# Patient Record
Sex: FEMALE | Race: WHITE | NOT HISPANIC OR LATINO | Employment: OTHER | URBAN - METROPOLITAN AREA
[De-identification: names, ages, dates, MRNs, and addresses within clinical notes are randomized per-mention and may not be internally consistent; named-entity substitution may affect disease eponyms.]

---

## 2017-01-04 ENCOUNTER — COMMUNICATION - HEALTHEAST (OUTPATIENT)
Dept: INTERNAL MEDICINE | Facility: CLINIC | Age: 62
End: 2017-01-04

## 2017-02-01 ENCOUNTER — OFFICE VISIT - HEALTHEAST (OUTPATIENT)
Dept: INTERNAL MEDICINE | Facility: CLINIC | Age: 62
End: 2017-02-01

## 2017-02-01 DIAGNOSIS — E66.9 OBESITY: ICD-10-CM

## 2017-02-01 DIAGNOSIS — R10.11 RIGHT UPPER QUADRANT ABDOMINAL PAIN: ICD-10-CM

## 2017-02-01 ASSESSMENT — MIFFLIN-ST. JEOR: SCORE: 1979.13

## 2017-02-03 ENCOUNTER — HOSPITAL ENCOUNTER (OUTPATIENT)
Dept: ULTRASOUND IMAGING | Facility: HOSPITAL | Age: 62
Discharge: HOME | End: 2017-02-03
Attending: INTERNAL MEDICINE

## 2017-02-03 DIAGNOSIS — R10.11 RIGHT UPPER QUADRANT ABDOMINAL PAIN: ICD-10-CM

## 2017-02-09 ENCOUNTER — COMMUNICATION - HEALTHEAST (OUTPATIENT)
Dept: INTERNAL MEDICINE | Facility: CLINIC | Age: 62
End: 2017-02-09

## 2017-02-12 ENCOUNTER — RECORDS - HEALTHEAST (OUTPATIENT)
Dept: ADMINISTRATIVE | Facility: OTHER | Age: 62
End: 2017-02-12

## 2017-04-10 ENCOUNTER — RECORDS - HEALTHEAST (OUTPATIENT)
Dept: ADMINISTRATIVE | Facility: OTHER | Age: 62
End: 2017-04-10

## 2017-05-01 ENCOUNTER — RECORDS - HEALTHEAST (OUTPATIENT)
Dept: ADMINISTRATIVE | Facility: OTHER | Age: 62
End: 2017-05-01

## 2017-05-17 ENCOUNTER — OFFICE VISIT - HEALTHEAST (OUTPATIENT)
Dept: INTERNAL MEDICINE | Facility: CLINIC | Age: 62
End: 2017-05-17

## 2017-05-17 DIAGNOSIS — E66.9 OBESITY: ICD-10-CM

## 2017-05-17 DIAGNOSIS — K76.0 NONALCOHOLIC FATTY LIVER DISEASE: ICD-10-CM

## 2017-05-17 LAB
CHOLEST SERPL-MCNC: 165 MG/DL
FASTING STATUS PATIENT QL REPORTED: YES
HDLC SERPL-MCNC: 53 MG/DL
LDLC SERPL CALC-MCNC: 86 MG/DL
TRIGL SERPL-MCNC: 128 MG/DL

## 2017-05-17 ASSESSMENT — MIFFLIN-ST. JEOR: SCORE: 1974.6

## 2017-05-18 ENCOUNTER — COMMUNICATION - HEALTHEAST (OUTPATIENT)
Dept: INTERNAL MEDICINE | Facility: CLINIC | Age: 62
End: 2017-05-18

## 2017-05-21 ENCOUNTER — RECORDS - HEALTHEAST (OUTPATIENT)
Dept: ADMINISTRATIVE | Facility: OTHER | Age: 62
End: 2017-05-21

## 2017-06-16 ENCOUNTER — RECORDS - HEALTHEAST (OUTPATIENT)
Dept: ADMINISTRATIVE | Facility: OTHER | Age: 62
End: 2017-06-16

## 2017-06-26 ENCOUNTER — RECORDS - HEALTHEAST (OUTPATIENT)
Dept: ADMINISTRATIVE | Facility: OTHER | Age: 62
End: 2017-06-26

## 2017-07-12 ENCOUNTER — RECORDS - HEALTHEAST (OUTPATIENT)
Dept: ADMINISTRATIVE | Facility: OTHER | Age: 62
End: 2017-07-12

## 2017-07-12 ENCOUNTER — COMMUNICATION - HEALTHEAST (OUTPATIENT)
Dept: SCHEDULING | Facility: CLINIC | Age: 62
End: 2017-07-12

## 2017-07-26 ENCOUNTER — OFFICE VISIT - HEALTHEAST (OUTPATIENT)
Dept: INTERNAL MEDICINE | Facility: CLINIC | Age: 62
End: 2017-07-26

## 2017-07-26 DIAGNOSIS — K76.0 NONALCOHOLIC FATTY LIVER DISEASE: ICD-10-CM

## 2017-07-26 DIAGNOSIS — E66.9 OBESITY: ICD-10-CM

## 2017-07-26 ASSESSMENT — MIFFLIN-ST. JEOR: SCORE: 1988.2

## 2017-10-06 ENCOUNTER — COMMUNICATION - HEALTHEAST (OUTPATIENT)
Dept: SCHEDULING | Facility: CLINIC | Age: 62
End: 2017-10-06

## 2017-10-09 ENCOUNTER — OFFICE VISIT - HEALTHEAST (OUTPATIENT)
Dept: INTERNAL MEDICINE | Facility: CLINIC | Age: 62
End: 2017-10-09

## 2017-10-09 DIAGNOSIS — K76.0 NONALCOHOLIC FATTY LIVER DISEASE: ICD-10-CM

## 2017-10-09 DIAGNOSIS — E11.9 TYPE 2 DIABETES MELLITUS (H): ICD-10-CM

## 2017-10-09 DIAGNOSIS — Z23 NEED FOR VACCINATION: ICD-10-CM

## 2017-10-09 DIAGNOSIS — R20.2 PARESTHESIAS: ICD-10-CM

## 2017-10-09 LAB — HBA1C MFR BLD: 5.9 % (ref 3.5–6)

## 2017-10-09 ASSESSMENT — MIFFLIN-ST. JEOR: SCORE: 1983.67

## 2017-10-11 ENCOUNTER — COMMUNICATION - HEALTHEAST (OUTPATIENT)
Dept: INTERNAL MEDICINE | Facility: CLINIC | Age: 62
End: 2017-10-11

## 2017-11-27 ENCOUNTER — RECORDS - HEALTHEAST (OUTPATIENT)
Dept: ADMINISTRATIVE | Facility: OTHER | Age: 62
End: 2017-11-27

## 2017-11-29 ENCOUNTER — RECORDS - HEALTHEAST (OUTPATIENT)
Dept: ADMINISTRATIVE | Facility: OTHER | Age: 62
End: 2017-11-29

## 2017-12-05 ENCOUNTER — OFFICE VISIT - HEALTHEAST (OUTPATIENT)
Dept: INTERNAL MEDICINE | Facility: CLINIC | Age: 62
End: 2017-12-05

## 2017-12-05 DIAGNOSIS — E11.9 TYPE 2 DIABETES MELLITUS (H): ICD-10-CM

## 2017-12-05 DIAGNOSIS — M54.16 LUMBAR RADICULOPATHY: ICD-10-CM

## 2017-12-05 DIAGNOSIS — I80.9 SUPERFICIAL PHLEBITIS: ICD-10-CM

## 2017-12-05 ASSESSMENT — MIFFLIN-ST. JEOR: SCORE: 1970.06

## 2017-12-09 ENCOUNTER — RECORDS - HEALTHEAST (OUTPATIENT)
Dept: ADMINISTRATIVE | Facility: OTHER | Age: 62
End: 2017-12-09

## 2017-12-11 ENCOUNTER — COMMUNICATION - HEALTHEAST (OUTPATIENT)
Dept: SCHEDULING | Facility: CLINIC | Age: 62
End: 2017-12-11

## 2017-12-13 ENCOUNTER — OFFICE VISIT - HEALTHEAST (OUTPATIENT)
Dept: INTERNAL MEDICINE | Facility: CLINIC | Age: 62
End: 2017-12-13

## 2017-12-13 ENCOUNTER — HOSPITAL ENCOUNTER (OUTPATIENT)
Dept: ULTRASOUND IMAGING | Facility: HOSPITAL | Age: 62
Discharge: HOME | End: 2017-12-13
Attending: INTERNAL MEDICINE

## 2017-12-13 DIAGNOSIS — I82.409 DVT (DEEP VENOUS THROMBOSIS) (H): ICD-10-CM

## 2017-12-13 DIAGNOSIS — R20.0 NUMBNESS IN RIGHT LEG: ICD-10-CM

## 2017-12-13 DIAGNOSIS — M79.604 PAIN OF RIGHT LOWER EXTREMITY: ICD-10-CM

## 2017-12-13 ASSESSMENT — MIFFLIN-ST. JEOR: SCORE: 1965.52

## 2018-02-01 ENCOUNTER — RECORDS - HEALTHEAST (OUTPATIENT)
Dept: ADMINISTRATIVE | Facility: OTHER | Age: 63
End: 2018-02-01

## 2018-02-03 ENCOUNTER — RECORDS - HEALTHEAST (OUTPATIENT)
Dept: LAB | Facility: HOSPITAL | Age: 63
End: 2018-02-03

## 2018-02-03 LAB
ALBUMIN SERPL-MCNC: 3.6 G/DL (ref 3.5–5)
ALP SERPL-CCNC: 112 U/L (ref 45–120)
ALT SERPL W P-5'-P-CCNC: 25 U/L (ref 0–45)
ANION GAP SERPL CALCULATED.3IONS-SCNC: 12 MMOL/L (ref 5–18)
AST SERPL W P-5'-P-CCNC: 22 U/L (ref 0–40)
BILIRUB SERPL-MCNC: 0.6 MG/DL (ref 0–1)
BUN SERPL-MCNC: 12 MG/DL (ref 8–22)
CALCIUM SERPL-MCNC: 9.4 MG/DL (ref 8.5–10.5)
CHLORIDE BLD-SCNC: 105 MMOL/L (ref 98–107)
CO2 SERPL-SCNC: 25 MMOL/L (ref 22–31)
CREAT SERPL-MCNC: 0.78 MG/DL (ref 0.6–1.1)
GFR SERPL CREATININE-BSD FRML MDRD: >60 ML/MIN/1.73M2
GLUCOSE BLD-MCNC: 120 MG/DL (ref 70–125)
POTASSIUM BLD-SCNC: 4.2 MMOL/L (ref 3.5–5)
PROT SERPL-MCNC: 7.8 G/DL (ref 6–8)
SODIUM SERPL-SCNC: 142 MMOL/L (ref 136–145)
TSH SERPL DL<=0.005 MIU/L-ACNC: 2.79 UIU/ML (ref 0.3–5)
VIT B12 SERPL-MCNC: 592 PG/ML (ref 213–816)

## 2018-02-08 ENCOUNTER — HOSPITAL ENCOUNTER (OUTPATIENT)
Dept: MRI IMAGING | Facility: HOSPITAL | Age: 63
Discharge: HOME | End: 2018-02-08
Attending: PSYCHIATRY & NEUROLOGY

## 2018-02-08 ENCOUNTER — RECORDS - HEALTHEAST (OUTPATIENT)
Dept: ADMINISTRATIVE | Facility: OTHER | Age: 63
End: 2018-02-08

## 2018-02-08 DIAGNOSIS — R47.89 WORD FINDING DIFFICULTY: ICD-10-CM

## 2018-02-08 DIAGNOSIS — R20.0 NUMBNESS OF LEGS: ICD-10-CM

## 2018-02-26 ENCOUNTER — RECORDS - HEALTHEAST (OUTPATIENT)
Dept: ADMINISTRATIVE | Facility: OTHER | Age: 63
End: 2018-02-26

## 2018-05-07 ENCOUNTER — OFFICE VISIT - HEALTHEAST (OUTPATIENT)
Dept: INTERNAL MEDICINE | Facility: CLINIC | Age: 63
End: 2018-05-07

## 2018-05-07 DIAGNOSIS — E11.9 TYPE 2 DIABETES MELLITUS (H): ICD-10-CM

## 2018-05-07 DIAGNOSIS — K57.92 DIVERTICULITIS: ICD-10-CM

## 2018-05-07 LAB — HBA1C MFR BLD: 6.1 % (ref 3.5–6)

## 2018-05-07 ASSESSMENT — MIFFLIN-ST. JEOR: SCORE: 1965.52

## 2018-05-08 ENCOUNTER — COMMUNICATION - HEALTHEAST (OUTPATIENT)
Dept: INTERNAL MEDICINE | Facility: CLINIC | Age: 63
End: 2018-05-08

## 2018-06-11 ENCOUNTER — RECORDS - HEALTHEAST (OUTPATIENT)
Dept: ADMINISTRATIVE | Facility: OTHER | Age: 63
End: 2018-06-11

## 2018-06-18 ENCOUNTER — HOSPITAL ENCOUNTER (OUTPATIENT)
Dept: MAMMOGRAPHY | Facility: CLINIC | Age: 63
Discharge: HOME | End: 2018-06-18
Attending: OBSTETRICS & GYNECOLOGY

## 2018-06-18 DIAGNOSIS — N64.9 BREAST LESION: ICD-10-CM

## 2018-06-18 DIAGNOSIS — N64.89 OTHER SPECIFIED DISORDERS OF BREAST (CODE): ICD-10-CM

## 2018-08-28 ENCOUNTER — OFFICE VISIT - HEALTHEAST (OUTPATIENT)
Dept: INTERNAL MEDICINE | Facility: CLINIC | Age: 63
End: 2018-08-28

## 2018-08-28 DIAGNOSIS — R10.32 LLQ ABDOMINAL PAIN: ICD-10-CM

## 2018-08-28 DIAGNOSIS — E11.9 TYPE 2 DIABETES MELLITUS (H): ICD-10-CM

## 2018-08-28 ASSESSMENT — MIFFLIN-ST. JEOR: SCORE: 1970.06

## 2018-08-30 ENCOUNTER — RECORDS - HEALTHEAST (OUTPATIENT)
Dept: ADMINISTRATIVE | Facility: OTHER | Age: 63
End: 2018-08-30

## 2018-09-17 ENCOUNTER — COMMUNICATION - HEALTHEAST (OUTPATIENT)
Dept: INTERNAL MEDICINE | Facility: CLINIC | Age: 63
End: 2018-09-17

## 2018-11-08 ENCOUNTER — RECORDS - HEALTHEAST (OUTPATIENT)
Dept: ADMINISTRATIVE | Facility: OTHER | Age: 63
End: 2018-11-08

## 2019-03-05 ENCOUNTER — RECORDS - HEALTHEAST (OUTPATIENT)
Dept: ADMINISTRATIVE | Facility: OTHER | Age: 64
End: 2019-03-05

## 2019-03-12 ENCOUNTER — OFFICE VISIT - HEALTHEAST (OUTPATIENT)
Dept: INTERNAL MEDICINE | Facility: CLINIC | Age: 64
End: 2019-03-12

## 2019-03-12 DIAGNOSIS — E66.01 MORBID OBESITY (H): ICD-10-CM

## 2019-03-12 DIAGNOSIS — F32.5 MAJOR DEPRESSION IN COMPLETE REMISSION (H): ICD-10-CM

## 2019-03-12 DIAGNOSIS — M79.662 PAIN OF LEFT LOWER LEG: ICD-10-CM

## 2019-03-12 DIAGNOSIS — Z23 NEED FOR VACCINATION: ICD-10-CM

## 2019-03-12 DIAGNOSIS — E11.9 TYPE 2 DIABETES MELLITUS WITHOUT COMPLICATION, WITHOUT LONG-TERM CURRENT USE OF INSULIN (H): ICD-10-CM

## 2019-03-12 DIAGNOSIS — I82.4Z9 DEEP VEIN THROMBOSIS (DVT) OF DISTAL VEIN OF LOWER EXTREMITY, UNSPECIFIED CHRONICITY, UNSPECIFIED LATERALITY (H): ICD-10-CM

## 2019-03-12 DIAGNOSIS — I48.0 PAROXYSMAL ATRIAL FIBRILLATION (H): ICD-10-CM

## 2019-03-12 LAB
C REACTIVE PROTEIN LHE: 2.7 MG/DL (ref 0–0.8)
HBA1C MFR BLD: 5.8 % (ref 3.5–6)

## 2019-03-12 ASSESSMENT — MIFFLIN-ST. JEOR: SCORE: 1970.06

## 2019-03-14 ENCOUNTER — COMMUNICATION - HEALTHEAST (OUTPATIENT)
Dept: INTERNAL MEDICINE | Facility: CLINIC | Age: 64
End: 2019-03-14

## 2019-03-15 ENCOUNTER — COMMUNICATION - HEALTHEAST (OUTPATIENT)
Dept: SCHEDULING | Facility: CLINIC | Age: 64
End: 2019-03-15

## 2019-03-19 ENCOUNTER — OFFICE VISIT - HEALTHEAST (OUTPATIENT)
Dept: INTERNAL MEDICINE | Facility: CLINIC | Age: 64
End: 2019-03-19

## 2019-03-19 DIAGNOSIS — G62.9 PERIPHERAL POLYNEUROPATHY: ICD-10-CM

## 2019-03-19 ASSESSMENT — MIFFLIN-ST. JEOR: SCORE: 1970.06

## 2019-05-13 ENCOUNTER — OFFICE VISIT - HEALTHEAST (OUTPATIENT)
Dept: INTERNAL MEDICINE | Facility: CLINIC | Age: 64
End: 2019-05-13

## 2019-05-13 DIAGNOSIS — E66.01 MORBID OBESITY (H): ICD-10-CM

## 2019-05-13 DIAGNOSIS — F32.5 MAJOR DEPRESSION IN COMPLETE REMISSION (H): ICD-10-CM

## 2019-05-13 DIAGNOSIS — K21.00 GERD WITH ESOPHAGITIS: ICD-10-CM

## 2019-05-13 DIAGNOSIS — Z12.11 SCREENING FOR MALIGNANT NEOPLASM OF COLON: ICD-10-CM

## 2019-05-13 ASSESSMENT — MIFFLIN-ST. JEOR: SCORE: 1979.13

## 2019-05-21 ENCOUNTER — COMMUNICATION - HEALTHEAST (OUTPATIENT)
Dept: SCHEDULING | Facility: CLINIC | Age: 64
End: 2019-05-21

## 2019-07-12 ENCOUNTER — RECORDS - HEALTHEAST (OUTPATIENT)
Dept: ADMINISTRATIVE | Facility: OTHER | Age: 64
End: 2019-07-12

## 2019-08-05 ENCOUNTER — RECORDS - HEALTHEAST (OUTPATIENT)
Dept: ADMINISTRATIVE | Facility: OTHER | Age: 64
End: 2019-08-05

## 2019-08-14 ENCOUNTER — RECORDS - HEALTHEAST (OUTPATIENT)
Dept: HEALTH INFORMATION MANAGEMENT | Facility: CLINIC | Age: 64
End: 2019-08-14

## 2019-09-03 ENCOUNTER — OFFICE VISIT - HEALTHEAST (OUTPATIENT)
Dept: INTERNAL MEDICINE | Facility: CLINIC | Age: 64
End: 2019-09-03

## 2019-09-03 DIAGNOSIS — K11.20 SALIVARY GLAND INFECTION: ICD-10-CM

## 2019-09-03 LAB
BASOPHILS # BLD AUTO: 0 THOU/UL (ref 0–0.2)
BASOPHILS NFR BLD AUTO: 0 % (ref 0–2)
C REACTIVE PROTEIN LHE: 2.6 MG/DL (ref 0–0.8)
EOSINOPHIL # BLD AUTO: 0.1 THOU/UL (ref 0–0.4)
EOSINOPHIL NFR BLD AUTO: 1 % (ref 0–6)
ERYTHROCYTE [DISTWIDTH] IN BLOOD BY AUTOMATED COUNT: 15.8 % (ref 11–14.5)
HCT VFR BLD AUTO: 41.2 % (ref 35–47)
HGB BLD-MCNC: 12.7 G/DL (ref 12–16)
LYMPHOCYTES # BLD AUTO: 2.7 THOU/UL (ref 0.8–4.4)
LYMPHOCYTES NFR BLD AUTO: 27 % (ref 20–40)
MCH RBC QN AUTO: 24.4 PG (ref 27–34)
MCHC RBC AUTO-ENTMCNC: 30.8 G/DL (ref 32–36)
MCV RBC AUTO: 79 FL (ref 80–100)
MONOCYTES # BLD AUTO: 0.9 THOU/UL (ref 0–0.9)
MONOCYTES NFR BLD AUTO: 9 % (ref 2–10)
NEUTROPHILS # BLD AUTO: 6.3 THOU/UL (ref 2–7.7)
NEUTROPHILS NFR BLD AUTO: 63 % (ref 50–70)
PLATELET # BLD AUTO: 256 THOU/UL (ref 140–440)
PMV BLD AUTO: 11.1 FL (ref 8.5–12.5)
RBC # BLD AUTO: 5.21 MILL/UL (ref 3.8–5.4)
WBC: 10 THOU/UL (ref 4–11)

## 2019-09-03 ASSESSMENT — PATIENT HEALTH QUESTIONNAIRE - PHQ9: SUM OF ALL RESPONSES TO PHQ QUESTIONS 1-9: 10

## 2019-09-03 ASSESSMENT — MIFFLIN-ST. JEOR: SCORE: 1979.13

## 2019-09-04 ENCOUNTER — COMMUNICATION - HEALTHEAST (OUTPATIENT)
Dept: INTERNAL MEDICINE | Facility: CLINIC | Age: 64
End: 2019-09-04

## 2019-09-04 ENCOUNTER — COMMUNICATION - HEALTHEAST (OUTPATIENT)
Dept: SCHEDULING | Facility: CLINIC | Age: 64
End: 2019-09-04

## 2019-09-11 ENCOUNTER — COMMUNICATION - HEALTHEAST (OUTPATIENT)
Dept: INTERNAL MEDICINE | Facility: CLINIC | Age: 64
End: 2019-09-11

## 2019-09-17 ENCOUNTER — COMMUNICATION - HEALTHEAST (OUTPATIENT)
Dept: SCHEDULING | Facility: CLINIC | Age: 64
End: 2019-09-17

## 2019-09-19 ENCOUNTER — RECORDS - HEALTHEAST (OUTPATIENT)
Dept: ADMINISTRATIVE | Facility: OTHER | Age: 64
End: 2019-09-19

## 2019-09-23 ENCOUNTER — RECORDS - HEALTHEAST (OUTPATIENT)
Dept: ADMINISTRATIVE | Facility: OTHER | Age: 64
End: 2019-09-23

## 2019-10-21 ENCOUNTER — RECORDS - HEALTHEAST (OUTPATIENT)
Dept: ADMINISTRATIVE | Facility: OTHER | Age: 64
End: 2019-10-21

## 2019-11-20 ENCOUNTER — COMMUNICATION - HEALTHEAST (OUTPATIENT)
Dept: INTERNAL MEDICINE | Facility: CLINIC | Age: 64
End: 2019-11-20

## 2019-11-20 DIAGNOSIS — K21.00 GERD WITH ESOPHAGITIS: ICD-10-CM

## 2019-12-02 ENCOUNTER — RECORDS - HEALTHEAST (OUTPATIENT)
Dept: ADMINISTRATIVE | Facility: OTHER | Age: 64
End: 2019-12-02

## 2019-12-02 ENCOUNTER — AMBULATORY - HEALTHEAST (OUTPATIENT)
Dept: NEUROLOGY | Facility: CLINIC | Age: 64
End: 2019-12-02

## 2019-12-02 DIAGNOSIS — R47.89 WORD FINDING DIFFICULTY: ICD-10-CM

## 2019-12-19 ENCOUNTER — HOSPITAL ENCOUNTER (OUTPATIENT)
Dept: NEUROLOGY | Facility: CLINIC | Age: 64
Setting detail: THERAPIES SERIES
Discharge: STILL ADMITTED OR ADMINISTRATIVE | End: 2019-12-19
Attending: PSYCHIATRY & NEUROLOGY

## 2019-12-19 DIAGNOSIS — R47.89 WORD FINDING DIFFICULTY: ICD-10-CM

## 2020-01-03 ENCOUNTER — HOSPITAL ENCOUNTER (OUTPATIENT)
Dept: NEUROLOGY | Facility: CLINIC | Age: 65
Setting detail: THERAPIES SERIES
Discharge: STILL ADMITTED OR ADMINISTRATIVE | End: 2020-01-03
Attending: PSYCHIATRY & NEUROLOGY

## 2020-01-03 DIAGNOSIS — F31.9 BIPOLAR I DISORDER (H): ICD-10-CM

## 2020-01-03 DIAGNOSIS — F06.70 MILD NEUROCOGNITIVE DISORDER DUE TO MULTIPLE ETIOLOGIES: ICD-10-CM

## 2020-01-03 DIAGNOSIS — F32.5 MAJOR DEPRESSION IN COMPLETE REMISSION (H): ICD-10-CM

## 2020-01-03 DIAGNOSIS — E66.01 MORBID OBESITY (H): ICD-10-CM

## 2020-05-12 ENCOUNTER — AMBULATORY - HEALTHEAST (OUTPATIENT)
Dept: LAB | Facility: CLINIC | Age: 65
End: 2020-05-12

## 2020-05-12 ENCOUNTER — OFFICE VISIT - HEALTHEAST (OUTPATIENT)
Dept: INTERNAL MEDICINE | Facility: CLINIC | Age: 65
End: 2020-05-12

## 2020-05-12 DIAGNOSIS — R73.03 PREDIABETES: ICD-10-CM

## 2020-05-12 DIAGNOSIS — F32.5 MAJOR DEPRESSION IN COMPLETE REMISSION (H): ICD-10-CM

## 2020-05-12 DIAGNOSIS — E66.01 MORBID OBESITY (H): ICD-10-CM

## 2020-05-12 LAB
ANION GAP SERPL CALCULATED.3IONS-SCNC: 11 MMOL/L (ref 5–18)
BUN SERPL-MCNC: 10 MG/DL (ref 8–22)
CALCIUM SERPL-MCNC: 9.5 MG/DL (ref 8.5–10.5)
CHLORIDE BLD-SCNC: 100 MMOL/L (ref 98–107)
CO2 SERPL-SCNC: 28 MMOL/L (ref 22–31)
CREAT SERPL-MCNC: 0.76 MG/DL (ref 0.6–1.1)
GFR SERPL CREATININE-BSD FRML MDRD: >60 ML/MIN/1.73M2
GLUCOSE BLD-MCNC: 91 MG/DL (ref 70–125)
POTASSIUM BLD-SCNC: 4.6 MMOL/L (ref 3.5–5)
SODIUM SERPL-SCNC: 139 MMOL/L (ref 136–145)

## 2020-05-12 ASSESSMENT — PATIENT HEALTH QUESTIONNAIRE - PHQ9: SUM OF ALL RESPONSES TO PHQ QUESTIONS 1-9: 14

## 2020-05-12 ASSESSMENT — MIFFLIN-ST. JEOR: SCORE: 2010.88

## 2020-05-13 ENCOUNTER — COMMUNICATION - HEALTHEAST (OUTPATIENT)
Dept: INTERNAL MEDICINE | Facility: CLINIC | Age: 65
End: 2020-05-13

## 2020-05-13 LAB — HBA1C MFR BLD: 5.9 %

## 2020-06-10 ENCOUNTER — RECORDS - HEALTHEAST (OUTPATIENT)
Dept: ADMINISTRATIVE | Facility: OTHER | Age: 65
End: 2020-06-10

## 2020-06-17 ENCOUNTER — RECORDS - HEALTHEAST (OUTPATIENT)
Dept: ADMINISTRATIVE | Facility: OTHER | Age: 65
End: 2020-06-17

## 2020-06-17 LAB — RETINOPATHY: NEGATIVE

## 2020-06-23 ENCOUNTER — RECORDS - HEALTHEAST (OUTPATIENT)
Dept: HEALTH INFORMATION MANAGEMENT | Facility: CLINIC | Age: 65
End: 2020-06-23

## 2020-07-03 PROBLEM — R20.0 NUMBNESS OF LOWER EXTREMITY: Status: ACTIVE | Noted: 2020-07-03

## 2020-07-03 PROBLEM — R00.2 PALPITATIONS: Status: ACTIVE | Noted: 2017-06-07

## 2020-07-03 PROBLEM — R73.03 PREDIABETES: Status: ACTIVE | Noted: 2020-05-12

## 2020-07-03 PROBLEM — R47.89 WORD FINDING DIFFICULTY: Status: ACTIVE | Noted: 2020-07-03

## 2020-07-03 PROBLEM — I82.4Z9 DEEP VEIN THROMBOSIS (DVT) OF DISTAL VEIN OF LOWER EXTREMITY, UNSPECIFIED CHRONICITY, UNSPECIFIED LATERALITY (H): Status: ACTIVE | Noted: 2019-03-12

## 2020-07-03 PROBLEM — F32.5 MAJOR DEPRESSION IN COMPLETE REMISSION (H): Status: ACTIVE | Noted: 2018-08-28

## 2020-07-03 PROBLEM — E66.01 MORBID OBESITY (H): Status: ACTIVE | Noted: 2018-08-28

## 2020-07-07 ENCOUNTER — OFFICE VISIT (OUTPATIENT)
Dept: NEUROLOGY | Facility: CLINIC | Age: 65
End: 2020-07-07
Payer: COMMERCIAL

## 2020-07-07 ENCOUNTER — RECORDS - HEALTHEAST (OUTPATIENT)
Dept: ADMINISTRATIVE | Facility: OTHER | Age: 65
End: 2020-07-07

## 2020-07-07 VITALS — BODY MASS INDEX: 45.99 KG/M2 | HEIGHT: 67 IN | WEIGHT: 293 LBS

## 2020-07-07 DIAGNOSIS — R41.3 FUNCTIONAL MEMORY PROBLEM: ICD-10-CM

## 2020-07-07 DIAGNOSIS — R47.89 WORD FINDING DIFFICULTY: Primary | ICD-10-CM

## 2020-07-07 PROCEDURE — 99214 OFFICE O/P EST MOD 30 MIN: CPT | Mod: GT | Performed by: PSYCHIATRY & NEUROLOGY

## 2020-07-07 RX ORDER — ARIPIPRAZOLE 5 MG/1
7.5 TABLET ORAL DAILY
COMMUNITY

## 2020-07-07 RX ORDER — ATENOLOL 25 MG/1
25 TABLET ORAL DAILY
COMMUNITY
Start: 2020-06-19

## 2020-07-07 RX ORDER — ESCITALOPRAM OXALATE 20 MG/1
20 TABLET ORAL DAILY
COMMUNITY

## 2020-07-07 RX ORDER — CLONAZEPAM 0.5 MG/1
0.5 TABLET ORAL 2 TIMES DAILY PRN
COMMUNITY
Start: 2019-04-03

## 2020-07-07 RX ORDER — ASPIRIN 325 MG
325 TABLET ORAL DAILY
COMMUNITY
End: 2020-12-30 | Stop reason: DRUGHIGH

## 2020-07-07 RX ORDER — ESOMEPRAZOLE MAGNESIUM 40 MG/1
40 CAPSULE, DELAYED RELEASE ORAL DAILY
COMMUNITY
Start: 2019-05-13 | End: 2021-05-11

## 2020-07-07 ASSESSMENT — MIFFLIN-ST. JEOR: SCORE: 2011.46

## 2020-07-07 NOTE — LETTER
"    7/7/2020         RE: Concepción De  [address]  [place] [address]        Dear Colleague,    Thank you for referring your patient, Concepción De, to the [facility]OLOG[facility] [place]. Please see a copy of my visit note below.    [facility] Neurology  [place]    Concepción De MRN# [id number]   Age: 65 year old YOB: 1955               Assessment and Plan:   Assessment:   Cognitive inefficiencies, likely multifactorial        Plan:     I am hopeful there is not a specific neurodegenerative issue going on here.  We will check MRI of the brain, and some further labs.  I discussed with Concepción that if these tests are unremarkable then her cognitive complaints are likely due to other issues such as emotional issues or medications.  She was somewhat receptive to that.  In addition, as per the neuropsych report, we will refer her for a session with speech therapy to help with cognitive inefficiencies.  She should come back and see us if symptoms are clearly worsening in the future.             Chief Complaint/HPI:     I saw Concepción for a video follow-up visit today.  She is \"doing okay I guess.\"  She complains that her words do not come as easy as they used to.  It seems like it is getting more often.  This is similar to our discussion when I saw her in December 2019.  Later in December she did have formal neuropsych testing done.  This demonstrated variable, but mostly low average scores.  There were some deficits in verbal delayed memory.  While these findings were not compelling for a neurodegenerative process such as Alzheimer's disease, this cannot be ruled out entirely.  Reevaluation with neuropsych testing in 18 to 24 months is recommended.  The current study would stand as baseline.  Also, since her complaints really center more around speech and word finding issues it was suggested that she see the speech therapists for \"compensation focused cognitive " "rehabilitation.\".            Past Medical History:    has a past medical history of Depressive disorder and Pre-diabetes.          Past Surgical History:    has a past surgical history that includes GYN surgery; ENT surgery; GI surgery; and appendectomy.          Social History:     Social History     Tobacco Use     Smoking status: Never Smoker     Smokeless tobacco: Never Used   Substance Use Topics     Alcohol use: Not Currently             Family History:     Family History   Problem Relation Age of Onset     Cancer Mother      Cancer Father      Cirrhosis Father                 Allergies:     Allergies   Allergen Reactions     Clozapine Anaphylaxis     Ciprofloxacin Hives and Itching     Nitrofurantoin Other (See Comments) and Unknown     Flu per Patient  Flu per Patient  Flu per Patient       Sulfasalazine      Other reaction(s): *Unknown             Medications:     Current Outpatient Medications:      ARIPiprazole (ABILIFY) 5 MG tablet, Take 7.5 mg by mouth daily, Disp: , Rfl:      aspirin (ASA) 325 MG tablet, Take 325 mg by mouth daily, Disp: , Rfl:      atenolol (TENORMIN) 25 MG tablet, Take 25 mg by mouth daily, Disp: , Rfl:      clonazePAM (KLONOPIN) 0.5 MG tablet, Take 0.5 mg by mouth 2 times daily as needed, Disp: , Rfl:      escitalopram (LEXAPRO) 20 MG tablet, Take 20 mg by mouth daily, Disp: , Rfl:      esomeprazole (NEXIUM) 40 MG DR capsule, Take 40 mg by mouth daily, Disp: , Rfl:      metFORMIN (GLUCOPHAGE) 500 MG tablet, Take 500 mg by mouth daily, Disp: , Rfl:      Multiple Vitamins-Minerals (PRESERVISION AREDS 2 PO), Take 1 tablet by mouth 2 times daily, Disp: , Rfl:      polyethylene glycol-propylene glycol (SYSTANE ULTRA) 0.4-0.3 % SOLN ophthalmic solution, Place 1 drop into both eyes every hour as needed for dry eyes, Disp: , Rfl:            Review of Systems:   No difficulty breathing or swallowing, no double vision             Physical Exam:   Awake and alert with no aphasia no " "dysarthria  Speech is clear and coherent  Cranial nerves are fine  I do not see any focal or lateralized weakness or coordination difficulties in the arms  Gait looks steady here on the video.  I did not retest her cognition today, though her speech for the most part is quite fluent and she does provide a clear and consistent history.    The patient has been notified of following:     \"This video visit will be conducted via a call between you and your physician/provider. We have found that certain health care needs can be provided without the need for an in-person physical exam.  This service lets us provide the care you need with a video conversation.  If a prescription is necessary we can send it directly to your pharmacy.  If lab work is needed we can place an order for that and you can then stop by our lab to have the test done at a later time.    Video visits are billed at different rates depending on your insurance coverage.  Please reach out to your insurance provider with any questions.    If during the course of the call the physician/provider feels a video visit is not appropriate, you will not be charged for this service.\"    Patient has given verbal consent for Video visit? Yes      Video-Visit Details    Type of service:  Video Visit    Video Start Time: 8:10 AM  Video End Time: 8:20 AM    Originating Location (pt. Location): Home  Distant Location (provider location):  [facility] NEUROLOGY [place]   Platform used for Video Visit: [facility]             Richardson Choudhury MD         Again, thank you for allowing me to participate in the care of your patient.        Sincerely,        Richardson Choudhury MD    "

## 2020-07-07 NOTE — PATIENT INSTRUCTIONS
Patient Education     Confusion  Confusion or delirium is a change in a person s ability to think clearly. There may be trouble recognizing familiar people and places or knowing what day it is. Memory, judgment, and decision-making may also be affected. In severe cases, the person may have limited or no response to being spoken to. Confusion usually appears over a few days and can vary throughout the day. It can last weeks to months.  Confusion is usually a sign of an underlying problem. It may occur suddenly. Or it may develop gradually over time. Causes of confusion include brain injury, medicines, alcohol, withdrawal from certain medicines or illegal drugs, and infection. Heart attack and stroke may cause it. Confusion can also be a sign of dementia or a mental illness.  Treatment will depend on the cause of the problem. If the issue is a medicine, stopping the medicine may help. Thiamine supplement may help with very little risk of side effects. Haloperidol is useful but people with Parkinson disease should not use it. Benzodiazepines are only used in people undergoing alcohol withdrawal.  Home care    Be sure someone is with the confused person at all times. He or she should not be left alone or unsupervised.    Tell the healthcare provider about all medicines that the person takes. These include prescription, over-the-counter, herbs, and supplements.    Dehydration can increase confusion. Ask the healthcare provider how much fluid the person should be drinking. Offer liquids and ensure that they are taken.    Keep all medicines in a secure place under the caregiver s control. To prevent overdose, a confused person should take medicines only under the supervision of a caregiver.    To help a person with confusion:  ? Establish a daily routine. Change can be a source of stress for someone with confusion. Make and keep a time schedule for common tasks such as bathing, dressing, taking medicines, meals, going  for walks, shopping, naps and bed time. Make sure that the person has glasses and hearing aids if needed.  ? Don't use physical restraints.  ? Speak slowly and clearly with a gentle tone of voice. Use short simple words and sentences. Ask one question at a time. Don'tt interrupt, criticize or argue. Be calm and supportive. Use friendly facial expressions. Use pointing and touching to help communicate. If there has been loss of long-term memory, don't ask questions about past events. This would only cause frustration for the person.  ? Use lists, signs, family photos, clocks and calendars as memory aids. Label cabinets and drawers. Try to distract, not confront, the person. When he or she becomes frustrated or upset, redirect attention to eating or some other activity of interest.  ? If this proves to be due to a permanent condition, talk to the healthcare provider or a  about getting a Power of  for healthcare and for financial decisions. It is best to do this while the person can still sign legal documents and make his or her own decisions. Otherwise, a court order will be required.  Follow-up care  Follow up with the person's healthcare provider or as advised for further testing or changes in medical care.  When to seek medical advice  Call the healthcare provider for any of the following:    Frequent falling    Refusal to eat or drink    Increased drowsiness    Nausea or vomiting    Unexplained fever over 100.4  F (38.0  C) or as directed by the healthcare provider  Call 911  Call 911 or emergency services right away if any of the following occur:    Violent behavior or behavior too hard to manage at home    New hallucinations or delusions    complains of severe headache or numbness or weakness of the face, arm, or leg    Slurred speech or trouble speaking, walking, or seeing    Fainting spell, dizziness, or seizure  Date Last Reviewed: 3/1/2018    0454-1905 [employer]. [address] [place] [address]  [place] [address]. All rights reserved. This information is not intended as a substitute for professional medical care. Always follow your healthcare professional's instructions.

## 2020-07-07 NOTE — NURSING NOTE
Chief Complaint   Patient presents with     Consult     Patient states that she has been having difficulty recalling memory and have worsened since last visit did complete neuropsych testing 12/19/19-report placed on Dr. HERBERT's desk      Video visit on smart phone [phone]  Trevin Kwok on 7/7/2020 at 7:41 AM

## 2020-07-07 NOTE — PROGRESS NOTES
"[facility] Neurology  [place]    Concepción De MRN# [id number]   Age: 65 year old YOB: 1955               Assessment and Plan:   Assessment:   Cognitive inefficiencies, likely multifactorial        Plan:     I am hopeful there is not a specific neurodegenerative issue going on here.  We will check MRI of the brain, and some further labs.  I discussed with Concepción that if these tests are unremarkable then her cognitive complaints are likely due to other issues such as emotional issues or medications.  She was somewhat receptive to that.  In addition, as per the neuropsych report, we will refer her for a session with speech therapy to help with cognitive inefficiencies.  She should come back and see us if symptoms are clearly worsening in the future.             Chief Complaint/HPI:     I saw Concepción for a video follow-up visit today.  She is \"doing okay I guess.\"  She complains that her words do not come as easy as they used to.  It seems like it is getting more often.  This is similar to our discussion when I saw her in December 2019.  Later in December she did have formal neuropsych testing done.  This demonstrated variable, but mostly low average scores.  There were some deficits in verbal delayed memory.  While these findings were not compelling for a neurodegenerative process such as Alzheimer's disease, this cannot be ruled out entirely.  Reevaluation with neuropsych testing in 18 to 24 months is recommended.  The current study would stand as baseline.  Also, since her complaints really center more around speech and word finding issues it was suggested that she see the speech therapists for \"compensation focused cognitive rehabilitation.\".            Past Medical History:    has a past medical history of Depressive disorder and Pre-diabetes.          Past Surgical History:    has a past surgical history that includes GYN surgery; ENT surgery; GI surgery; and appendectomy.          " Social History:     Social History     Tobacco Use     Smoking status: Never Smoker     Smokeless tobacco: Never Used   Substance Use Topics     Alcohol use: Not Currently             Family History:     Family History   Problem Relation Age of Onset     Cancer Mother      Cancer Father      Cirrhosis Father                 Allergies:     Allergies   Allergen Reactions     Clozapine Anaphylaxis     Ciprofloxacin Hives and Itching     Nitrofurantoin Other (See Comments) and Unknown     Flu per Patient  Flu per Patient  Flu per Patient       Sulfasalazine      Other reaction(s): *Unknown             Medications:     Current Outpatient Medications:      ARIPiprazole (ABILIFY) 5 MG tablet, Take 7.5 mg by mouth daily, Disp: , Rfl:      aspirin (ASA) 325 MG tablet, Take 325 mg by mouth daily, Disp: , Rfl:      atenolol (TENORMIN) 25 MG tablet, Take 25 mg by mouth daily, Disp: , Rfl:      clonazePAM (KLONOPIN) 0.5 MG tablet, Take 0.5 mg by mouth 2 times daily as needed, Disp: , Rfl:      escitalopram (LEXAPRO) 20 MG tablet, Take 20 mg by mouth daily, Disp: , Rfl:      esomeprazole (NEXIUM) 40 MG DR capsule, Take 40 mg by mouth daily, Disp: , Rfl:      metFORMIN (GLUCOPHAGE) 500 MG tablet, Take 500 mg by mouth daily, Disp: , Rfl:      Multiple Vitamins-Minerals (PRESERVISION AREDS 2 PO), Take 1 tablet by mouth 2 times daily, Disp: , Rfl:      polyethylene glycol-propylene glycol (SYSTANE ULTRA) 0.4-0.3 % SOLN ophthalmic solution, Place 1 drop into both eyes every hour as needed for dry eyes, Disp: , Rfl:            Review of Systems:   No difficulty breathing or swallowing, no double vision             Physical Exam:   Awake and alert with no aphasia no dysarthria  Speech is clear and coherent  Cranial nerves are fine  I do not see any focal or lateralized weakness or coordination difficulties in the arms  Gait looks steady here on the video.  I did not retest her cognition today, though her speech for the most part is  "quite fluent and she does provide a clear and consistent history.    The patient has been notified of following:     \"This video visit will be conducted via a call between you and your physician/provider. We have found that certain health care needs can be provided without the need for an in-person physical exam.  This service lets us provide the care you need with a video conversation.  If a prescription is necessary we can send it directly to your pharmacy.  If lab work is needed we can place an order for that and you can then stop by our lab to have the test done at a later time.    Video visits are billed at different rates depending on your insurance coverage.  Please reach out to your insurance provider with any questions.    If during the course of the call the physician/provider feels a video visit is not appropriate, you will not be charged for this service.\"    Patient has given verbal consent for Video visit? Yes      Video-Visit Details    Type of service:  Video Visit    Video Start Time: 8:10 AM  Video End Time: 8:20 AM    Originating Location (pt. Location): Home  Distant Location (provider location):  [facility] NEUROLOGY [place]   Platform used for Video Visit: Lei Choudhury MD         "

## 2020-07-08 ENCOUNTER — RECORDS - HEALTHEAST (OUTPATIENT)
Dept: LAB | Facility: HOSPITAL | Age: 65
End: 2020-07-08

## 2020-07-08 LAB
ALBUMIN SERPL-MCNC: 3.6 G/DL (ref 3.5–5)
ALP SERPL-CCNC: 100 U/L (ref 45–120)
ALT SERPL W P-5'-P-CCNC: 16 U/L (ref 0–45)
ANION GAP SERPL CALCULATED.3IONS-SCNC: 8 MMOL/L (ref 5–18)
AST SERPL W P-5'-P-CCNC: 16 U/L (ref 0–40)
BILIRUB SERPL-MCNC: 0.4 MG/DL (ref 0–1)
BUN SERPL-MCNC: 9 MG/DL (ref 8–22)
CALCIUM SERPL-MCNC: 9.1 MG/DL (ref 8.5–10.5)
CHLORIDE BLD-SCNC: 107 MMOL/L (ref 98–107)
CO2 SERPL-SCNC: 26 MMOL/L (ref 22–31)
CREAT SERPL-MCNC: 0.7 MG/DL (ref 0.6–1.1)
GFR SERPL CREATININE-BSD FRML MDRD: >60 ML/MIN/1.73M2
GLUCOSE BLD-MCNC: 107 MG/DL (ref 70–125)
POTASSIUM BLD-SCNC: 4.4 MMOL/L (ref 3.5–5)
PROT SERPL-MCNC: 7.3 G/DL (ref 6–8)
SODIUM SERPL-SCNC: 141 MMOL/L (ref 136–145)
TSH SERPL DL<=0.005 MIU/L-ACNC: 2.11 UIU/ML (ref 0.3–5)
VIT B12 SERPL-MCNC: 512 PG/ML (ref 213–816)

## 2020-07-22 ENCOUNTER — RECORDS - HEALTHEAST (OUTPATIENT)
Dept: ADMINISTRATIVE | Facility: OTHER | Age: 65
End: 2020-07-22

## 2020-07-22 ENCOUNTER — TELEPHONE (OUTPATIENT)
Dept: NEUROLOGY | Facility: CLINIC | Age: 65
End: 2020-07-22

## 2020-07-22 DIAGNOSIS — R47.89 WORD FINDING DIFFICULTY: Primary | ICD-10-CM

## 2020-07-22 NOTE — TELEPHONE ENCOUNTER
Spoke with patient and relayed below message. Patient verbalized understanding with no further questions   Trevin Kwok on 7/22/2020 at 12:31 PM

## 2020-07-23 ENCOUNTER — HOSPITAL ENCOUNTER (OUTPATIENT)
Dept: MRI IMAGING | Facility: HOSPITAL | Age: 65
Discharge: HOME | End: 2020-07-23
Attending: PSYCHIATRY & NEUROLOGY

## 2020-07-23 ENCOUNTER — COMMUNICATION - HEALTHEAST (OUTPATIENT)
Dept: TELEHEALTH | Facility: CLINIC | Age: 65
End: 2020-07-23

## 2020-07-23 DIAGNOSIS — R41.3 FUNCTIONAL MEMORY PROBLEM: ICD-10-CM

## 2020-07-23 DIAGNOSIS — R47.89 WORD FINDING DIFFICULTY: ICD-10-CM

## 2020-07-29 ENCOUNTER — TELEPHONE (OUTPATIENT)
Dept: NEUROLOGY | Facility: CLINIC | Age: 65
End: 2020-07-29

## 2020-07-29 NOTE — TELEPHONE ENCOUNTER
Pt lm that she had a episode in which she woke up yesterday to the room spinning, leaning to the left, running into walls. Her eyes were fluttering back and forth. This lasted for a few hours. She was then fine. [phone]

## 2020-07-29 NOTE — TELEPHONE ENCOUNTER
EXAM: MR BRAIN W WO CONTRAST  LOCATION: [facility]  DATE/TIME: 7/23/2020 1:02 PM     INDICATION: Other speech disturbances. Mild cognitive appearance. Word finding difficulty, difficulty with expressive fluency.  COMPARISON: 02/08/2018.  CONTRAST: Gadavist 10ml  TECHNIQUE: Routine multiplanar multisequence head MRI without and with intravenous contrast.     FINDINGS:  INTRACRANIAL CONTENTS: No acute or subacute infarct. No mass, acute hemorrhage, or extra-axial fluid collections. No significant change in scattered nonspecific T2/FLAIR hyperintensities within the cerebral white matter most consistent with mild chronic   microvascular ischemic change.      Symmetric frontal, insular, and superolateral temporal lobe predominant brain parenchymal volume loss, most prominent at the opercular/periinsular region, unchanged. Mesial temporal lobes and entorhinal cortices are normal.      Normal position of the cerebellar tonsils. No pathologic contrast enhancement.     SELLA: No abnormality accounting for technique.  OSSEOUS STRUCTURES/SOFT TISSUES: Normal marrow signal. The major intracranial vascular flow voids are maintained.   ORBITS: No abnormality accounting for technique.   SINUSES/MASTOIDS: No paranasal sinus mucosal disease. No middle ear or mastoid effusion.         IMPRESSION:   1.  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  2.  Mild presumed chronic small vessel ischemic changes in cerebral matter.                 Read by:  John Ordoenz MD     Signed By:  John Ordonez MD on 7/24/2020 11:19 AM               Please advise   Trevin Kwok on 7/29/2020 at 11:34 AM

## 2020-07-30 NOTE — TELEPHONE ENCOUNTER
Pt lm that she had a episode in which she woke up yesterday to the room spinning, leaning to the left, running into walls. Her eyes were fluttering back and forth. This lasted for a few hours. She was then fine. [phone]    Trevin Kwok on 7/30/2020 at 8:21 AM

## 2020-08-04 ENCOUNTER — AMBULATORY - HEALTHEAST (OUTPATIENT)
Dept: NEUROLOGY | Facility: CLINIC | Age: 65
End: 2020-08-04

## 2020-08-04 DIAGNOSIS — R47.89 WORD FINDING DIFFICULTY: ICD-10-CM

## 2020-08-17 NOTE — TELEPHONE ENCOUNTER
"Pt lm this am that her left foot has been \"asleep\" for about a week, along with swelling in the ankles.   "

## 2020-08-19 NOTE — TELEPHONE ENCOUNTER
Pt left msg requesting a call back regarding her foot. She states it's been asleep for a week. [phone].

## 2020-08-19 NOTE — TELEPHONE ENCOUNTER
Needs a follow up visit for multiple new issues--and discuss MRI results, we aren't booked out too far.

## 2020-08-19 NOTE — TELEPHONE ENCOUNTER
TCB and asked if ok to leave a detailed message when call Is returned   Trevin Kwok on 8/19/2020 at 2:46 PM

## 2020-08-25 ENCOUNTER — OFFICE VISIT - HEALTHEAST (OUTPATIENT)
Dept: INTERNAL MEDICINE | Facility: CLINIC | Age: 65
End: 2020-08-25

## 2020-08-25 DIAGNOSIS — F02.80 FRONTO-TEMPORAL DEMENTIA (H): ICD-10-CM

## 2020-08-25 DIAGNOSIS — R73.03 PREDIABETES: ICD-10-CM

## 2020-08-25 DIAGNOSIS — G31.09 FRONTO-TEMPORAL DEMENTIA (H): ICD-10-CM

## 2020-08-25 DIAGNOSIS — F32.5 MAJOR DEPRESSION IN COMPLETE REMISSION (H): ICD-10-CM

## 2020-08-27 NOTE — TELEPHONE ENCOUNTER
Spoke with patient and relayed message. She is scheduled for a video visit on 9/14/2020 and is aware of the plan for check in and intake prior to 8 am appt  Trevin Kwok on 8/27/2020 at 2:13 PM

## 2020-09-08 ENCOUNTER — OFFICE VISIT - HEALTHEAST (OUTPATIENT)
Dept: INTERNAL MEDICINE | Facility: CLINIC | Age: 65
End: 2020-09-08

## 2020-09-08 DIAGNOSIS — G31.09 FRONTO-TEMPORAL DEMENTIA (H): ICD-10-CM

## 2020-09-08 DIAGNOSIS — M79.622 AXILLARY PAIN, LEFT: ICD-10-CM

## 2020-09-08 DIAGNOSIS — F02.80 FRONTO-TEMPORAL DEMENTIA (H): ICD-10-CM

## 2020-09-08 DIAGNOSIS — F32.5 MAJOR DEPRESSION IN COMPLETE REMISSION (H): ICD-10-CM

## 2020-09-09 ENCOUNTER — COMMUNICATION - HEALTHEAST (OUTPATIENT)
Dept: SCHEDULING | Facility: CLINIC | Age: 65
End: 2020-09-09

## 2020-09-11 PROBLEM — G31.09 FRONTO-TEMPORAL DEMENTIA (H): Status: ACTIVE | Noted: 2020-08-25

## 2020-09-11 PROBLEM — F02.80 FRONTO-TEMPORAL DEMENTIA (H): Status: ACTIVE | Noted: 2020-08-25

## 2020-09-14 ENCOUNTER — OFFICE VISIT (OUTPATIENT)
Dept: NEUROLOGY | Facility: CLINIC | Age: 65
End: 2020-09-14
Payer: COMMERCIAL

## 2020-09-14 ENCOUNTER — RECORDS - HEALTHEAST (OUTPATIENT)
Dept: ADMINISTRATIVE | Facility: OTHER | Age: 65
End: 2020-09-14

## 2020-09-14 VITALS — HEIGHT: 67 IN | BODY MASS INDEX: 45.99 KG/M2 | WEIGHT: 293 LBS

## 2020-09-14 DIAGNOSIS — G31.09 FRONTO-TEMPORAL DEMENTIA (H): ICD-10-CM

## 2020-09-14 DIAGNOSIS — R20.0 NUMBNESS OF LEFT FOOT: ICD-10-CM

## 2020-09-14 DIAGNOSIS — R47.89 WORD FINDING DIFFICULTY: Primary | ICD-10-CM

## 2020-09-14 DIAGNOSIS — F02.80 FRONTO-TEMPORAL DEMENTIA (H): ICD-10-CM

## 2020-09-14 PROCEDURE — 99214 OFFICE O/P EST MOD 30 MIN: CPT | Mod: GT | Performed by: PSYCHIATRY & NEUROLOGY

## 2020-09-14 ASSESSMENT — MIFFLIN-ST. JEOR: SCORE: 2025.07

## 2020-09-14 NOTE — NURSING NOTE
"Chief Complaint   Patient presents with     Follow Up     review results for MRI-States balance has been off lately and feels \"tippy\", recent ER visit for possible food poisoning as well ([facility])      Video Visit Smart Phone [phone] [employer]   Trevin Kwok on 9/14/2020 at 7:48 AM    "

## 2020-09-14 NOTE — LETTER
9/14/2020         RE: Concepción De  [address]  [place] [address]        Dear Colleague,    Thank you for referring your patient, Concepción De, to the [facility] NEUROLOGY [place]. Please see a copy of my visit note below.    [facility]    Concepción De MRN# [id number]   Age: 65 year old YOB: 1955               Assessment and Plan:   Assessment:   Ongoing speech difficulty        Plan:   Orders Placed This Encounter   Procedures     NEUROPSYCHOLOGY REFERRAL     We will ask for repeat neuropsych testing in December of this year (1 year after the previous neuropsych testing).  MRI of the brain raises concern for frontotemporal dementia, and primary progressive aphasia certainly could fit with her symptoms.  We have talked at length about what the future may hold in that case.  She is managing fine at home right now, though I told her she might not want to take on a caregiver role for her mother.  In the future she may need assisted living though that would be a ways off.      The numbness of the left foot likely represents a local compression injury to the tibial nerve while in her recliner.  Symptoms improving gradually, that should continue.  We could consider EMG testing if improvement plateaus.    I would like to see her back a couple weeks after the neuropsych testing.  She will give us a call in the meantime if there are specific questions or issues.             Chief Complaint/HPI:     I saw Concepción for a video visit today.  She continues to have some occasional speech difficulties.  Every so often she cannot get the words out.  She will really get stuck.  At other times that she does okay.  She is under more stress recently, her mother is at assisted living after a wrist fracture, and is adamant about going home.  Concepción and her brother will have to help their mother at home if she does go there.  A few weeks ago her left  foot fell asleep.  She describes numbness and tingling along the sole of the foot.  This came on after she was sitting in her recliner with the feet up for a while.  Symptoms have lessened since onset but have not resolved entirely.    She had MRI of the brain on July 23 which does show more focal atrophy in the frontal and temporal regions raising the possibility of frontotemporal dementia.  She has looked that up and asks several appropriate questions.            Past Medical History:    has a past medical history of Depressive disorder and Pre-diabetes.          Past Surgical History:    has a past surgical history that includes GYN surgery; ENT surgery; GI surgery; and appendectomy.          Social History:     Social History     Tobacco Use     Smoking status: Never Smoker     Smokeless tobacco: Never Used   Substance Use Topics     Alcohol use: Not Currently             Family History:     Family History   Problem Relation Age of Onset     Cancer Mother      Cancer Father      Cirrhosis Father                 Allergies:     Allergies   Allergen Reactions     Clozapine Anaphylaxis     Ciprofloxacin Hives and Itching     Nitrofurantoin Other (See Comments) and Unknown     Flu per Patient  Flu per Patient  Flu per Patient       Sulfasalazine      Other reaction(s): *Unknown             Medications:     Current Outpatient Medications:      ARIPiprazole (ABILIFY) 5 MG tablet, Take 7.5 mg by mouth daily, Disp: , Rfl:      aspirin (ASA) 325 MG tablet, Take 325 mg by mouth daily, Disp: , Rfl:      atenolol (TENORMIN) 25 MG tablet, Take 25 mg by mouth daily, Disp: , Rfl:      clonazePAM (KLONOPIN) 0.5 MG tablet, Take 0.5 mg by mouth 2 times daily as needed, Disp: , Rfl:      escitalopram (LEXAPRO) 20 MG tablet, Take 20 mg by mouth daily, Disp: , Rfl:      esomeprazole (NEXIUM) 40 MG DR capsule, Take 40 mg by mouth daily, Disp: , Rfl:      metFORMIN (GLUCOPHAGE) 500 MG tablet, Take 500 mg by mouth daily, Disp: , Rfl:  "     Multiple Vitamins-Minerals (PRESERVISION AREDS 2 PO), Take 1 tablet by mouth 2 times daily, Disp: , Rfl:      polyethylene glycol-propylene glycol (SYSTANE ULTRA) 0.4-0.3 % SOLN ophthalmic solution, Place 1 drop into both eyes every hour as needed for dry eyes, Disp: , Rfl:            Review of Systems:   No difficulty breathing or swallowing.            Physical Exam:   Awake and alert with no dysarthria  For the most part her speech is clear and coherent, however on several occasions she does get hung up on a word or phrase and has to back up and go around it.  Cranial nerves are fine  I do not see any focal or lateralized weakness or coordination difficulties in the arms    She does not appear groggy or overly anxious during our visit today.       The patient has been notified of following:     \"This video visit will be conducted via a call between you and your physician/provider. We have found that certain health care needs can be provided without the need for an in-person physical exam.  This service lets us provide the care you need with a video conversation.  If a prescription is necessary we can send it directly to your pharmacy.  If lab work is needed we can place an order for that and you can then stop by our lab to have the test done at a later time.    Video visits are billed at different rates depending on your insurance coverage.  Please reach out to your insurance provider with any questions.    If during the course of the call the physician/provider feels a video visit is not appropriate, you will not be charged for this service.\"    Patient has given verbal consent for Video visit? Yes      Video-Visit Details    Type of service:  Video Visit    Video Start Time: 8:08 AM  Video End Time: 8:28 AM    Originating Location (pt. Location): Home  Distant Location (provider location):  [facility] NEUROLOGY [place]   Platform used for Video Visit: arjun Choudhury MD   "       Again, thank you for allowing me to participate in the care of your patient.        Sincerely,        Richardson Choudhury MD

## 2020-09-14 NOTE — PROGRESS NOTES
[facility] Neurology  [place]    Concepción De MRN# [id number]   Age: 65 year old YOB: 1955               Assessment and Plan:   Assessment:   Ongoing speech difficulty        Plan:   Orders Placed This Encounter   Procedures     NEUROPSYCHOLOGY REFERRAL     We will ask for repeat neuropsych testing in December of this year (1 year after the previous neuropsych testing).  MRI of the brain raises concern for frontotemporal dementia, and primary progressive aphasia certainly could fit with her symptoms.  We have talked at length about what the future may hold in that case.  She is managing fine at home right now, though I told her she might not want to take on a caregiver role for her mother.  In the future she may need assisted living though that would be a ways off.      The numbness of the left foot likely represents a local compression injury to the tibial nerve while in her recliner.  Symptoms improving gradually, that should continue.  We could consider EMG testing if improvement plateaus.    I would like to see her back a couple weeks after the neuropsych testing.  She will give us a call in the meantime if there are specific questions or issues.             Chief Complaint/HPI:     I saw Concepción for a video visit today.  She continues to have some occasional speech difficulties.  Every so often she cannot get the words out.  She will really get stuck.  At other times that she does okay.  She is under more stress recently, her mother is at assisted living after a wrist fracture, and is adamant about going home.  Concepción and her brother will have to help their mother at home if she does go there.  A few weeks ago her left foot fell asleep.  She describes numbness and tingling along the sole of the foot.  This came on after she was sitting in her recliner with the feet up for a while.  Symptoms have lessened since onset but have not resolved entirely.    She had MRI of the brain on July  23 which does show more focal atrophy in the frontal and temporal regions raising the possibility of frontotemporal dementia.  She has looked that up and asks several appropriate questions.            Past Medical History:    has a past medical history of Depressive disorder and Pre-diabetes.          Past Surgical History:    has a past surgical history that includes GYN surgery; ENT surgery; GI surgery; and appendectomy.          Social History:     Social History     Tobacco Use     Smoking status: Never Smoker     Smokeless tobacco: Never Used   Substance Use Topics     Alcohol use: Not Currently             Family History:     Family History   Problem Relation Age of Onset     Cancer Mother      Cancer Father      Cirrhosis Father                 Allergies:     Allergies   Allergen Reactions     Clozapine Anaphylaxis     Ciprofloxacin Hives and Itching     Nitrofurantoin Other (See Comments) and Unknown     Flu per Patient  Flu per Patient  Flu per Patient       Sulfasalazine      Other reaction(s): *Unknown             Medications:     Current Outpatient Medications:      ARIPiprazole (ABILIFY) 5 MG tablet, Take 7.5 mg by mouth daily, Disp: , Rfl:      aspirin (ASA) 325 MG tablet, Take 325 mg by mouth daily, Disp: , Rfl:      atenolol (TENORMIN) 25 MG tablet, Take 25 mg by mouth daily, Disp: , Rfl:      clonazePAM (KLONOPIN) 0.5 MG tablet, Take 0.5 mg by mouth 2 times daily as needed, Disp: , Rfl:      escitalopram (LEXAPRO) 20 MG tablet, Take 20 mg by mouth daily, Disp: , Rfl:      esomeprazole (NEXIUM) 40 MG DR capsule, Take 40 mg by mouth daily, Disp: , Rfl:      metFORMIN (GLUCOPHAGE) 500 MG tablet, Take 500 mg by mouth daily, Disp: , Rfl:      Multiple Vitamins-Minerals (PRESERVISION AREDS 2 PO), Take 1 tablet by mouth 2 times daily, Disp: , Rfl:      polyethylene glycol-propylene glycol (SYSTANE ULTRA) 0.4-0.3 % SOLN ophthalmic solution, Place 1 drop into both eyes every hour as needed for dry eyes,  "Disp: , Rfl:            Review of Systems:   No difficulty breathing or swallowing.            Physical Exam:   Awake and alert with no dysarthria  For the most part her speech is clear and coherent, however on several occasions she does get hung up on a word or phrase and has to back up and go around it.  Cranial nerves are fine  I do not see any focal or lateralized weakness or coordination difficulties in the arms    She does not appear groggy or overly anxious during our visit today.       The patient has been notified of following:     \"This video visit will be conducted via a call between you and your physician/provider. We have found that certain health care needs can be provided without the need for an in-person physical exam.  This service lets us provide the care you need with a video conversation.  If a prescription is necessary we can send it directly to your pharmacy.  If lab work is needed we can place an order for that and you can then stop by our lab to have the test done at a later time.    Video visits are billed at different rates depending on your insurance coverage.  Please reach out to your insurance provider with any questions.    If during the course of the call the physician/provider feels a video visit is not appropriate, you will not be charged for this service.\"    Patient has given verbal consent for Video visit? Yes      Video-Visit Details    Type of service:  Video Visit    Video Start Time: 8:08 AM  Video End Time: 8:28 AM    Originating Location (pt. Location): Home  Distant Location (provider location):  [facility] NEUROLOGY [place]   Platform used for Video Visit: arjun hCoudhury MD         "

## 2020-09-30 ENCOUNTER — RECORDS - HEALTHEAST (OUTPATIENT)
Dept: ADMINISTRATIVE | Facility: OTHER | Age: 65
End: 2020-09-30

## 2020-10-02 ENCOUNTER — RECORDS - HEALTHEAST (OUTPATIENT)
Dept: ADMINISTRATIVE | Facility: OTHER | Age: 65
End: 2020-10-02

## 2020-10-07 ENCOUNTER — TELEPHONE (OUTPATIENT)
Dept: NEUROLOGY | Facility: CLINIC | Age: 65
End: 2020-10-07

## 2020-10-07 NOTE — TELEPHONE ENCOUNTER
DEVORAHTCB and asked if ok to leave detailed message when call is returned   Trevin Kwok CMA on 10/7/2020 at 12:24 PM

## 2020-10-07 NOTE — TELEPHONE ENCOUNTER
Spoke with patient and relayed that these referrals can sometimes take a bit to hear back on. Will refax over the referral and advised her to call back in a week if she still have not heard anything   Trevin Kwok CMA on 10/7/2020 at 3:22 PM

## 2020-10-26 NOTE — TELEPHONE ENCOUNTER
Pt called late Friday to report that she still has not heard from anyone to sched neuropsych. [phone]

## 2020-10-26 NOTE — TELEPHONE ENCOUNTER
Spoke with patient after follow up from the team at  and [facility]. Patient states she was not aware that she was recommended to schedule when they are able to bring her in for face to face, however I did relay that message to her and she knows that this will be a longer wait than previously anticipating. She will await call from scheduling and will call if needed   Trevin Kwok CMA on 10/26/2020 at 11:57 AM

## 2020-10-26 NOTE — TELEPHONE ENCOUNTER
Spoke with patient. Re-faxed again to [place] line. She will call back Friday if she still has not heard anything. Advised that I would follow up with our manager as well to escalate.   Trevin Kwok CMA on 10/26/2020 at 9:43 AM

## 2020-12-07 ENCOUNTER — TELEPHONE (OUTPATIENT)
Dept: NEUROLOGY | Facility: CLINIC | Age: 65
End: 2020-12-07

## 2020-12-07 NOTE — TELEPHONE ENCOUNTER
Pt calling about a tremor in her left arm. She also has cramping in her fingers. Pt wonders if it might be med related. Please call to discuss. [phone]

## 2020-12-07 NOTE — TELEPHONE ENCOUNTER
Patient has not been started on any new medication. Can I have you please advise how to proceed?   Trevin Kwok CMA on 12/7/2020 at 3:28 PM

## 2020-12-08 NOTE — TELEPHONE ENCOUNTER
She was not started on any medication that could have resulted in these symptoms.  She needs in person evaluation to decide on next steps and should make an appointment with Dr. Choudhury after current surge of the pandemic is over

## 2020-12-09 NOTE — TELEPHONE ENCOUNTER
Pt calling back. She now mentions a twitch in her eye as well. She is not in any distress,just concerned and it is bothersome. Any other advise to offer? [phone]

## 2020-12-10 NOTE — TELEPHONE ENCOUNTER
Pt called back. She got your message, but wonders about her arm tremors noted earlier in this message. She'll be home all day tomorrow. [phone]

## 2020-12-10 NOTE — TELEPHONE ENCOUNTER
I called Concepción, no answer, I left voicemail that the eye twitch is not anything worrisome, I suggested a liter bottle of tonic water over a couple of days, and that should help with eye twitch.

## 2020-12-11 ENCOUNTER — TELEPHONE (OUTPATIENT)
Dept: NEUROLOGY | Facility: CLINIC | Age: 65
End: 2020-12-11

## 2020-12-11 NOTE — TELEPHONE ENCOUNTER
Pt left msg complaining about twitching her arms. She's asking for a call back before the end today. [phone]

## 2020-12-14 NOTE — TELEPHONE ENCOUNTER
Attempted to call offsite however #will not accept a call from a blocked number. Can I have someone onsite call and discuss with patient?   Trevin Kwok CMA on 12/14/2020 at 8:21 AM

## 2020-12-22 ENCOUNTER — RECORDS - HEALTHEAST (OUTPATIENT)
Dept: ADMINISTRATIVE | Facility: OTHER | Age: 65
End: 2020-12-22

## 2020-12-30 ENCOUNTER — VIRTUAL VISIT (OUTPATIENT)
Dept: NEUROLOGY | Facility: CLINIC | Age: 65
End: 2020-12-30
Payer: COMMERCIAL

## 2020-12-30 ENCOUNTER — RECORDS - HEALTHEAST (OUTPATIENT)
Dept: ADMINISTRATIVE | Facility: OTHER | Age: 65
End: 2020-12-30

## 2020-12-30 VITALS — BODY MASS INDEX: 45.99 KG/M2 | HEIGHT: 67 IN | WEIGHT: 293 LBS

## 2020-12-30 DIAGNOSIS — R25.3 MUSCLE TWITCHING: Primary | ICD-10-CM

## 2020-12-30 DIAGNOSIS — R29.898 LEFT ARM WEAKNESS: ICD-10-CM

## 2020-12-30 PROCEDURE — 99214 OFFICE O/P EST MOD 30 MIN: CPT | Mod: 95 | Performed by: PSYCHIATRY & NEUROLOGY

## 2020-12-30 RX ORDER — ASPIRIN 81 MG/1
81 TABLET ORAL DAILY
COMMUNITY

## 2020-12-30 ASSESSMENT — MIFFLIN-ST. JEOR: SCORE: 2034.14

## 2020-12-30 NOTE — LETTER
"    12/30/2020         RE: Concepción De  [address]  [place] [address]        Dear Colleague,    Thank you for referring your patient, Concepción De, to the [facility] NEUROLOGY CLINIC [place]. Please see a copy of my visit note below.    [facility] Neurology  [place]    Concepción De MRN# [id number]   Age: 65 year old YOB: 1955               Assessment and Plan:   Assessment:   Twitching sensation in the left arm        Plan:   Orders Placed This Encounter   Procedures     MR Cervical Spine w/o Contrast     TSH with free T4 reflex     Comprehensive metabolic panel     Magnesium     EMG     We will check an MRI of the cervical spine to see if there is anything impinging the cord or roots.  I have ordered some lab work as well and we will have her come back for EMG to further evaluate the nerves and muscles in that left arm.  I will see her when she comes for the EMG and hopefully we will have the results of the other tests with us on that day as well.             Chief Complaint/HPI:     I saw Concepción for a video visit today.  I last saw her in September.  Plan is for repeat neuropsych testing next month and then follow-up after that.  Today she has complaint of left arm twitching.  It feels like the arm underneath the skin is twitching.  Symptoms have decreased since she first noticed it.  She does try to show me a lump in the left upper arm though it is hard to see it on the video, again this is subcutaneous.  She had some pain in the right axillary region and her GYN doctor saw some thickening of the skin there and that will be checked out.  She had also complained of an eye twitch, that is gone.  In the left arm she describes a crawling feeling under the skin.  I asked how often that happens and she said that \"it is kind of more constant.\"  I asked her about any weakness and she says that she feels more weak by the wrist on the left but she " had a hard time describing that further.            Past Medical History:    has a past medical history of Depressive disorder and Pre-diabetes.          Past Surgical History:    has a past surgical history that includes GYN surgery; ENT surgery; GI surgery; and appendectomy.          Social History:     Social History     Tobacco Use     Smoking status: Never Smoker     Smokeless tobacco: Never Used   Substance Use Topics     Alcohol use: Not Currently             Family History:     Family History   Problem Relation Age of Onset     Cancer Mother      Cancer Father      Cirrhosis Father                 Allergies:     Allergies   Allergen Reactions     Clozapine Anaphylaxis     Ciprofloxacin Hives and Itching     Nitrofurantoin Other (See Comments) and Unknown     Flu per Patient  Flu per Patient  Flu per Patient       Sulfasalazine      Other reaction(s): *Unknown             Medications:     Current Outpatient Medications:      ARIPiprazole (ABILIFY) 5 MG tablet, Take 7.5 mg by mouth daily, Disp: , Rfl:      aspirin 81 MG EC tablet, Take 81 mg by mouth daily, Disp: , Rfl:      atenolol (TENORMIN) 25 MG tablet, Take 25 mg by mouth daily, Disp: , Rfl:      clonazePAM (KLONOPIN) 0.5 MG tablet, Take 0.5 mg by mouth 2 times daily as needed, Disp: , Rfl:      escitalopram (LEXAPRO) 20 MG tablet, Take 20 mg by mouth daily, Disp: , Rfl:      esomeprazole (NEXIUM) 40 MG DR capsule, Take 40 mg by mouth daily, Disp: , Rfl:      metFORMIN (GLUCOPHAGE) 500 MG tablet, Take 500 mg by mouth daily, Disp: , Rfl:      Multiple Vitamins-Minerals (PRESERVISION AREDS 2 PO), , Disp: , Rfl:      polyethylene glycol-propylene glycol (SYSTANE ULTRA) 0.4-0.3 % SOLN ophthalmic solution, Place 1 drop into both eyes every hour as needed for dry eyes, Disp: , Rfl:            Review of Systems:   No difficulty breathing or swallowing            Physical Exam:   Awake and alert with no aphasia no dysarthria  Speech is clear and  "coherent  Cranial nerves are fine  I do not see any focal or lateralized weakness or coordination difficulties in the arms  Gait looks steady here on the video.         The patient has been notified of following:     \"This video visit will be conducted via a call between you and your physician/provider. We have found that certain health care needs can be provided without the need for an in-person physical exam.  This service lets us provide the care you need with a video conversation.  If a prescription is necessary we can send it directly to your pharmacy.  If lab work is needed we can place an order for that and you can then stop by our lab to have the test done at a later time.    Video visits are billed at different rates depending on your insurance coverage.  Please reach out to your insurance provider with any questions.    If during the course of the call the physician/provider feels a video visit is not appropriate, you will not be charged for this service.\"    Patient has given verbal consent for Video visit? Yes      Video-Visit Details    Type of service:  Video Visit    Video Start Time: 9:13 AM  Video End Time: 9:22 AM    Originating Location (pt. Location): Home  Distant Location (provider location):  [facility] NEUROLOGY [place]   Platform used for Video Visit: [facility]            Richardson Choudhury MD             Again, thank you for allowing me to participate in the care of your patient.        Sincerely,        Richardson Choudhury MD    "

## 2020-12-31 ENCOUNTER — RECORDS - HEALTHEAST (OUTPATIENT)
Dept: LAB | Facility: HOSPITAL | Age: 65
End: 2020-12-31

## 2020-12-31 ENCOUNTER — HOSPITAL ENCOUNTER (OUTPATIENT)
Dept: MAMMOGRAPHY | Facility: CLINIC | Age: 65
Discharge: HOME | End: 2020-12-31
Attending: STUDENT IN AN ORGANIZED HEALTH CARE EDUCATION/TRAINING PROGRAM

## 2020-12-31 DIAGNOSIS — N64.4 BREAST PAIN: ICD-10-CM

## 2020-12-31 DIAGNOSIS — R92.30 BREAST DENSITY: ICD-10-CM

## 2020-12-31 LAB
ALBUMIN SERPL-MCNC: 3.7 G/DL (ref 3.5–5)
ALP SERPL-CCNC: 111 U/L (ref 45–120)
ALT SERPL W P-5'-P-CCNC: 17 U/L (ref 0–45)
ANION GAP SERPL CALCULATED.3IONS-SCNC: 8 MMOL/L (ref 5–18)
AST SERPL W P-5'-P-CCNC: 16 U/L (ref 0–40)
BILIRUB SERPL-MCNC: 0.5 MG/DL (ref 0–1)
BUN SERPL-MCNC: 10 MG/DL (ref 8–22)
CALCIUM SERPL-MCNC: 8.6 MG/DL (ref 8.5–10.5)
CHLORIDE BLD-SCNC: 104 MMOL/L (ref 98–107)
CO2 SERPL-SCNC: 29 MMOL/L (ref 22–31)
CREAT SERPL-MCNC: 0.7 MG/DL (ref 0.6–1.1)
GFR SERPL CREATININE-BSD FRML MDRD: >60 ML/MIN/1.73M2
GLUCOSE BLD-MCNC: 111 MG/DL (ref 70–125)
MAGNESIUM SERPL-MCNC: 2.1 MG/DL (ref 1.8–2.6)
POTASSIUM BLD-SCNC: 4.4 MMOL/L (ref 3.5–5)
PROT SERPL-MCNC: 7.4 G/DL (ref 6–8)
SODIUM SERPL-SCNC: 141 MMOL/L (ref 136–145)
TSH SERPL DL<=0.005 MIU/L-ACNC: 1.56 UIU/ML (ref 0.3–5)

## 2021-01-01 NOTE — PROGRESS NOTES
"[facility]  [place]    Concepción De MRN# [id number]   Age: 65 year old YOB: 1955               Assessment and Plan:   Assessment:   Twitching sensation in the left arm        Plan:   Orders Placed This Encounter   Procedures     MR Cervical Spine w/o Contrast     TSH with free T4 reflex     Comprehensive metabolic panel     Magnesium     EMG     We will check an MRI of the cervical spine to see if there is anything impinging the cord or roots.  I have ordered some lab work as well and we will have her come back for EMG to further evaluate the nerves and muscles in that left arm.  I will see her when she comes for the EMG and hopefully we will have the results of the other tests with us on that day as well.             Chief Complaint/HPI:     I saw Concepción for a video visit today.  I last saw her in September.  Plan is for repeat neuropsych testing next month and then follow-up after that.  Today she has complaint of left arm twitching.  It feels like the arm underneath the skin is twitching.  Symptoms have decreased since she first noticed it.  She does try to show me a lump in the left upper arm though it is hard to see it on the video, again this is subcutaneous.  She had some pain in the right axillary region and her GYN doctor saw some thickening of the skin there and that will be checked out.  She had also complained of an eye twitch, that is gone.  In the left arm she describes a crawling feeling under the skin.  I asked how often that happens and she said that \"it is kind of more constant.\"  I asked her about any weakness and she says that she feels more weak by the wrist on the left but she had a hard time describing that further.            Past Medical History:    has a past medical history of Depressive disorder and Pre-diabetes.          Past Surgical History:    has a past surgical history that includes GYN surgery; ENT surgery; GI surgery; and appendectomy.      " "    Social History:     Social History     Tobacco Use     Smoking status: Never Smoker     Smokeless tobacco: Never Used   Substance Use Topics     Alcohol use: Not Currently             Family History:     Family History   Problem Relation Age of Onset     Cancer Mother      Cancer Father      Cirrhosis Father                 Allergies:     Allergies   Allergen Reactions     Clozapine Anaphylaxis     Ciprofloxacin Hives and Itching     Nitrofurantoin Other (See Comments) and Unknown     Flu per Patient  Flu per Patient  Flu per Patient       Sulfasalazine      Other reaction(s): *Unknown             Medications:     Current Outpatient Medications:      ARIPiprazole (ABILIFY) 5 MG tablet, Take 7.5 mg by mouth daily, Disp: , Rfl:      aspirin 81 MG EC tablet, Take 81 mg by mouth daily, Disp: , Rfl:      atenolol (TENORMIN) 25 MG tablet, Take 25 mg by mouth daily, Disp: , Rfl:      clonazePAM (KLONOPIN) 0.5 MG tablet, Take 0.5 mg by mouth 2 times daily as needed, Disp: , Rfl:      escitalopram (LEXAPRO) 20 MG tablet, Take 20 mg by mouth daily, Disp: , Rfl:      esomeprazole (NEXIUM) 40 MG DR capsule, Take 40 mg by mouth daily, Disp: , Rfl:      metFORMIN (GLUCOPHAGE) 500 MG tablet, Take 500 mg by mouth daily, Disp: , Rfl:      Multiple Vitamins-Minerals (PRESERVISION AREDS 2 PO), , Disp: , Rfl:      polyethylene glycol-propylene glycol (SYSTANE ULTRA) 0.4-0.3 % SOLN ophthalmic solution, Place 1 drop into both eyes every hour as needed for dry eyes, Disp: , Rfl:            Review of Systems:   No difficulty breathing or swallowing            Physical Exam:   Awake and alert with no aphasia no dysarthria  Speech is clear and coherent  Cranial nerves are fine  I do not see any focal or lateralized weakness or coordination difficulties in the arms  Gait looks steady here on the video.         The patient has been notified of following:     \"This video visit will be conducted via a call between you and your " "physician/provider. We have found that certain health care needs can be provided without the need for an in-person physical exam.  This service lets us provide the care you need with a video conversation.  If a prescription is necessary we can send it directly to your pharmacy.  If lab work is needed we can place an order for that and you can then stop by our lab to have the test done at a later time.    Video visits are billed at different rates depending on your insurance coverage.  Please reach out to your insurance provider with any questions.    If during the course of the call the physician/provider feels a video visit is not appropriate, you will not be charged for this service.\"    Patient has given verbal consent for Video visit? Yes      Video-Visit Details    Type of service:  Video Visit    Video Start Time: 9:13 AM  Video End Time: 9:22 AM    Originating Location (pt. Location): Home  Distant Location (provider location):  [facility]UR[facility] [place]   Platform used for Video Visit: Lei Choudhury MD         "

## 2021-01-11 ENCOUNTER — HOSPITAL ENCOUNTER (OUTPATIENT)
Dept: MRI IMAGING | Facility: HOSPITAL | Age: 66
Discharge: HOME | End: 2021-01-11
Attending: PSYCHIATRY & NEUROLOGY

## 2021-01-11 DIAGNOSIS — R29.898 LEFT ARM WEAKNESS: ICD-10-CM

## 2021-01-11 DIAGNOSIS — R25.3 MUSCLE TWITCHING: ICD-10-CM

## 2021-01-21 PROBLEM — F32.A DEPRESSIVE DISORDER: Status: ACTIVE | Noted: 2020-12-22

## 2021-01-21 PROBLEM — F41.9 ANXIETY: Status: ACTIVE | Noted: 2020-12-22

## 2021-01-21 PROBLEM — D64.9 ANEMIA: Status: ACTIVE | Noted: 2021-01-21

## 2021-01-21 PROBLEM — F31.9 BIPOLAR 1 DISORDER (H): Status: ACTIVE | Noted: 2021-01-15

## 2021-01-21 PROBLEM — I48.91 ATRIAL FIBRILLATION (H): Status: ACTIVE | Noted: 2021-01-21

## 2021-01-21 PROBLEM — K66.0 ABDOMINAL ADHESIONS: Status: ACTIVE | Noted: 2021-01-15

## 2021-01-21 PROBLEM — M19.90 ARTHRITIS: Status: ACTIVE | Noted: 2020-12-22

## 2021-01-21 PROBLEM — F02.80: Status: ACTIVE | Noted: 2020-08-25

## 2021-01-21 PROBLEM — G31.09: Status: ACTIVE | Noted: 2020-08-25

## 2021-01-25 ENCOUNTER — OFFICE VISIT (OUTPATIENT)
Dept: NEUROLOGY | Facility: CLINIC | Age: 66
End: 2021-01-25
Attending: PSYCHIATRY & NEUROLOGY
Payer: COMMERCIAL

## 2021-01-25 VITALS
DIASTOLIC BLOOD PRESSURE: 72 MMHG | BODY MASS INDEX: 45.99 KG/M2 | RESPIRATION RATE: 16 BRPM | HEIGHT: 67 IN | SYSTOLIC BLOOD PRESSURE: 131 MMHG | WEIGHT: 293 LBS | HEART RATE: 56 BPM

## 2021-01-25 DIAGNOSIS — G31.09 FRONTO-TEMPORAL DEMENTIA (H): ICD-10-CM

## 2021-01-25 DIAGNOSIS — R25.3 MUSCLE TWITCHING: Primary | ICD-10-CM

## 2021-01-25 DIAGNOSIS — R25.3 MUSCLE TWITCHING: ICD-10-CM

## 2021-01-25 DIAGNOSIS — R93.7 ABNORMAL MRI, CERVICAL SPINE: ICD-10-CM

## 2021-01-25 DIAGNOSIS — F02.80 FRONTO-TEMPORAL DEMENTIA (H): ICD-10-CM

## 2021-01-25 DIAGNOSIS — R29.898 LEFT ARM WEAKNESS: ICD-10-CM

## 2021-01-25 PROCEDURE — 95886 MUSC TEST DONE W/N TEST COMP: CPT | Mod: LT | Performed by: PSYCHIATRY & NEUROLOGY

## 2021-01-25 PROCEDURE — 95909 NRV CNDJ TST 5-6 STUDIES: CPT | Performed by: PSYCHIATRY & NEUROLOGY

## 2021-01-25 PROCEDURE — 99214 OFFICE O/P EST MOD 30 MIN: CPT | Mod: 25 | Performed by: PSYCHIATRY & NEUROLOGY

## 2021-01-25 ASSESSMENT — MIFFLIN-ST. JEOR: SCORE: 2025.07

## 2021-01-25 NOTE — PROGRESS NOTES
[facility]  [place]    Concepción De MRN# [id number]   Age: 65 year old YOB: 1955               Assessment and Plan:   Assessment:   Frontotemporal dementia, primary progressive aphasia    Twitching in the left arm, given the normal EMG and normal clinical exam I think this is a benign situation.    Spinal cord signal change, this does not correspond with the arm symptoms and does not appear to represent an acute or recurring issue.        Plan:     Plan is to repeat neurocognitive testing this summer, hopefully after quarantine restrictions are lifted and this can be done face-to-face.    No further work-up for the left arm symptoms.  I did suggest some gentle strengthening exercises.    At some point it may be worth repeating the C-spine MRI to see if there is any change in the vague spinal cord lesion.             Chief Complaint/HPI:     I saw Concepción after her EMG today.  EMG of the left arm is perfectly normal.  She still has some of the twitching in the left upper arm and an odd crawling sensation in the left forearm intermittently.  We went over her labs, CMP and TSH are fine.    MRI of the C-spine shows a vague T2 signal change in the spinal cord at about the T1 level.  After contrast there is no enhancement.  We looked at these images here in the clinic together.  We also looked at her brain MRI from July 2020, this showed no similar T2 hyperintensities (though does show atrophy mainly in the frontal regions).    She continues to have difficulty with word finding.  She describes it as feeling like she is missing or looking for words like you would scrabble tiles.  She currently lives in a townhouse with a lower level walk out but she does not go downstairs very much.  She manages okay, but does have difficulty getting the dishes cleaned up and keeping things tidy.  We talked about a senior building or assisted living.  She is already on a waiting list at [facility] and  "I was happy to hear that.            Past Medical History:    has a past medical history of Depressive disorder and Pre-diabetes.          Past Surgical History:    has a past surgical history that includes GYN surgery; ENT surgery; GI surgery; and appendectomy.          Social History:     Social History     Tobacco Use     Smoking status: Never Smoker     Smokeless tobacco: Never Used   Substance Use Topics     Alcohol use: Not Currently             Family History:     Family History   Problem Relation Age of Onset     Cancer Mother      Cancer Father      Cirrhosis Father                 Allergies:     Allergies   Allergen Reactions     Clozapine Anaphylaxis     Ciprofloxacin Hives and Itching     Nitrofurantoin Other (See Comments) and Unknown     Flu per Patient  Flu per Patient  Flu per Patient       Sulfa Drugs      Sulfasalazine      Other reaction(s): *Unknown             Medications:     Current Outpatient Medications:      ARIPiprazole (ABILIFY) 5 MG tablet, Take 7.5 mg by mouth daily, Disp: , Rfl:      aspirin 81 MG EC tablet, Take 81 mg by mouth daily, Disp: , Rfl:      atenolol (TENORMIN) 25 MG tablet, Take 25 mg by mouth daily, Disp: , Rfl:      clonazePAM (KLONOPIN) 0.5 MG tablet, Take 0.5 mg by mouth 2 times daily as needed, Disp: , Rfl:      escitalopram (LEXAPRO) 20 MG tablet, Take 20 mg by mouth daily, Disp: , Rfl:      esomeprazole (NEXIUM) 40 MG DR capsule, Take 40 mg by mouth daily, Disp: , Rfl:      metFORMIN (GLUCOPHAGE) 500 MG tablet, Take 500 mg by mouth daily, Disp: , Rfl:      Multiple Vitamins-Minerals (PRESERVISION AREDS 2 PO), , Disp: , Rfl:      polyethylene glycol-propylene glycol (SYSTANE ULTRA) 0.4-0.3 % SOLN ophthalmic solution, Place 1 drop into both eyes every hour as needed for dry eyes, Disp: , Rfl:                 Physical Exam:     /72 (BP Location: Right arm, Patient Position: Sitting, Cuff Size: Adult Regular)   Pulse 56   Resp 16   Ht 1.702 m (5' 7\")   Wt 144.2 " kg (318 lb)   LMP  (LMP Unknown)   BMI 49.81 kg/m     Awake, alert, occasional word finding difficulty but she does manage fairly well, no dysarthria  attention is fine  Cranial nerves II - XII tested and intact, no nystagmus  There is no focal or generalized weakness in the extremities  Finger nose finger testing is normal  Rapid alternating movements are normal on both sides  Tone is normal on both sides  Reflexes are normal and symmetric throughout  But I do not see any visible twitching or fasciculations in the left arm  Gait is normal             Richardson Choudhury MD

## 2021-01-25 NOTE — LETTER
1/25/2021         RE: Concepción De  [address]  [place] [address]        Dear Colleague,    Thank you for referring your patient, Concepción De, to the [facility] [place]. Please see a copy of my visit note below.    [facility]  [place]    Concepción De MRN# [id number]   Age: 65 year old YOB: 1955               Assessment and Plan:   Assessment:   Frontotemporal dementia, primary progressive aphasia    Twitching in the left arm, given the normal EMG and normal clinical exam I think this is a benign situation.    Spinal cord signal change, this does not correspond with the arm symptoms and does not appear to represent an acute or recurring issue.        Plan:     Plan is to repeat neurocognitive testing this summer, hopefully after quarantine restrictions are lifted and this can be done face-to-face.    No further work-up for the left arm symptoms.  I did suggest some gentle strengthening exercises.    At some point it may be worth repeating the C-spine MRI to see if there is any change in the vague spinal cord lesion.             Chief Complaint/HPI:     I saw Concepción after her EMG today.  EMG of the left arm is perfectly normal.  She still has some of the twitching in the left upper arm and an odd crawling sensation in the left forearm intermittently.  We went over her labs, CMP and TSH are fine.    MRI of the C-spine shows a vague T2 signal change in the spinal cord at about the T1 level.  After contrast there is no enhancement.  We looked at these images here in the clinic together.  We also looked at her brain MRI from July 2020, this showed no similar T2 hyperintensities (though does show atrophy mainly in the frontal regions).    She continues to have difficulty with word finding.  She describes it as feeling like she is missing or looking for words like you would scrabble tiles.  She currently lives in a  [facility] [address] with a lower level walk out but she does not go downstairs very much.  She manages okay, but does have difficulty getting the dishes cleaned up and keeping things tidy.  We talked about a senior building or assisted living.  She is already on a waiting list at [facility] and I was happy to hear that.            Past Medical History:    has a past medical history of Depressive disorder and Pre-diabetes.          Past Surgical History:    has a past surgical history that includes GYN surgery; ENT surgery; GI surgery; and appendectomy.          Social History:     Social History     Tobacco Use     Smoking status: Never Smoker     Smokeless tobacco: Never Used   Substance Use Topics     Alcohol use: Not Currently             Family History:     Family History   Problem Relation Age of Onset     Cancer Mother      Cancer Father      Cirrhosis Father                 Allergies:     Allergies   Allergen Reactions     Clozapine Anaphylaxis     Ciprofloxacin Hives and Itching     Nitrofurantoin Other (See Comments) and Unknown     Flu per Patient  Flu per Patient  Flu per Patient       Sulfa Drugs      Sulfasalazine      Other reaction(s): *Unknown             Medications:     Current Outpatient Medications:      ARIPiprazole (ABILIFY) 5 MG tablet, Take 7.5 mg by mouth daily, Disp: , Rfl:      aspirin 81 MG EC tablet, Take 81 mg by mouth daily, Disp: , Rfl:      atenolol (TENORMIN) 25 MG tablet, Take 25 mg by mouth daily, Disp: , Rfl:      clonazePAM (KLONOPIN) 0.5 MG tablet, Take 0.5 mg by mouth 2 times daily as needed, Disp: , Rfl:      escitalopram (LEXAPRO) 20 MG tablet, Take 20 mg by mouth daily, Disp: , Rfl:      esomeprazole (NEXIUM) 40 MG DR capsule, Take 40 mg by mouth daily, Disp: , Rfl:      metFORMIN (GLUCOPHAGE) 500 MG tablet, Take 500 mg by mouth daily, Disp: , Rfl:      Multiple Vitamins-Minerals (PRESERVISION AREDS 2 PO), , Disp: , Rfl:      polyethylene glycol-propylene glycol (SYSTANE ULTRA) 0.4-0.3  "% SOLN ophthalmic solution, Place 1 drop into both eyes every hour as needed for dry eyes, Disp: , Rfl:                 Physical Exam:     /72 (BP Location: Right arm, Patient Position: Sitting, Cuff Size: Adult Regular)   Pulse 56   Resp 16   Ht 1.702 m (5' 7\")   Wt 144.2 kg (318 lb)   LMP  (LMP Unknown)   BMI 49.81 kg/m     Awake, alert, occasional word finding difficulty but she does manage fairly well, no dysarthria  attention is fine  Cranial nerves II - XII tested and intact, no nystagmus  There is no focal or generalized weakness in the extremities  Finger nose finger testing is normal  Rapid alternating movements are normal on both sides  Tone is normal on both sides  Reflexes are normal and symmetric throughout  But I do not see any visible twitching or fasciculations in the left arm  Gait is normal             Richardson Choudhury MD             Again, thank you for allowing me to participate in the care of your patient.        Sincerely,        Richardson Choudhury MD    "

## 2021-01-25 NOTE — LETTER
1/25/2021         RE: Concepción De  [address]  [place] [facility] [address]        Dear Colleague,    Thank you for referring your patient, Concepción De, to the [facility] [place]. Please see a copy of my visit note below.    Epic requires a note here      Again, thank you for allowing me to participate in the care of your patient.        Sincerely,        Richardson Choudhury MD

## 2021-01-25 NOTE — NURSING NOTE
Chief Complaint   Patient presents with     Follow Up     EMG follow up      Trevin Kwok CMA on 1/25/2021 at 9:20 AM

## 2021-01-25 NOTE — PROCEDURES
[facility]    Formerly [facility]  [address] [place] [address], [address]  [place] [address]  Tel: [phone]  Fax: [phone]          Full Name: Concepción De Gender: Female  Patient ID: [id number] YOB: 1955      Visit Date: 1/25/2021 08:21  Age: 65 Years 7 Months Old  Interpreted By: Richardson Choudhury MD   Ref Dr.: Keya Dailey MD  Tech:    Height: 5 feet 7 inch  Reason for referral: Evaluate left upper. c/o twitching in left arm on and off > 2 months. Prediabetic.      Motor NCS      Nerve / Sites Lat Amp Dist Jesús    ms mV cm m/s   L Median - APB      Wrist 3.33 9.2 7       Elbow 7.50 8.7 21 50   L Ulnar - ADM      Wrist 2.50 8.9 7       B.Elbow 6.35 7.9 20 52      A.Elbow 8.13 7.8 10 56       F  Wave      Nerve Fmin    ms   L Ulnar - ADM 29.38       Sensory NCS      Nerve / Sites Onset Lat Pk Lat PP Amp Dist    ms ms  V cm   L Median - II (Antidr)      Wrist 2.50 3.23 73.3 13   L Ulnar - V (Antidr)      Wrist 2.14 2.81 31.6 11   L Median, Ulnar - Transcarpal comparison      Median Palm 1.77 2.19 24.0 8      Ulnar Palm 1.88 2.19 19.7 8       EMG Summary Table     Spontaneous MUAP Rcmt Note   Muscle Fib PSW Fasc IA # Amp Dur PPP Rate Type   L. Brachioradialis None None None N N N N N N N   L. Pronator teres None None None N N N N N N N   L. Biceps brachii None None None N N N N N N N   L. Deltoid None None None N N N N N N N   L. Triceps brachii None None None N N N N N N N   L. Flexor carpi ulnaris None None None N N N N N N N   L. Extensor digitorum communis None None None N N N N N N N   L. First dorsal interosseous None None None N N N N N N N   L. Abductor pollicis brevis None None None N N N N N N N     History:  This patient is a 65-year-old woman with complaints of twitching in the left arm on and off.  This EMG is performed to evaluate for nerve or muscle dysfunction.    Findings:  Motor nerve conduction studies of the left  median and left ulnar nerves are well within normal limits.  F-wave latency in the left ulnar nerve is normal.    Sensory studies of the left median and left ulnar nerves are normal with robust amplitudes.    Needle EMG of selected muscles throughout the left arm shows no abnormal spontaneous activity and motor units of normal configuration and recruitment.    Conclusion:  This is a normal EMG of the left arm.

## 2021-04-07 ENCOUNTER — TRANSFERRED RECORDS (OUTPATIENT)
Dept: HEALTH INFORMATION MANAGEMENT | Facility: CLINIC | Age: 66
End: 2021-04-07

## 2021-04-07 LAB — RETINOPATHY: NEGATIVE

## 2021-05-11 ENCOUNTER — OFFICE VISIT (OUTPATIENT)
Dept: NEUROLOGY | Facility: CLINIC | Age: 66
End: 2021-05-11
Payer: COMMERCIAL

## 2021-05-11 ENCOUNTER — RECORDS - HEALTHEAST (OUTPATIENT)
Dept: ADMINISTRATIVE | Facility: OTHER | Age: 66
End: 2021-05-11

## 2021-05-11 ENCOUNTER — RECORDS - HEALTHEAST (OUTPATIENT)
Dept: NEUROLOGY | Facility: CLINIC | Age: 66
End: 2021-05-11

## 2021-05-11 VITALS
BODY MASS INDEX: 45.99 KG/M2 | SYSTOLIC BLOOD PRESSURE: 129 MMHG | DIASTOLIC BLOOD PRESSURE: 71 MMHG | HEART RATE: 57 BPM | WEIGHT: 293 LBS | HEIGHT: 67 IN

## 2021-05-11 DIAGNOSIS — F02.80 FRONTAL DEMENTIA (H): ICD-10-CM

## 2021-05-11 DIAGNOSIS — F02.80 FRONTO-TEMPORAL DEMENTIA (H): Primary | ICD-10-CM

## 2021-05-11 DIAGNOSIS — R20.0 NUMBNESS OF LOWER EXTREMITY: ICD-10-CM

## 2021-05-11 DIAGNOSIS — H53.2 DIPLOPIA: ICD-10-CM

## 2021-05-11 DIAGNOSIS — R06.02 SHORTNESS OF BREATH: ICD-10-CM

## 2021-05-11 DIAGNOSIS — G31.09 FRONTAL DEMENTIA (H): ICD-10-CM

## 2021-05-11 DIAGNOSIS — R20.0 NUMBNESS OF LOWER LIMB: ICD-10-CM

## 2021-05-11 DIAGNOSIS — G31.09 FRONTO-TEMPORAL DEMENTIA (H): Primary | ICD-10-CM

## 2021-05-11 PROCEDURE — 99214 OFFICE O/P EST MOD 30 MIN: CPT | Performed by: PSYCHIATRY & NEUROLOGY

## 2021-05-11 RX ORDER — FUROSEMIDE 20 MG
TABLET ORAL
COMMUNITY
Start: 2021-04-15

## 2021-05-11 RX ORDER — FAMOTIDINE 20 MG/1
20 TABLET, FILM COATED ORAL
COMMUNITY
Start: 2021-03-04 | End: 2022-11-15

## 2021-05-11 ASSESSMENT — MIFFLIN-ST. JEOR: SCORE: 2029.6

## 2021-05-11 NOTE — PROGRESS NOTES
[facility]  [place]    Concepción De MRN# [id number]   Age: 65 year old YOB: 1955               Assessment and Plan:   Assessment:   Primary progressive aphasia  Some convergence insufficiency versus slight medial rectus palsy.  She is already had MRI of the brain and myasthenia antibodies checked.  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  Imbalance-she does have some loss of sensation in the feet but reflexes are good (myelopathy versus neuropathy).        Plan:   Orders Placed This Encounter   Procedures     MR Brain w/o & w Contrast     CK total     Vitamin B6 ([facility])     Vitamin B1 whole blood     Protein Immunofixation Serum     Methylmalonic Acid     NEUROPSYCHOLOGY REFERRAL     I have ordered further labs looking for issues that might cause neuropathy.  I would like to repeat the brain MRI now with contrast looking for any sign of inflammation at the midbrain.    We delayed her repeat neuropsych testing due to the coronavirus pandemic, we will go ahead and put in the referral for that now.             Chief Complaint/HPI:     I saw Concepción for follow-up today here in our [place] office.  She was instructed to follow-up with us from by her ophthalmologist after an eye exam last month showed some exophoria bilaterally.  The patient had also mentioned that when looking up the left lid might not want to open right away.  It was recommended that acetylcholine receptor antibodies be checked.  That was actually done by her primary care physician, results came back normal (binding antibodies, blocking antibodies, modulating antibodies, striational antibodies, musk antibodies).  She complains the right eyeball jiggles (eyeball, not eyelid).  She sometimes chokes when drinking water, sometimes chokes on her normal secretions.  She has developed some shortness of breath with exertion.  She does mention double vision when looking at things closer in, further way  is fine.    No significant new issues in terms of aphasia or memory issues.            Past Medical History:    has a past medical history of Depressive disorder and Pre-diabetes.          Past Surgical History:    has a past surgical history that includes GYN surgery; ENT surgery; GI surgery; and appendectomy.          Social History:     Social History     Tobacco Use     Smoking status: Never Smoker     Smokeless tobacco: Never Used   Substance Use Topics     Alcohol use: Not Currently             Family History:     Family History   Problem Relation Age of Onset     Cancer Mother      Cancer Father      Cirrhosis Father                 Allergies:     Allergies   Allergen Reactions     Clozapine Anaphylaxis     Ciprofloxacin Hives and Itching     Nitrofurantoin Other (See Comments) and Unknown     Flu per Patient  Flu per Patient  Flu per Patient       Pantoprazole Other (See Comments)     Extremity swelling, dry mouth     Sulfa Drugs      Sulfasalazine      Other reaction(s): *Unknown             Medications:     Current Outpatient Medications:      ARIPiprazole (ABILIFY) 5 MG tablet, Take 7.5 mg by mouth daily, Disp: , Rfl:      aspirin 81 MG EC tablet, Take 81 mg by mouth daily, Disp: , Rfl:      atenolol (TENORMIN) 25 MG tablet, Take 25 mg by mouth daily, Disp: , Rfl:      clonazePAM (KLONOPIN) 0.5 MG tablet, Take 0.5 mg by mouth 2 times daily as needed, Disp: , Rfl:      escitalopram (LEXAPRO) 20 MG tablet, Take 20 mg by mouth daily, Disp: , Rfl:      famotidine (PEPCID) 20 MG tablet, Take 20 mg by mouth, Disp: , Rfl:      furosemide (LASIX) 20 MG tablet, TAKE 1/2 TO 1 TABLET BY MOUTH DAILY AS NEEDED FOR LOWER EXTREMITY SWELLING, Disp: , Rfl:      metFORMIN (GLUCOPHAGE) 500 MG tablet, Take 500 mg by mouth daily, Disp: , Rfl:      Multiple Vitamins-Minerals (PRESERVISION AREDS 2 PO), , Disp: , Rfl:      polyethylene glycol-propylene glycol (SYSTANE ULTRA) 0.4-0.3 % SOLN ophthalmic solution, Place 1 drop into  "both eyes every hour as needed for dry eyes, Disp: , Rfl:               Physical Exam:     /71 (BP Location: Right arm, Patient Position: Sitting)   Pulse 57   Ht 1.702 m (5' 7\")   Wt 144.7 kg (319 lb)   LMP  (LMP Unknown)   BMI 49.96 kg/m     Awake, alert, mild word finding difficulty at times, no dysarthria  Oriented x3, attention is fine  Cranial nerves II - XII tested, it does look like there is mild restriction of medial rectus, right worse than left, no nystagmus  There is no focal or generalized weakness in the extremities  Finger nose finger testing is normal  Rapid alternating movements are normal on both sides  Tone is normal on both sides  Sensation testing shows markedly diminished vibration sensation in the feet, pinprick is intact  Gait is a little wide-based.  She does better walking on toes and heels.  On Romberg test she sways but does not fall.      Richardson Choudhury MD        "

## 2021-05-11 NOTE — NURSING NOTE
"Chief Complaint   Patient presents with     Balance/ Vestibular     imbalance, \"jiggly\" eyes, left sided ptosis     Marifer Ferreira, BUTCH on 5/11/2021 at 7:59 AM    "

## 2021-05-11 NOTE — LETTER
5/11/2021         RE: Concepción De  [address]  [facility] [address]        Dear Colleague,    Thank you for referring your patient, Concepción De, to the [facility] [place]. Please see a copy of my visit note below.    [facility] Neurology  [place]    Concepción De MRN# [id number]   Age: 65 year old YOB: 1955               Assessment and Plan:   Assessment:   Primary progressive aphasia  Some convergence insufficiency versus slight medial rectus palsy.  She is already had MRI of the brain and myasthenia antibodies checked.  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  Imbalance-she does have some loss of sensation in the feet but reflexes are good (myelopathy versus neuropathy).        Plan:   Orders Placed This Encounter   Procedures     MR Brain w/o & w Contrast     CK total     Vitamin B6 ([facility])     Vitamin B1 whole blood     Protein Immunofixation Serum     Methylmalonic Acid     NEUROPSYCHOLOGY REFERRAL     I have ordered further labs looking for issues that might cause neuropathy.  I would like to repeat the brain MRI now with contrast looking for any sign of inflammation at the midbrain.    We delayed her repeat neuropsych testing due to the coronavirus pandemic, we will go ahead and put in the referral for that now.             Chief Complaint/HPI:     I saw Concepción for follow-up today here in our [place] office.  She was instructed to follow-up with us from by her ophthalmologist after an eye exam last month showed some exophoria bilaterally.  The patient had also mentioned that when looking up the left lid might not want to open right away.  It was recommended that acetylcholine receptor antibodies be checked.  That was actually done by her primary care physician, results came back normal (binding antibodies, blocking antibodies, modulating antibodies, striational antibodies, musk antibodies).  She  complains the right eyeball jiggles (eyeball, not eyelid).  She sometimes chokes when drinking water, sometimes chokes on her normal secretions.  She has developed some shortness of breath with exertion.  She does mention double vision when looking at things closer in, further way is fine.    No significant new issues in terms of aphasia or memory issues.            Past Medical History:    has a past medical history of Depressive disorder and Pre-diabetes.          Past Surgical History:    has a past surgical history that includes GYN surgery; ENT surgery; GI surgery; and appendectomy.          Social History:     Social History     Tobacco Use     Smoking status: Never Smoker     Smokeless tobacco: Never Used   Substance Use Topics     Alcohol use: Not Currently             Family History:     Family History   Problem Relation Age of Onset     Cancer Mother      Cancer Father      Cirrhosis Father                 Allergies:     Allergies   Allergen Reactions     Clozapine Anaphylaxis     Ciprofloxacin Hives and Itching     Nitrofurantoin Other (See Comments) and Unknown     Flu per Patient  Flu per Patient  Flu per Patient       Pantoprazole Other (See Comments)     Extremity swelling, dry mouth     Sulfa Drugs      Sulfasalazine      Other reaction(s): *Unknown             Medications:     Current Outpatient Medications:      ARIPiprazole (ABILIFY) 5 MG tablet, Take 7.5 mg by mouth daily, Disp: , Rfl:      aspirin 81 MG EC tablet, Take 81 mg by mouth daily, Disp: , Rfl:      atenolol (TENORMIN) 25 MG tablet, Take 25 mg by mouth daily, Disp: , Rfl:      clonazePAM (KLONOPIN) 0.5 MG tablet, Take 0.5 mg by mouth 2 times daily as needed, Disp: , Rfl:      escitalopram (LEXAPRO) 20 MG tablet, Take 20 mg by mouth daily, Disp: , Rfl:      famotidine (PEPCID) 20 MG tablet, Take 20 mg by mouth, Disp: , Rfl:      furosemide (LASIX) 20 MG tablet, TAKE 1/2 TO 1 TABLET BY MOUTH DAILY AS NEEDED FOR LOWER EXTREMITY SWELLING,  "Disp: , Rfl:      metFORMIN (GLUCOPHAGE) 500 MG tablet, Take 500 mg by mouth daily, Disp: , Rfl:      Multiple Vitamins-Minerals (PRESERVISION AREDS 2 PO), , Disp: , Rfl:      polyethylene glycol-propylene glycol (SYSTANE ULTRA) 0.4-0.3 % SOLN ophthalmic solution, Place 1 drop into both eyes every hour as needed for dry eyes, Disp: , Rfl:               Physical Exam:     /71 (BP Location: Right arm, Patient Position: Sitting)   Pulse 57   Ht 1.702 m (5' 7\")   Wt 144.7 kg (319 lb)   LMP  (LMP Unknown)   BMI 49.96 kg/m     Awake, alert, mild word finding difficulty at times, no dysarthria  Oriented x3, attention is fine  Cranial nerves II - XII tested, it does look like there is mild restriction of medial rectus, right worse than left, no nystagmus  There is no focal or generalized weakness in the extremities  Finger nose finger testing is normal  Rapid alternating movements are normal on both sides  Tone is normal on both sides  Sensation testing shows markedly diminished vibration sensation in the feet, pinprick is intact  Gait is a little wide-based.  She does better walking on toes and heels.  On Romberg test she sways but does not fall.      Richardson Choudhury MD            Again, thank you for allowing me to participate in the care of your patient.        Sincerely,        Richardson Choudhury MD    "

## 2021-05-12 ENCOUNTER — RECORDS - HEALTHEAST (OUTPATIENT)
Dept: ADMINISTRATIVE | Facility: OTHER | Age: 66
End: 2021-05-12

## 2021-05-12 ENCOUNTER — AMBULATORY - HEALTHEAST (OUTPATIENT)
Dept: LAB | Facility: HOSPITAL | Age: 66
End: 2021-05-12

## 2021-05-12 DIAGNOSIS — R06.02 SHORTNESS OF BREATH: ICD-10-CM

## 2021-05-12 DIAGNOSIS — R20.8 BURNING SENSATION: ICD-10-CM

## 2021-05-12 DIAGNOSIS — H53.2 DIPLOPIA: ICD-10-CM

## 2021-05-12 DIAGNOSIS — F02.80 OTHER FRONTOTEMPORAL DEMENTIA: ICD-10-CM

## 2021-05-12 DIAGNOSIS — G31.09 OTHER FRONTOTEMPORAL DEMENTIA: ICD-10-CM

## 2021-05-12 LAB — CK SERPL-CCNC: 37 U/L (ref 30–190)

## 2021-05-14 LAB
PATH ICD:: NORMAL
PROT PATTERN SERPL IFE-IMP: NORMAL
REVIEWING PATHOLOGIST: NORMAL

## 2021-05-15 LAB
METHYLMALONATE SERPL-SCNC: 0.19 UMOL/L (ref 0–0.4)
PYRIDOXAL PHOS SERPL-SCNC: 13 NMOL/L (ref 20–125)
VIT B1 PYROPHOSHATE BLD-SCNC: 96 NMOL/L (ref 70–180)

## 2021-05-20 ENCOUNTER — HOSPITAL ENCOUNTER (OUTPATIENT)
Dept: MRI IMAGING | Facility: HOSPITAL | Age: 66
Discharge: HOME | End: 2021-05-20
Attending: PSYCHIATRY & NEUROLOGY
Payer: COMMERCIAL

## 2021-05-20 DIAGNOSIS — G31.09 FRONTO-TEMPORAL DEMENTIA (H): ICD-10-CM

## 2021-05-20 DIAGNOSIS — H53.2 DIPLOPIA: ICD-10-CM

## 2021-05-20 DIAGNOSIS — R06.02 SHORTNESS OF BREATH: ICD-10-CM

## 2021-05-20 DIAGNOSIS — R20.0 NUMBNESS OF LOWER EXTREMITY: ICD-10-CM

## 2021-05-20 DIAGNOSIS — F02.80 FRONTO-TEMPORAL DEMENTIA (H): ICD-10-CM

## 2021-05-21 ENCOUNTER — HOSPITAL ENCOUNTER (OUTPATIENT)
Dept: MRI IMAGING | Facility: HOSPITAL | Age: 66
Discharge: HOME | End: 2021-05-21
Attending: PSYCHIATRY & NEUROLOGY
Payer: COMMERCIAL

## 2021-05-26 ENCOUNTER — TELEPHONE (OUTPATIENT)
Dept: NEUROLOGY | Facility: CLINIC | Age: 66
End: 2021-05-26

## 2021-05-26 ASSESSMENT — PATIENT HEALTH QUESTIONNAIRE - PHQ9
SUM OF ALL RESPONSES TO PHQ QUESTIONS 1-9: 10
SUM OF ALL RESPONSES TO PHQ QUESTIONS 1-9: 14

## 2021-05-26 NOTE — TELEPHONE ENCOUNTER
See patient message below- any information we can provide to her prior to her June 1 appt or OK to wait?  Marifer Ferreira, CMA on 5/26/2021 at 4:20 PM

## 2021-05-26 NOTE — TELEPHONE ENCOUNTER
Component      Latest Ref Rng & Units 5/12/2021   Immunofixation Electrophoresis, Serum       No monoclonal component identified.   Path ICD:       H53.2   Interpreted By:       Franki Hannon MD   Vitamin B6 (Pyridoxal 5-Phosphate)      20.0 - 125.0 nmol/L 13.0 (L)   CK, Total      30 - 190 U/L 37   Vitamin B1, Whole Blood      70 - 180 nmol/L 96   MMA Serum/Plasma, Vitamin B12 Status      0.00 - 0.40 umol/L 0.19

## 2021-05-26 NOTE — TELEPHONE ENCOUNTER
Study Result    EXAM: MR BRAIN W WO CONTRAST  LOCATION: [facility]  DATE/TIME: 5/21/2021 9:33 AM     INDICATION: Other frontotemporal dementia. Numbness of lower extremity. Diplopia.  COMPARISON: Brain MRI 03/09/2021  CONTRAST: 10 mL of gadobutrol intravenous contrast.  TECHNIQUE: Routine multiplanar multisequence head MRI without and with intravenous contrast.     FINDINGS:  There is no restricted diffusion. Paranasal sinuses are free from significant disease. Mastoid air cells appear free from significant disease. Intraorbital contents are unremarkable. There is mild generalized prominence of the sulci and ventricles,   consistent with underlying volume loss. Intracranial flow voids are intact. There is no mass effect, midline shift, or extraaxial collection. A few foci of nonspecific T2/FLAIR hyperintensity within the white matter probably reflect minor changes of   chronic small vessel ischemic injury and are within the range of expected for a patient of this age. No evidence for acute or chronic intracranial blood products.     IMPRESSION:   1.  No acute intracranial finding. No evidence for recent ischemia, intracranial hemorrhage, or mass.  2.  Mild volume loss and presumed chronic small vessel ischemic changes in the white matter.

## 2021-05-26 NOTE — TELEPHONE ENCOUNTER
Pt called for results of labs and MRI done at [facility]. She is most interested in the MRI. Anything to tell her before Dr HERBERT returns on the 1st of June? [phone]  Ok to lm

## 2021-05-27 ENCOUNTER — RECORDS - HEALTHEAST (OUTPATIENT)
Dept: ADMINISTRATIVE | Facility: CLINIC | Age: 66
End: 2021-05-27

## 2021-05-28 ENCOUNTER — RECORDS - HEALTHEAST (OUTPATIENT)
Dept: ADMINISTRATIVE | Facility: CLINIC | Age: 66
End: 2021-05-28

## 2021-05-28 NOTE — TELEPHONE ENCOUNTER
Dr. Mercado- Dr Choudhury out until June 1st and Concepción would like results. Is there anything I can tell her? See low vitamin b6  (I misinterpreted the 1st message)  Marifer Ferreira CMA on 5/28/2021 at 10:29 AM

## 2021-05-28 NOTE — TELEPHONE ENCOUNTER
Patient informed- she is scheduled for follow up 10/1/21 to discuss neuropsychology results  Marifer Ferreira CMA on 5/28/2021 at 12:51 PM

## 2021-05-28 NOTE — PROGRESS NOTES
OFFICE VISIT NOTE    Subjective:   Chief Complaint:  Heartburn (heartburn has flared up. throat burns and stomach upset)    63-year-old woman with history of morbid obesity, chronic depression, in today with a complaint of having episodes of heartburn with belching and bloating.  No dysphasia.  No melena.  No.  She does not drink alcohol.  She does not use tobacco or drink excessive amounts of caffeine.  No complaints of choking or recurrent pneumonias    Current Outpatient Medications   Medication Sig     ARIPiprazole (ABILIFY) 5 MG tablet Take 7.5 mg by mouth daily.      aspirin 325 MG tablet Take 325 mg by mouth daily.     atenolol (TENORMIN) 25 MG tablet Take 12.5 mg by mouth daily.      clobetasol (TEMOVATE) 0.05 % external solution      clonazePAM (KLONOPIN) 0.5 MG tablet Take 0.5 mg by mouth 2 (two) times a day as needed.     escitalopram oxalate (LEXAPRO) 20 MG tablet Take 10 mg by mouth daily.      flecainide (TAMBOCOR) 50 MG tablet Take 50 mg by mouth 2 (two) times a day.     FOLIC ACID/MULTIVIT-MIN/LUTEIN (CENTRUM SILVER ORAL) Take by mouth.     ivermectin (SOOLANTRA) 1 % Crea      ketoconazole (NIZORAL) 2 % shampoo Apply topically 2 (two) times a week. Apply to damp skin, lather, leave on 5 minutes, and rinse     lansoprazole (PREVACID) 30 MG capsule Take 15 mg by mouth daily.      meloxicam (MOBIC) 15 MG tablet TAKE 1 TABLET BY MOUTH EVERY DAY (Patient taking differently: prn)     metFORMIN (GLUCOPHAGE XR) 500 MG 24 hr tablet Take 1 tablet (500 mg total) by mouth daily with supper. (Patient taking differently: Take 250 mg by mouth 2 (two) times a day. )     peg 400-propylene glycol (SYSTANE) 0.4-0.3 % Drop Administer 1 drop to both eyes 4 (four) times a day as needed.     esomeprazole (NEXIUM) 40 MG capsule Take 1 capsule (40 mg total) by mouth daily.       PSFHx: Tobacco Status:  She  reports that she has never smoked. She has never used smokeless tobacco.    Review of Systems:  A comprehensive review  "of systems is negative except for the comments above    Objective:    Pulse 63   Ht 5' 7.25\" (1.708 m)   Wt (!) 307 lb (139.3 kg)   SpO2 98%   BMI 47.73 kg/m    GENERAL: No acute distress.  Weight is up another 2 pounds.  She is morbidly wheeze.  Blood pressure is 122/84.  No jaundice.  Heart shows a regular rhythm without murmur gallop or rub.  Lungs are clear.  Abdomen is obese.  There is slight tenderness in the epigastric region.  No palpable masses.  No organomegaly.  Rebound tenderness.  No flank tenderness.    Assessment & Plan   Concepción De is a 63 y.o. female.    Symptoms most compatible with reflux and some esophagitis.  Going to place her on Nexium 40 mg p.o. daily.  She knows she needs to lose some weight.  She is also on antidepressants and she should continue to follow her psychiatrist on a regular basis.    Diagnoses and all orders for this visit:    Screening for malignant neoplasm of colon  -     Ambulatory referral for Colonoscopy    GERD with esophagitis  -     esomeprazole (NEXIUM) 40 MG capsule; Take 1 capsule (40 mg total) by mouth daily.  Dispense: 30 capsule; Refill: 1    Morbid obesity (H)    Major depression in complete remission (H)        The following high BMI interventions were performed this visit: encouragement to exercise    Randall Lora MD  Transcription using voice recognition software, may contain typographical errors.    "

## 2021-05-28 NOTE — TELEPHONE ENCOUNTER
MRI of the brain shows age-related changes.  Lab work includes normal immunofixation, CK, vitamin B1 and methylmalonic acid level.  Vitamin B6 level was low.  Start over-the-counter B complex tablets 1 daily.  Further recommendation per Dr. Choudhury

## 2021-05-29 NOTE — TELEPHONE ENCOUNTER
Unless there is fever and/or vomiting, probably does not need to be seen in the emergency room.  She should try some over-the-counter antacids such as Maalox and see if she gets relief.  If not getting any relief, she should make an appointment to see me.

## 2021-05-29 NOTE — TELEPHONE ENCOUNTER
"Triage call:   Patient seen in the office 5/13/19 for Heart burn and pain- started on Nexium, heart burn went away. Patient continues to have pain in her upper abdomen- right and left side under her ribs. Patient reports that the pain seems to get worse when she eats and reports she just had lunch. Gradual onset of pain rating 4-5/10. Denies any additional symptoms. Current episode has been present longer than 10 minutes.     Triaged to be seen in the ED- patient requesting PCP advice at this time. Is ER necessary for patient or is this something that she can be seen in the office for? PCP please advise.       *Ok to leave a detailed message upon call back [phone] (home)     Pharmacy and allergies verified.     Radha Mccartney RN BA [facility] Triage/Med Refill 5/21/2019 3:37 PM    Reason for Disposition    Pain lasting > 10 minutes and over 50 years old    Answer Assessment - Initial Assessment Questions  1. LOCATION: \"Where does it hurt?\"       Right and left side   2. RADIATION: \"Does the pain shoot anywhere else?\" (e.g., chest, back)      Stays in place  3. ONSET: \"When did the pain begin?\" (e.g., minutes, hours or days ago)       For a few weeks- saw PCP- heart burn  4. SUDDEN: \"Gradual or sudden onset?\"       gradual  5. PATTERN \"Does the pain come and go, or is it constant?\"     - If constant: \"Is it getting better, staying the same, or worsening?\"       (Note: Constant means the pain never goes away completely; most serious pain is constant and it progresses)      - If intermittent: \"How long does it last?\" \"Do you have pain now?\"      (Note: Intermittent means the pain goes away completely between bouts)      Comes and goes   6. SEVERITY: \"How bad is the pain?\"  (e.g., Scale 1-10; mild, moderate, or severe)     - MILD (1-3): doesn't interfere with normal activities, abdomen soft and not tender to touch      - MODERATE (4-7): interferes with normal activities or awakens from sleep, tender to touch " "     - SEVERE (8-10): excruciating pain, doubled over, unable to do any normal activities        Currently rating pain 4-5/10   7. RECURRENT SYMPTOM: \"Have you ever had this type of abdominal pain before?\" If so, ask: \"When was the last time?\" and \"What happened that time?\"       Has had in the past couple weeks  8. AGGRAVATING FACTORS: \"Does anything seem to cause this pain?\" (e.g., foods, stress, alcohol)      Food makes worse- and certain movements   9. CARDIAC SYMPTOMS: \"Do you have any of the following symptoms: chest pain, difficulty breathing, sweating, nausea?\"      no  10. OTHER SYMPTOMS: \"Do you have any other symptoms?\" (e.g., fever, vomiting, diarrhea)        No   11. PREGNANCY: \"Is there any chance you are pregnant?\" \"When was your last menstrual period?\"        n/a    Protocols used: ABDOMINAL PAIN - UPPER-A-OH      "

## 2021-05-29 NOTE — TELEPHONE ENCOUNTER
Spoke with the patient and relayed message below from Dr. Lora.  She verbalized understanding and had no further questions at this time.  Josephine CARDOZA, BUTCH/CMT....................3:53 PM

## 2021-05-30 ENCOUNTER — RECORDS - HEALTHEAST (OUTPATIENT)
Dept: ADMINISTRATIVE | Facility: CLINIC | Age: 66
End: 2021-05-30

## 2021-05-30 VITALS — HEIGHT: 67 IN | WEIGHT: 293 LBS | BODY MASS INDEX: 45.99 KG/M2

## 2021-05-31 VITALS — HEIGHT: 67 IN | WEIGHT: 293 LBS | BODY MASS INDEX: 45.99 KG/M2

## 2021-05-31 VITALS — BODY MASS INDEX: 45.99 KG/M2 | HEIGHT: 67 IN | WEIGHT: 293 LBS

## 2021-05-31 VITALS — HEIGHT: 67 IN | BODY MASS INDEX: 45.99 KG/M2 | WEIGHT: 293 LBS

## 2021-05-31 VITALS — WEIGHT: 293 LBS | HEIGHT: 67 IN | BODY MASS INDEX: 45.99 KG/M2

## 2021-05-31 NOTE — PROGRESS NOTES
OFFICE VISIT NOTE    Subjective:   Chief Complaint:  Jaw Pain    64-year-old woman is in today with a complaint of recurrent swelling on the right side of her face.  She is been seen by a dentist, oral surgeon, and ENT.  Recent CT scan was done which showed no evidence of parotid gland tumor, stone, inflammation etc.  Her symptoms disappeared but the last couple days she knows of swollen tender area just below the right mandible.  No fevers or chills    Current Outpatient Medications   Medication Sig     ARIPiprazole (ABILIFY) 5 MG tablet Take 7.5 mg by mouth daily.      aspirin 325 MG tablet Take 325 mg by mouth daily.     atenolol (TENORMIN) 25 MG tablet Take 12.5 mg by mouth daily.      clobetasol (TEMOVATE) 0.05 % external solution      clonazePAM (KLONOPIN) 0.5 MG tablet Take 0.5 mg by mouth 2 (two) times a day as needed.     escitalopram oxalate (LEXAPRO) 20 MG tablet Take 10 mg by mouth daily.      esomeprazole (NEXIUM) 40 MG capsule Take 1 capsule (40 mg total) by mouth daily.     flecainide (TAMBOCOR) 50 MG tablet Take 50 mg by mouth 2 (two) times a day.     FOLIC ACID/MULTIVIT-MIN/LUTEIN (CENTRUM SILVER ORAL) Take by mouth.     ivermectin (SOOLANTRA) 1 % Crea      ketoconazole (NIZORAL) 2 % shampoo Apply topically 2 (two) times a week. Apply to damp skin, lather, leave on 5 minutes, and rinse     lansoprazole (PREVACID) 30 MG capsule Take 15 mg by mouth daily.      meloxicam (MOBIC) 15 MG tablet TAKE 1 TABLET BY MOUTH EVERY DAY (Patient taking differently: prn)     metFORMIN (GLUCOPHAGE XR) 500 MG 24 hr tablet Take 1 tablet (500 mg total) by mouth daily with supper. (Patient taking differently: Take 250 mg by mouth 2 (two) times a day. )     peg 400-propylene glycol (SYSTANE) 0.4-0.3 % Drop Administer 1 drop to both eyes 4 (four) times a day as needed.     cephalexin (KEFLEX) 500 MG capsule Take 1 capsule (500 mg total) by mouth 4 (four) times a day for 10 days.       Review of Systems:  A comprehensive  "review of systems is negative except for the comments above    Objective:    Pulse (!) 57   Temp 98.8  F (37.1  C) (Oral)   Ht 5' 7.25\" (1.708 m)   Wt (!) 307 lb (139.3 kg)   SpO2 94%   BMI 47.73 kg/m    GENERAL: No acute distress.  Is afebrile.  She does have palpable and visible swelling which seems to be part of the submandibular gland on the right side.  The tender spots just below the ramus of the mandible.  There is no fluctuance.  There is nothing inside the mouth.  There is no purulence.  There is no evidence of any dental abscess.    Assessment & Plan   Concepción De is a 64 y.o. female.    Sounds like a salivary gland infection of the right side.  Recommend cool packs to reduce any swelling.  Cephalexin 500 mg 4 times daily.  We will see if she improves.    Diagnoses and all orders for this visit:    Salivary gland infection  -     HM1(CBC and Differential)  -     C-Reactive Protein  -     HM1 (CBC with Diff)  -     cephalexin (KEFLEX) 500 MG capsule; Take 1 capsule (500 mg total) by mouth 4 (four) times a day for 10 days.  Dispense: 40 capsule; Refill: 0        Randall Lora MD  Transcription using voice recognition software, may contain typographical errors.    "

## 2021-06-01 VITALS — WEIGHT: 293 LBS | BODY MASS INDEX: 45.99 KG/M2 | HEIGHT: 67 IN

## 2021-06-01 VITALS — HEIGHT: 67 IN | BODY MASS INDEX: 45.99 KG/M2 | WEIGHT: 293 LBS

## 2021-06-01 NOTE — TELEPHONE ENCOUNTER
Spoke with the patient and relayed message below from Dr. Lora.  Patient verbalized understanding and had no further questions at this time.  Josephine CARDOZA, BUTCH/CMT....................4:47 PM

## 2021-06-01 NOTE — TELEPHONE ENCOUNTER
Change the dosage to cephalexin 500 mg 3 times daily.  Drink plenty of liquids to ensure there is no dehydration.  If diarrhea should persist, the medicine will need to be discontinued.

## 2021-06-01 NOTE — TELEPHONE ENCOUNTER
Medication Question or Clarification  Who is calling: Patient  What medication are you calling about? (include dose and sig)   cephalexin (KEFLEX) 500 MG capsule 40 capsule 0 9/3/2019 9/13/2019    Sig - Route: Take 1 capsule (500 mg total) by mouth 4 (four) times a day for 10 days. - Oral      Who prescribed the medication?: Randall Lora MD  What is your question/concern?: Patient states that Cephalexin is not helping with her Salivary gland infection still swollen and Sore . Patient requesting for any other suggestions. Please advise.   Pharmacy: [facility] # [id number]  Okay to leave a detailed message?: No  Site CMT - Please call the pharmacy to obtain any additional needed information.

## 2021-06-01 NOTE — TELEPHONE ENCOUNTER
I would recommend she see the ear nose and throat specialist again while she has active swelling of the face.

## 2021-06-01 NOTE — TELEPHONE ENCOUNTER
Triage note:    64 year old female called with concerns about persistent pain and intermittent swelling in right jaw line despite recent treatment with Keflex.    She was seen 9/4/19 and diagnosed with salivary gland infection.  However, after completing the Keflex her symptoms are no better but not worse either.  She reports that she had chest tightness on Friday, Saturday and Sunday but none since then. She has never felt that before.  No other symptoms. She has the following questions:    Should she go back to oral surgeon or another specialist? Any other advice?    Brittani Hebert RN, [facility] Med Refill/Triage, 9/17/2019 1:35 PM      [facility]     *Ok to leave a detailed message upon call back        Reason for Disposition    [1] Follow-up call from patient regarding patient's clinical status AND [2] information NON-URGENT    Protocols used: PCP CALL - NO TRIAGE-A-

## 2021-06-01 NOTE — TELEPHONE ENCOUNTER
Spoke with the patient and relayed message below from Dr. Lora.  She verbalized understanding and had no further questions at this time.  Josephine CARDOZA, BUTCH/CMT....................8:35 AM

## 2021-06-01 NOTE — TELEPHONE ENCOUNTER
Triage call:   Seen yesterday - infection in Salivary gland and started on Keflex.     Has had 6 stools since 8 am - happened about 1 hour after she took first pill. No abdominal pain that remains after having bowel movent. Drinking adequate fluids. No blood or abnormal color to stool- no fever.    Reviewed additional care advice- increase fluids, add is salty foods, half strength Gatorade and start on a yogurt with active cultures or a probiotic. Patient verbalizes understanding.      *Ok to leave a detailed message upon call back    Pharmacy and allergies verified.     PCP please advise- is there a medication change? Additional advice for patient?    Radha Mccartney RN BSBA [facility] Triage/Med Refill 9/4/2019 2:51 PM     Reason for Disposition    Recent antibiotic therapy (i.e., within last 2 months)    Protocols used: DIARRHEA-A-OH

## 2021-06-02 VITALS — BODY MASS INDEX: 45.99 KG/M2 | WEIGHT: 293 LBS | HEIGHT: 67 IN

## 2021-06-02 VITALS — BODY MASS INDEX: 45.99 KG/M2 | HEIGHT: 67 IN | WEIGHT: 293 LBS

## 2021-06-03 VITALS
WEIGHT: 293 LBS | BODY MASS INDEX: 45.99 KG/M2 | HEIGHT: 67 IN | HEART RATE: 57 BPM | TEMPERATURE: 98.8 F | DIASTOLIC BLOOD PRESSURE: 84 MMHG | OXYGEN SATURATION: 94 % | SYSTOLIC BLOOD PRESSURE: 110 MMHG

## 2021-06-03 VITALS — WEIGHT: 293 LBS | BODY MASS INDEX: 45.99 KG/M2 | HEIGHT: 67 IN

## 2021-06-03 NOTE — TELEPHONE ENCOUNTER
Refill Approved    Rx renewed per Medication Renewal Policy. Medication was last renewed on 5/13/2019 with 1 refill.  Last office visit: 9/3/2019 with PCP Dr BOB Lora.     Nathalie Black, [facility] Triage/Med Refill 11/21/2019     Requested Prescriptions   Pending Prescriptions Disp Refills     esomeprazole (NEXIUM) 40 MG capsule [Pharmacy Med Name: ESOMEPRAZOLE MAGNESIUM 40MG DR CAPS] 30 capsule 0     Sig: TAKE 1 CAPSULE(40 MG) BY MOUTH DAILY       GI Medications Refill Protocol Passed - 11/20/2019 11:33 AM        Passed - PCP or prescribing provider visit in last 12 or next 3 months.     Last office visit with prescriber/PCP: 9/3/2019 Randall Lora MD OR same dept: 9/3/2019 Randall Lora MD OR same specialty: 9/3/2019 Randall Lora MD  Last physical: Visit date not found Last MTM visit: Visit date not found   Next visit within 3 mo: Visit date not found  Next physical within 3 mo: Visit date not found  Prescriber OR PCP: Randall Lora MD  Last diagnosis associated with med order: 1. GERD with esophagitis  - esomeprazole (NEXIUM) 40 MG capsule [Pharmacy Med Name: ESOMEPRAZOLE MAGNESIUM 40MG DR CAPS]; TAKE 1 CAPSULE(40 MG) BY MOUTH DAILY  Dispense: 30 capsule; Refill: 0    If protocol passes may refill for 12 months if within 3 months of last provider visit (or a total of 15 months).

## 2021-06-04 VITALS — WEIGHT: 293 LBS | BODY MASS INDEX: 45.99 KG/M2 | HEIGHT: 67 IN

## 2021-06-04 NOTE — PROGRESS NOTES
NEUROPSYCHOLOGICAL CONSULTATION  [facility] NEUROLOGY [facility]    NAME: Concepción De  YOB: 1955     DATE OF EVALUATION: 12/19/2019    DSM-V DIAGNOSES:   Mild Neurocognitive Disorder due to Multiple Etiologies (psychiatric condition and incipient cortical dementia)  Bipolar Disorder, by history    SUMMARY, INTERPRETATION & INTEGRATION:  Ms. Concepción De is a 64 y.o., right-handed,  female with a history of bipolar disorder and an approximate 1-2 year history of word finding difficulties who was referred by Richardson Choudhury MD for a neuropsychological evaluation to assist further with differential diagnosis and treatment planning.  A comprehensive neurocognitive evaluation was conducted and she was an engaged and cooperative participant. Optimal premorbid abilities were estimated as falling in the average range of functioning.  Within this context, Ms. De exhibited subtle fluctuations in working memory and cognitive processing speed (with mostly lower average performances).  A thorough examination of receptive and expressive language skills was conducted and her speech was fluent and non-halting.  Comprehension was fully intact, as was word finding to confrontation.  She became flustered on a measure of phonemic fluency and her performance was mildly impaired, relative to low average semantic fluency.  Visual planning, organization and constructional abilities as well as spatial perception, were fully intact. Hersey and contextual auditory verbal learning abilities were low average, with moderately impaired free recall, and limited benefit from cueing and recognition test formatting.  Visual learning was likewise low average, with average retention and low average recognition. Visual reasoning and problem solving skills were low average, with a mild tendency toward perseveration. Motor testing revealed slowing bilaterally.  She endorsed mild symptoms of  anxiety and moderate anxiety across self-report measures.    DIAGNOSTIC IMPRESSIONS:  1) Ms. De's neurocognitive profile is notable for variable (but mostly lower average) working memory and processing speed, mildly impaired phonemic verbal fluency and nonverbal reasoning and problem solving (with a mild tendency toward perseveration), and moderate deficits in verbal delayed memory abilities and limited benefit from cueing/recognition testing.      2) At this point in time, Ms. De's attentional and executive deficits are rather mild and could be accounted for by her longstanding history of bipolar disorder (with moderate depressive symptoms currently). However, she also exhibits an encoding-deficit with  moderate delayed memory impairment and did not benefit from semantic cues or recognition test formatting, which raises concern about an incipient cortical neurodegenerative process, such as Alzheimer's disease.      RECOMMENDATIONS:   1) Neurology follow-up: For continued monitoring of her cognition and functional status and a discussion of medications to slow further cognitive decline.   2) Compensatory strategies:  I will meet with the patient for feedback and will provide education about the use of compensatory strategies to optimize her functioning in daily life. We will discuss external aids and how to implement them as well as the importance of maintaining a routine that allows her to keep a tidy and organized living space.  I will also offer her the option to meet with one of our speech therapists for a brief course of compensation-focused cognitive rehabilitation.    3) Ongoing psychiatric care and regular outpatient psychotherapy are warranted  4) Wellness programming: The patient is encouraged to increase her participation in regular cardiovascular exercise to promote overall physical health, stress management and coping, and to prevent further cognitive decline.  We will discuss options  "for increasing her activity level, including water aerobics due to some of her mobility issues.  5) Neuropsychological Re-evaluation in approximately 18-24 months to assist further with differential diagnosis and treatment planning; today's results will serve as a thorough basis for future comparison.    Thank you for this referral to neuropsychology. Please do not hesitate to contact me with any questions regarding the content of this report.    -----------------------------------EXTENDED REPORT BELOW-------------------------------------    HISTORY OF PRESENT ILLNESS & REASON FOR REFERRAL:  Ms. Concepción De is a 64 y.o., right-handed,  female with a history of bipolar disorder who was referred by Richardson Choudhury MD for a neuropsychological evaluation to assist further with differential diagnosis and treatment planning.  She presented today's appointment unaccompanied and provided the details of her history, which were supplemented by a thorough review of her electronic medical record.    With regard to her cognitive symptoms, Ms. De reported an approximate 1 to 2-year history of declines in word finding and expressive fluency.  She will often struggle to find the word that she is seeking when out with friends or ordering food from a restaurant.  It seems to \"keep happening\" and she wonders if it is getting worse.  Friends often interject in an attempt to be helpful and offer her the word she is seeking. She finds it quite frustrating. She acknowledges that when she feels it coming on, she has a tendency to \"freeze\" and cannot get what she is attempting to say. She feels that the words used to \"fly out,\" but that it is now a struggle for her to articulate her thoughts.  She also describes some symptoms worrisome for apraxia of speech, including often mixing up the syllables of words.  She also endorsed word reversals but denied semantic paraphasic errors.  She was previously an avid reader " "but this has declined. In general, she does well with remembering conversations and scheduled events, but recently had an episode where she confused where her doctor's office was and was quite frightened by this event. She otherwise denied difficulties with spatial orientation or navigating.    With regard to the activities of daily living, Ms. De currently lives alone in a town home in [place], [place].  She independently manages her medications with the use of routine.  She manages her finances and personal affairs and also the finances of her mother, who is 86 years old and lives independently.  She tends to have difficulty throwing stuff away and has quite a bit of clutter in her home, but denied being a hoarder.  Friends will come over and help her throw things away and sort through her clutter.  In addition, the patient does some light cleaning on her own.  She is not much of a cook but will occasionally use the oven or stove.  She denied any issues with burning food or forgetting to turn the oven off when she is finished using it.  However, she mostly will eat out or prepare convenience foods (e.g., cereal).  She drives and denied any issue getting lost or turned around when doing so.    With regard to her mood, Ms. De reports that her spirits have been \"pretty good.\"  She was never much of a worrier until the last few years. She now worries quite a bit about her mother.  She has been exhausted from caregiving for her mother over the course of the last year; noting that she is \"not much of a do-er.\"  She enjoys watching television and playing with her cat.  She has been somewhat withdrawn from previously enjoyed activities, such as her artwork. However, she does have a number of friends that she spends times with regularly.  She stated that she has not had a severe depressive or manic episode in many years and has not been hospitalized in over 20 years for mental health difficulties. "  She currently takes Abilify and escitalopram and sees her psychologist every few weeks.  With regard to the vegetative symptoms of depression, she has somewhat fitful sleep but generally sleeps for 6 to 8 hours a night and awakens feeling rested.  She denies any known history of sleep disorder.  She is a stress eater and has gained some weight recently.  She denied any significant changes in her sense of taste or smell.  Her energy is semi-low and she will occasionally doze off in her chair for 30 minutes during the day.  She denies suicidal ideation or any changes in her personality or behavior.   She is a non-drinker and non-smoker.    DIAGNOSTIC STUDIES:  I reviewed the medical record reveals that MRI conducted on 2/1/2018 revealed no acute findings and mild age-related changes that were felt to be stable since a previous MRI in 2015.    CURRENT MEDICATIONS:    Current Outpatient Medications:      ARIPiprazole (ABILIFY) 5 MG tablet, Take 7.5 mg by mouth daily. , Disp: , Rfl:      aspirin 325 MG tablet, Take 325 mg by mouth daily., Disp: , Rfl:      atenolol (TENORMIN) 25 MG tablet, Take 12.5 mg by mouth daily. , Disp: , Rfl:      clobetasol (TEMOVATE) 0.05 % external solution, , Disp: , Rfl:      clonazePAM (KLONOPIN) 0.5 MG tablet, Take 0.5 mg by mouth 2 (two) times a day as needed., Disp: , Rfl: 1     escitalopram oxalate (LEXAPRO) 20 MG tablet, Take 10 mg by mouth daily. , Disp: , Rfl:      esomeprazole (NEXIUM) 40 MG capsule, Take 1 capsule (40 mg total) by mouth daily., Disp: 90 capsule, Rfl: 3     flecainide (TAMBOCOR) 50 MG tablet, Take 50 mg by mouth 2 (two) times a day., Disp: , Rfl:      FOLIC ACID/MULTIVIT-MIN/LUTEIN (CENTRUM SILVER ORAL), Take by mouth., Disp: , Rfl:      ivermectin (SOOLANTRA) 1 % Crea, , Disp: , Rfl:      ketoconazole (NIZORAL) 2 % shampoo, Apply topically 2 (two) times a week. Apply to damp skin, lather, leave on 5 minutes, and rinse, Disp: , Rfl:      lansoprazole (PREVACID) 30  MG capsule, Take 15 mg by mouth daily. , Disp: , Rfl:      meloxicam (MOBIC) 15 MG tablet, TAKE 1 TABLET BY MOUTH EVERY DAY (Patient taking differently: prn), Disp: 30 tablet, Rfl: 2     metFORMIN (GLUCOPHAGE XR) 500 MG 24 hr tablet, Take 1 tablet (500 mg total) by mouth daily with supper. (Patient taking differently: Take 250 mg by mouth 2 (two) times a day. ), Disp: 30 tablet, Rfl: 3     peg 400-propylene glycol (SYSTANE) 0.4-0.3 % Drop, Administer 1 drop to both eyes 4 (four) times a day as needed., Disp: , Rfl:     DEVELOPMENTAL & MEDICAL HISTORY:  Ms. Concepción De's developmental history is unremarkable.  She denied any developmental delay or complications.  She denied history of learning disability but stated that she was a shy and nervous child.  She earned B's and C's in her courses without much effort and was always artistically inclined.  She graduated from high school and earned a Bachelor's degree in art education.    Past Medical History:   Diagnosis Date     Bipolar affective (H)      GERD (gastroesophageal reflux disease)      Obesity      Paroxysmal atrial fibrillation (H)    In addition to the conditions listed above, the patient has a history of fatty liver disease, prediabetes, DVT, and a hernia.  There is a history of a head injury with loss of consciousness at the age of 15 when she struck a tree while skiing.  She was seen by a physician and diagnosed with a concussion. She reported mild post-concussive symptoms, including  subsequent headaches and a visual disturbance.  There is no history of stroke or heart attack.  The patient denied any issues with orthostasis, or changes in her gait, balance, or stability.  She denied any chronic pain.    Past Surgical History:   Procedure Laterality Date     GASTROPLASTY VERTICAL BANDED       RADICAL HYSTERECTOMY Bilateral 2003   Ms. De previously underwent a Jass-en-Y that had to be reversed (almost immediately) due to  complications.    FAMILY MEDICAL HISTORY:  Breast cancer (mother), depression with memory loss in late life (mother), atrial fibrillation (mother), Merkel cell carcinoma (father), cirrhosis (father), enlarged prostate (brother), Alzheimer's disease (maternal uncle), stroke (maternal grandfather), ovarian cancer (maternal grandmother), diabetes (paternal grandmother), dementia (paternal grand mother).    PAST PSYCHIATRIC HISTORY:  Ms. De forwarded first being diagnosed and treated for bipolar disorder at the age of 36.  In the years leading up to her diagnosis, she was hospitalized several times for depression with suicidality.  She estimates that she has been psychiatrically hospitalized 7-8 times in her life, most recently over 20 years ago.  Throughout her 30's she underwent 3 rounds of ECT, approximately 10 sessions each.  She did find the treatment to be effective for her depression. Currently, her mood has been stable for many years and she is currently taking Abilify, E citalopram and meets with a psychologist every other week.    SUBSTANCE USE HISTORY:  Ms. De denies any history of chemical dependency diagnosis, treatment, or hospitalization. She is a never smoker.    SOCIAL HISTORY:  Ms. De was born and raised in [place]. She had one brother.  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 However, she was disabled from working due to mental health difficulties at the age of 36. She has never resumed working.  Ms. De is currently single.  She has no children.    MENTAL STATUS EXAM AND BEHAVIORAL OBSERVATIONS:  Ms. De arrived on time and unaccompanied to today's appointment. The procedures were explained to the patient and she provided verbal consent. She was appropriately dressed and groomed.  She appeared alert and engaged.  Her mood was euthmyic and her affect was appropriately reactive.   Rapport  "was easily established and eye contact was unremarkable.  She was pleasant and cooperative. Rate, prosody, and content of speech were grossly normal.  Her speech was fluent and non-halting; no word finding errors were noted in casual conversation.  There was no evidence of a natalio thought disorder; no hallucinations or delusions were apparent. She was a good historian.   Judgment and insight appeared fair.   Ms. De appeared adequately motivated and engaged easily in the testing component of the evaluation.  Her speech was fluent and goal directed. She was friendly and pleasant.  She persisted with difficult tasks, but would joke about the difficulties of certain tasks. She would nonetheless exert her best effort on each item.  She self-monitored her performance and was responsive to encouragement. The following is judged to be a valid representation of her current pattern of cognitive strengths and weaknesses.    TESTS ADMINISTERED:  The test battery included: Draw-A-Clock Test, Wechsler Adult Intelligence Scale-IV (select subtests), Wide Range Achievement Test-4 (select subtests), Wechsler Memory Test-III and IV (select subtests),    [place] Verbal Learning Test-II, Trailmaking Test, Controlled Oral Word Association Test and Category Fluency, Stanley Naming Test-2,Joe-Osterrieth Complex Figure Test, Stroop Color and Word Test, [place] Card Sorting Test-1 deck, Brief Visuospatial Memory Test-Revised, BDAE Complex Ideational Material, Cookie Theft Picture and Sheridan Judgement of Line Orientation, Finger Tapping Test and Grooved Pegboard, Patient Health Questionnaire-9 and Generalized Anxiety Disorder-7 Assessment.     ESTIMATED OPTIMAL PREMORBID FUNCTIONING:  Optimal premorbid intellectual abilities were estimated as falling in the average range based on Ms. De's developmental and educational history and tasks least likely to be affected by acquired brain dysfunction (i.e., \"hold tests\").    TEST " RESULTS:  Ms. De was fully oriented.  Her performance on the draw-a-clock test was fully intact, with the exception of not drawing in all of the numbers on the clock face.     Auditory attention and working memory performances fell in the average range of functioning.  Specifically, she was able to immediately recall up to 7 digits presented auditorily, mentally reverse up to 4 digits, and numerically sequence up to 4 digits. Her  ability to solve mental arithmetic word problems presented auditorily fell in the average range.    Comprehension appeared fully intact. Her performance was fully intact on a measure that required her to listen to increasingly lengthy verbal material presented auditorily and answer a series of questions regarding the material/stories.  Her speech was fluent and articulate during the cookie theft task and throughout the testing session.  Verbal reasoning abilities were average. Confrontation naming was average. Phonemic verbal fluency fell in the mildly impaired range and she was quite anxious during the task.  Her performance on a measure of semantic verbal fluency fell in the low average range of functioning overall.  Sight-word reading and single word spelling skills were solidly average.    Cognitive speed and processing accuracy fell in the average range of functioning on a task that required her to use abstract symbols to decode a series of numbers. Speeded visual scanning and cancellation were low average. Her performance fell in the average range on a task that required her to quickly connect a series of numbers presented in a visual array on a page. Her performance was in the averagerange on a subsequent task that required mental flexibility and set-shifting to quickly alternate between sequencing letters and numbers presented on a page. Speeded word reading was severely impaired, color naming was severely impaired, and response inhibition fell in the mildly impaired  range.    Visual attention and spatial localization skills were average.  Her ability to use visual analysis and synthesis to identify a pattern and missing component of a geometric design fell in the average range. Two dimensional visuoconstruction of a geometric design was notable for a logical and organized approach and her performance fell in the average range relative to peers.  Three dimensional visuoconstruction fell in the superior range on the block design task.    With regard to learning and memory, Ms. De was also administered measure of rote auditory verbal list learning that required her to learn a series of 16 words over 5 trials and retain and recall them over a long (20 minute) delay.  Her initial rate of learning fell in the low average range of functioning (raw score recall over trials = 4, 6, 8, 8, 9).  Ms. De retained and recalled approximately 33% of the previously learned information over the brief delay and 44% of the information over a long delay (moderately impaired performance). Her performance did not improve significantly with semantic cues (2 additional words recalled). Recognition memory was low average due to a high number of false positive errors (16 total hits, 9 false positives). Contextual auditory verbal learning abilities were low average but she retained and recalled only 50% of the previously learned information over a delay (mildly impaired).  Recognition memory for the details of the story fell in the low average range.  Visual delayed memory performance was low average on a task that required her to learn a series of 6 geometric designs over three, 10-second learning trials the patient recalled ~63% of the previously learned details of the designs over the delay (average).  Visual recognition memory was low average.     Ms. De was also administered a measure of nonverbal problem solving that required her to generate and flexibly alternate between card  sorting strategies based on the feedback that she received from the examiner.  She caught on to the task quickly and achieved a solution; however, after that she struggled somewhat to use the examiner's feedback and inductive reasoning to identify a second approach.  She ultimately did so and achieved a second solution (low average problem solving); however, she was mildly perseverative on the task overall.     Motor testing was not clearly lateralizing.  She exhibited severely slowed finger tapping in her non-dominant (left) hand and moderately reduced speed in her right hand.  Speeded motor dexterity was low average bilaterally.     On the Patient Health Questionnaire-9, a self report measure of depressive symptomatology, she obtained a score of 10, placing her in the range of moderate depression.  She denied suicidal ideation.    On the Generalized Anxiety Disorder-7, a self-report measure of anxiety, she obtained a score of 6,  placing her in the range of mild anxiety.     EVALUATION SERVICES & TIME:   A clinical interview/neurobehavioral status examination was conducted with the patient and documented. I thoroughly reviewed the medical record, selected the neuropsychological test battery, provided supervision to the trained examiner/technician, interpreted/integrated patient data and test results, engaged in clinical decision making, treatment planning, report writing/preparation and provision of interactive feedback of test results on 1/3/2020. I directly administered/scored 2+ of the neuropsychological tests.  A trained examiner/technician administered and scored the remainder of the neuropsychological tests (2 + tests).  Please note, all charges are filed at the completion of the Episode of Care and associated with the final encounter date (feedback session on 1/3/2020). Please see below for a breakdown of time spent and the associated codes billed for these services. Any discrepancy between the codes listed  below, and those filed on my behalf, reflect changes made by the [facility] billing/coding department.    Services   Time Spent  CPT Codes   Neurobehavioral Status Exam:  (e.g., face-to-face, interpretation, report)   49 minutes 1 x 96116  0 x 96121   Neuropsychological Evaluation Services:   (e.g., integration, interpretation, report preparation, treatment planning, clinical decision making, feedback)   302 minutes   1 x 96132  4 x 96133   Neuropsychological Testing by Psychologist:  (e.g., test administration, scoring, 2+ tests administered)   16 minutes   1 x 96136  0 x 96137   Neuropsychological Testing by Trained Examiner/Technician:  (e.g., test administration, scoring, 2+ tests administered)   220 minutes   1 x 96138  6 x 96139     For diagnostic and coding purposes, Ms. De has a history of bipolar disorder and mild neurocognitive disorder due to multiple etiologies.    Thank you for the opportunity to assist in the evaluation and care of Ms. De.    Please do not hesitate to contact me with any questions regarding my findings or recommendations.    Tory Nation, PhD, LP, ABPP  Board Certified in Clinical Neuropsychology  Lead Neuropsychologist    [facility] [address]  Phone: [phone]

## 2021-06-04 NOTE — PROGRESS NOTES
NEUROPSYCHOLOGY FEEDBACK NOTE    NAME: Concepción De  YOB: 1955     DATE OF EVALUATION: 1/3/2020      SUMMARY OF SESSION:  Concepción De is a 64 y.o.,  female with a history of bipolar disorder and mild neurocognitive disorder due to multiple etiologies, who was referred for a cognitive evaluation by Richardson Choudhury MD.  Ms. De arrived on time and accompanied by her friend, Vikki. We began the session by discussing her experience during the neuropsychometric evaluation.  I provided Ms. De with detailed feedback regarding her performance on cognitive testing and her pattern of cognitive strengths and weaknesses.  I discussed my overall impressions and recommendations and provided the opportunity for Ms. De to ask any questions that she had about the evaluation. We discussed the fact that her learning and memory difficulties are greater than what I would expect based on her psychiatric diagnoses alone and my concern about an incipient neurodegenerative process.  Education was provided about options for compensation focused cognitive rehabilitation, an acetylcholinesterase inhibitor, as well as a referral to PT for a balance/return to exercise evaluation.  At the end of the session, she indicated that she understood the results and that I had answered all of her questions.  She was provided with my contact information, should any further questions or concerns arise in the future.    Please contact me with any questions regarding the content of this note.     Tory Nation, PhD, LP, ABPP  Board Certified in Clinical [place] [address]  Phone: [phone]    For diagnostic and coding purposes, Ms. De has a history of bipolar I disorder and Mild Neurocognitive Disorder.      As this is the final date for this Episode of Care (initiated on 12/19/2019)  all charges for the  entire Episode of Care will be filed today. Please see the 12/19/2019 evaluation for a detailed description of codes and services, including services provided today.   In brief:   1 x 96116  0 x 96121  1 x 96132  4 x 96133  1 x 96136  0 x 96137  1 x 96138  6 x 96139

## 2021-06-04 NOTE — PROGRESS NOTES
The patient was seen for a neuropsychological evaluation for the purposes of diagnostic clarification and treatment planning. 158 minutes of face-to-face testing were provided by this writer. An additional 62 minutes were spent scoring and compiling test results.The patient was cooperative with testing. No concerns were brought to my attention. Please see Dr. Nation's report for a detailed description of the charges and interpretation and integration of the findings.

## 2021-06-08 NOTE — PROGRESS NOTES
"OFFICE VISIT NOTE    Subjective:   Chief Complaint:  Nausea (and Rt rib pain X 1 month on/off)    61-year-old woman is having some spells of pain in the right rib cage right upper quadrant.  Occasional social discomfort in the back.  She also gets nausea occasionally will vomit.  Food sometimes will take the pain away.  No melena.  No hematemesis.  No jaundice fevers or chills.    Current Outpatient Prescriptions   Medication Sig     ARIPiprazole (ABILIFY) 5 MG tablet Take 2.5 mg by mouth daily.     aspirin 325 MG tablet Take 325 mg by mouth daily.     atenolol (TENORMIN) 25 MG tablet Take 25 mg by mouth daily.     clobetasol (TEMOVATE) 0.05 % external solution      escitalopram oxalate (LEXAPRO) 20 MG tablet Take 10 mg by mouth daily.      flecainide (TAMBOCOR) 50 MG tablet Take 50 mg by mouth 2 (two) times a day.     ivermectin (SOOLANTRA) 1 % Crea      ketoconazole (NIZORAL) 2 % shampoo Apply topically 2 (two) times a week. Apply to damp skin, lather, leave on 5 minutes, and rinse     lansoprazole (PREVACID) 30 MG capsule Take 15 mg by mouth daily.      LORazepam (ATIVAN) 1 MG tablet Take 1 mg by mouth bedtime.      meloxicam (MOBIC) 15 MG tablet TAKE 1 TABLET BY MOUTH EVERY DAY (Patient taking differently: prn)     metFORMIN (GLUCOPHAGE XR) 500 MG 24 hr tablet Take 1 tablet (500 mg total) by mouth daily with supper. (Patient taking differently: Take 250 mg by mouth 2 (two) times a day. )     peg 400-propylene glycol (SYSTANE) 0.4-0.3 % Drop Administer 1 drop to both eyes 4 (four) times a day as needed.       Review of Systems:  A comprehensive review of systems is negative except for the comments above    Objective:      Visit Vitals     Pulse (!) 53     Ht 5' 7.25\" (1.708 m)     Wt (!) 307 lb (139.3 kg)     SpO2 97%     BMI 47.73 kg/m2     GENERAL: No acute distress.  Hospital no distress.  No jaundice.  Blood pressure is 138/86.  Lungs are clear.  Heart shows a sinus rhythm.  The abdomen is obese without any " tenderness or organomegaly.  I do not palpate any masses.  No flank tenderness.    Assessment & Plan   Concepción De is a 61 y.o. female.    Assessment right upper quadrant pain.  Ultrasound rule out cholelithiasis.  She needs to diet and lose weight.    Diagnoses and all orders for this visit:    Right upper quadrant abdominal pain  -     US Abdomen Limited; Future; Expected date: 2/1/17    Obesity        Randall Lora MD  Transcription using voice recognition software, may contain typographical errors.

## 2021-06-08 NOTE — PROGRESS NOTES
"Concepción De is a 64 y.o. female who is being evaluated via a billable video visit.      The patient has been notified of following:     \"This video visit will be conducted via a call between you and your physician/provider. We have found that certain health care needs can be provided without the need for an in-person physical exam.  This service lets us provide the care you need with a video conversation.  If a prescription is necessary we can send it directly to your pharmacy.  If lab work is needed we can place an order for that and you can then stop by our lab to have the test done at a later time.    Video visits are billed at different rates depending on your insurance coverage. Please reach out to your insurance provider with any questions.    If during the course of the call the physician/provider feels a video visit is not appropriate, you will not be charged for this service.\"    Patient has given verbal consent to a Video visit? Yes    Patient would like to receive their AVS by AVS Preference: Mail a copy.    Patient would like the video invitation sent by: Text to cell phone: [phone]    Will anyone else be joining your video visit? No         Video Start Time: 10:05 AM    Additional provider notes: GENERAL: Healthy, alert and no distress  EYES: Eyes grossly normal to inspection. No discharge or erythema, or obvious scleral/conjunctival abnormalities.  RESP: No audible wheeze, cough, or visible cyanosis.  No visible retractions or increased work of breathing.    NEURO: Cranial nerves grossly intact. Mentation and speech appropriate for age.  PSYCH: Mentation appears normal, affect normal/bright, judgement and insight intact, normal speech and appearance well-groomed  She has gained about 5 to 10 pounds.  She is not checked her blood pressure.  Does not have a glucometer.  She does take her Metformin daily.  No cardiopulmonary symptoms.  No recent infections.  No numbness of the hands or feet.  " No infections on the skin.    Assessment  1 prediabetes.  Needs an A1c.    2 history of paroxysmal atrial fibrillation.  Maintaining sinus rhythm.  Continue atenolol.    3 morbid obesity.  Advised to lose weight.  Check BMP.  Video-Visit Details    Type of service:  Video Visit    Video End Time (time video stopped): 10:27 AM  Originating Location (pt. Location): Home    Distant Location (provider location):  [facility]     Platform used for Video Visit: [facility]      Randall Lora MD

## 2021-06-10 NOTE — PROGRESS NOTES
NEUROPSYCHOLOGY NOTE    Ms. De was referred for a neuropsychological evaluation on 8/4/2020 by neurologist, Richardson Choudhury MD. Due to the ongoing COVID-19 pandemic that prevents current in-person visits, we are currently only offering limited telehealth evaluations. Ms. De was previously evaluated in 2019, and as such, we would prefer to re-evaluate her in person so as to make direct comparisons with her previous test results without any additional confounding variables. This will also allow us to better address the referral question. We will put the patient on our wait-list and will contact her as soon as we are able to safely accommodate face-to-face appointments again. This information was relayed to the patient this morning via telephone call by our schedulers.    Should any questions or concerns arise about this plan, our service can be contacted at [phone].

## 2021-06-10 NOTE — PROGRESS NOTES
"Concepción De is a 65 y.o. female who is being evaluated via a billable telephone visit.      The patient has been notified of following:     \"This telephone visit will be conducted via a call between you and your physician/provider. We have found that certain health care needs can be provided without the need for a physical exam.  This service lets us provide the care you need with a short phone conversation.  If a prescription is necessary we can send it directly to your pharmacy.  If lab work is needed we can place an order for that and you can then stop by our lab to have the test done at a later time.    Telephone visits are billed at different rates depending on your insurance coverage. During this emergency period, for some insurers they may be billed the same as an in-person visit.  Please reach out to your insurance provider with any questions.    If during the course of the call the physician/provider feels a telephone visit is not appropriate, you will not be charged for this service.\"    Patient has given verbal consent to a Telephone visit? Yes    What phone number would you like to be contacted at? [phone]        Additional provider notes:   65-year-old woman with a history of bipolar disorder, obesity.  Recently to have difficulty with speech.  Neurology referred her to psych.  She also had an MRI of the brain.  They think she might have a early frontotemporal dementia.  She feels that she has difficulty speaking.  When she tired, she has difficulty getting words out..  Otherwise, she is quite functional  She continues have difficulty with obesity.  She does not have control of her eating and her weight is going up.  Her A1c in May was 5.9 but she has had some borderline blood sugars.  I would recheck his A1c again this October or November.  She will be repeating her neuropsych testing again in December or January.  I do not think there is been much progression.  We discussed dieting and " diabetes.  I would like to see her again in October or November.  Assessment/Plan:  Morbid obesity  2 prediabetes  3 possible frontotemporal dementia  4 history of bipolar disorder      Phone call duration:  14 minutes    Maggie Bonilla CMA

## 2021-06-10 NOTE — PROGRESS NOTES
"OFFICE VISIT NOTE    Subjective:   Chief Complaint:  Follow-up (check up, fasting labs)    [age]-year-old woman with a history of obesity as well as nonalcoholic liver disease.  She also has some bipolar disorder and is followed by psychiatry.  She seems to be doing okay.  She has not been able to lose much weight.  She is starting to diet again.  There is no polyuria or polydipsia.  No infections.  No cardiovascular symptoms.    Current Outpatient Prescriptions   Medication Sig     ARIPiprazole (ABILIFY) 5 MG tablet Take 2.5 mg by mouth daily.     aspirin 325 MG tablet Take 325 mg by mouth daily.     atenolol (TENORMIN) 25 MG tablet Take 25 mg by mouth daily.     clobetasol (TEMOVATE) 0.05 % external solution      escitalopram oxalate (LEXAPRO) 20 MG tablet Take 10 mg by mouth daily.      flecainide (TAMBOCOR) 50 MG tablet Take 50 mg by mouth 2 (two) times a day.     FOLIC ACID/MULTIVIT-MIN/LUTEIN (CENTRUM SILVER ORAL) Take by mouth.     ivermectin (SOOLANTRA) 1 % Crea      ketoconazole (NIZORAL) 2 % shampoo Apply topically 2 (two) times a week. Apply to damp skin, lather, leave on 5 minutes, and rinse     lansoprazole (PREVACID) 30 MG capsule Take 15 mg by mouth daily.      LORazepam (ATIVAN) 1 MG tablet Take 1 mg by mouth bedtime.      meloxicam (MOBIC) 15 MG tablet TAKE 1 TABLET BY MOUTH EVERY DAY (Patient taking differently: prn)     metFORMIN (GLUCOPHAGE XR) 500 MG 24 hr tablet Take 1 tablet (500 mg total) by mouth daily with supper. (Patient taking differently: Take 250 mg by mouth 2 (two) times a day. )     peg 400-propylene glycol (SYSTANE) 0.4-0.3 % Drop Administer 1 drop to both eyes 4 (four) times a day as needed.       PSFHx: Tobacco Status:  She  reports that she has never smoked. She does not have any smokeless tobacco history on file.    Review of Systems:  A comprehensive review of systems is negative except for the comments above    Objective:    Pulse 63  Ht 5' 7.25\" (1.708 m)  Wt (!) 306 lb (138.8 " kg)  SpO2 95%  BMI 47.57 kg/m2  GENERAL: No acute distress.  Blood pressures 1/22/1982.  Weight is the same as last time.  She weighs over 300 pounds.  Carotid arteries are normal.  Lungs are clear.  Heart shows a sinus rhythm without murmur gallop or rub.  The abdomen is quite obese.  There is no tenderness.  There is no organomegaly or palpable masses.  No peripheral edema.  No jaundice.  Neurologic exam is normal.  Joints look okay for her age.    Assessment & Plan   Concepción De is a 61 y.o. female.    Clinically she is stable.  She is going to try to lose some more weight.  We will check her lipid profile as well as chemistry survey to see what liver function tests are doing.  Medicines for now remain the same.  Return to clinic in about 4 months.  I have asked her to lose about a pound a week if she can.    Diagnoses and all orders for this visit:    Obesity  -     Lipid Cascade    Nonalcoholic fatty liver disease  -     Comprehensive Metabolic Panel        The following high BMI interventions were performed this visit: encouragement to exercise    Randall Lora MD  Transcription using voice recognition software, may contain typographical errors.

## 2021-06-11 NOTE — PROGRESS NOTES
"Concepción De is a 65 y.o. female who is being evaluated via a billable telephone visit.      The patient has been notified of following:     \"This telephone visit will be conducted via a call between you and your physician/provider. We have found that certain health care needs can be provided without the need for a physical exam.  This service lets us provide the care you need with a short phone conversation.  If a prescription is necessary we can send it directly to your pharmacy.  If lab work is needed we can place an order for that and you can then stop by our lab to have the test done at a later time.    Telephone visits are billed at different rates depending on your insurance coverage. During this emergency period, for some insurers they may be billed the same as an in-person visit.  Please reach out to your insurance provider with any questions.    If during the course of the call the physician/provider feels a telephone visit is not appropriate, you will not be charged for this service.\"    Patient has given verbal consent to a Telephone visit? Yes    What phone number would you like to be contacted at? [phone]    Patient would like to receive their AVS by AVS Preference: Mail a copy.    Additional provider notes: Telephone call was made to this patient today.  She is under a lot of stress because her mother, who has early onset dementia and is in her 80s, he is now in assisted living.  She recently was at home fell and fractured her wrist and ribs.  Surgery was required on her wrist and she is now in the assisted living.  She wants to come home.  Daughter courses very upset and nervous about her coming home.  Concepción continues to take her medications prescribed by psychiatry.  This is includes Abilify and Klonopin.  She also complains of some discomfort in the left axillary region.  She thinks her might be a lump there.  She is not check to see there is any redness.  Mild tenderness only.  No " drainage.    Assessment/Plan:  History of frontotemporal dementia this is stable.  Follow-up with neurology  2 depression.  Continue her same medications.  Hopefully, the situation with her mother will stabilize and she will improve.  3 left axillary pain.  Warm packs to the area twice daily for 20 minutes.  If symptoms persist, she will need to come in the office for an appointment.      Phone call duration:  15minutes    Alona Quiros CMA

## 2021-06-11 NOTE — TELEPHONE ENCOUNTER
Message sent to pcp and they will call her with instructions.  Marleny Shipley RN [facility] Triage Nurse Advisor

## 2021-06-11 NOTE — TELEPHONE ENCOUNTER
Patient notified of below message and stated she will go to [facility]. Maggie Bonilla, BUTCH

## 2021-06-11 NOTE — TELEPHONE ENCOUNTER
Ordered take out and thinks it was bad.  Stomach cramps.  Bloody stools.    No hemorrhoids.    Cramping.    Like jelly. Big clots. Past 90 minutes.pooping just blood and not stool.    Not a lot of stool but blood continues to come out like jelly.      Do you want to order a scan for her? Or send her to ED?    Marleny Shipley RN [facility] Triage Nurse Advisor    Reason for Disposition    SEVERE rectal bleeding (large blood clots; on and off, or constant bleeding)    Additional Information    Negative: Passed out (i.e., fainted, collapsed and was not responding)    Negative: Shock suspected (e.g., cold/pale/clammy skin, too weak to stand, low BP, rapid pulse)    Negative: Vomiting red blood or black (coffee ground) material    Negative: Sounds like a life-threatening emergency to the triager    Negative: Diarrhea is the main symptom    Negative: Rectal symptoms    Protocols used: RECTAL BLEEDING-A-[place]

## 2021-06-12 NOTE — PROGRESS NOTES
"OFFICE VISIT NOTE    Subjective:   Chief Complaint:  Follow-up (chong by belly button, discuss weightloss clinic at [place], was in urgency room for edema records in Care Everywhere)    82-year-old woman with a history of morbid obesity.  She also has nonalcoholic fatty liver disease.  She is concerned that she might be developing a small hernia on the anterior abdominal wall.  No cramps nausea or vomiting.    Current Outpatient Prescriptions   Medication Sig     ARIPiprazole (ABILIFY) 5 MG tablet Take 2.5 mg by mouth daily.     aspirin 325 MG tablet Take 325 mg by mouth daily.     atenolol (TENORMIN) 25 MG tablet Take 25 mg by mouth daily.     clobetasol (TEMOVATE) 0.05 % external solution      escitalopram oxalate (LEXAPRO) 20 MG tablet Take 10 mg by mouth daily.      flecainide (TAMBOCOR) 50 MG tablet Take 50 mg by mouth 2 (two) times a day.     FOLIC ACID/MULTIVIT-MIN/LUTEIN (CENTRUM SILVER ORAL) Take by mouth.     ivermectin (SOOLANTRA) 1 % Crea      ketoconazole (NIZORAL) 2 % shampoo Apply topically 2 (two) times a week. Apply to damp skin, lather, leave on 5 minutes, and rinse     lansoprazole (PREVACID) 30 MG capsule Take 15 mg by mouth daily.      LORazepam (ATIVAN) 1 MG tablet Take 1 mg by mouth bedtime.      meloxicam (MOBIC) 15 MG tablet TAKE 1 TABLET BY MOUTH EVERY DAY (Patient taking differently: prn)     metFORMIN (GLUCOPHAGE XR) 500 MG 24 hr tablet Take 1 tablet (500 mg total) by mouth daily with supper. (Patient taking differently: Take 250 mg by mouth 2 (two) times a day. )     peg 400-propylene glycol (SYSTANE) 0.4-0.3 % Drop Administer 1 drop to both eyes 4 (four) times a day as needed.       Review of Systems:  A comprehensive review of systems is negative except for the comments above    Objective:    Pulse (!) 57  Ht 5' 7.25\" (1.708 m)  Wt (!) 309 lb (140.2 kg)  SpO2 98%  BMI 48.04 kg/m2  GENERAL: No acute distress.  Unfortunately, her weight is up another 3 pounds.  She has morbid " obesity.  Blood pressure is 126/82.  The abdomen is obese.  There is a vertical midline scar from previous gastroplasty.  I do not see or palpate a hernia in the abdominal wall.  Heart shows a regular rhythm.  There is no edema today.    Assessment & Plan   Concepción De is a 62 y.o. female.    Obesity  History of nonalcoholic fatty liver disease.  No evidence of hernia on exam today.    I have asked her to enroll in a diet clinic-plan.  She needs to start losing some weight.  She will do this.  I would like to see her again in October.  By that time I would hope she is at least 10-12 pounds lighter.    Diagnoses and all orders for this visit:    Nonalcoholic fatty liver disease    Obesity        Randall Lora MD  Transcription using voice recognition software, may contain typographical errors.

## 2021-06-13 NOTE — PROGRESS NOTES
OFFICE VISIT NOTE    Subjective:   Chief Complaint:  Follow-up (started Rexulti prescribed by Dr. Coates and started having numbness, tingling, and pain in feet and legs. did stop medication and a little improvement. having joint pain.)    62-year-old woman with a history of mental health issues.  Recently she was placed on a new antipsychotic.  About a week later she began complaining of burning sensation in her feet up to the ankles.  She stopped the medication 3 or 4 days ago she slowly improving.  She wonders if the drug is causing a problem or diabetes might be causing some issues.    Current Outpatient Prescriptions   Medication Sig     ARIPiprazole (ABILIFY) 5 MG tablet Take 2.5 mg by mouth daily.     aspirin 325 MG tablet Take 325 mg by mouth daily.     atenolol (TENORMIN) 25 MG tablet Take 12.5 mg by mouth daily.      clobetasol (TEMOVATE) 0.05 % external solution      escitalopram oxalate (LEXAPRO) 20 MG tablet Take 10 mg by mouth daily.      flecainide (TAMBOCOR) 50 MG tablet Take 50 mg by mouth 2 (two) times a day.     FOLIC ACID/MULTIVIT-MIN/LUTEIN (CENTRUM SILVER ORAL) Take by mouth.     ivermectin (SOOLANTRA) 1 % Crea      ketoconazole (NIZORAL) 2 % shampoo Apply topically 2 (two) times a week. Apply to damp skin, lather, leave on 5 minutes, and rinse     lansoprazole (PREVACID) 30 MG capsule Take 15 mg by mouth daily.      LORazepam (ATIVAN) 1 MG tablet Take 1 mg by mouth bedtime.      meloxicam (MOBIC) 15 MG tablet TAKE 1 TABLET BY MOUTH EVERY DAY (Patient taking differently: prn)     metFORMIN (GLUCOPHAGE XR) 500 MG 24 hr tablet Take 1 tablet (500 mg total) by mouth daily with supper. (Patient taking differently: Take 250 mg by mouth 2 (two) times a day. )     peg 400-propylene glycol (SYSTANE) 0.4-0.3 % Drop Administer 1 drop to both eyes 4 (four) times a day as needed.       PSFHx: Tobacco Status:  She  reports that she has never smoked. She does not have any smokeless tobacco history on  "file.    Review of Systems:  A comprehensive review of systems is negative except for the comments above    Objective:    Ht 5' 7.25\" (1.708 m)  Wt (!) 308 lb (139.7 kg)  BMI 47.88 kg/m2  GENERAL: No acute distress.  Weight is still the same.  She is morbidly obese.  Blood pressure is 128/86.  Pulse 68.  Circulation is normal in the lower extremities and feet.  Monofilament fiber sensation normal in the ends and feet.  Able to walk on heels and toes okay.  Gait is normal.  Lungs are clear.  Heart shows a regular rhythm without ectopic beats.  She had an exam in the back of her neck because of a small lump.  She has a slightly inflamed hair follicle but no abscess.    Assessment & Plan   Concepción De is a 62 y.o. female.    Burning paresthesias in the lower extremities.  Questionable medication effect.  Will check an A1c and basic metabolic profile.  We will see what happens now that she has stopped the medication rixulti    Diagnoses and all orders for this visit:    Nonalcoholic fatty liver disease    Type 2 diabetes mellitus  -     Glycosylated Hemoglobin A1c  -     Basic Metabolic Panel    Paresthesias    Need for vaccination  -     Influenza, Seasonal Quad, Preservative Free 36+ Months        The following high BMI interventions were performed this visit: encouragement to exercise    Randall Lora MD  Transcription using voice recognition software, may contain typographical errors.    "

## 2021-06-14 NOTE — PROGRESS NOTES
OFFICE VISIT NOTE    Subjective:   Chief Complaint:  Follow-up ([facility])    82-year-old woman who returns with persistent complaints of pain and points to the right ankle region.  She also has a second problem she describes as numbness and some minor aching in the right groin and right thigh.  She feels the right lateral leg is numb to touch.  A recent repeat ultrasound of the right leg did reveal a short segment of deep vein thrombophlebitis in the right posterior tibial vein.  She has had no further pain or symptoms suggesting worsening of the problem    Current Outpatient Prescriptions   Medication Sig     ARIPiprazole (ABILIFY) 5 MG tablet Take 7.5 mg by mouth daily.      aspirin 325 MG tablet Take 325 mg by mouth daily.     atenolol (TENORMIN) 25 MG tablet Take 12.5 mg by mouth daily.      clobetasol (TEMOVATE) 0.05 % external solution      escitalopram oxalate (LEXAPRO) 20 MG tablet Take 10 mg by mouth daily.      flecainide (TAMBOCOR) 50 MG tablet Take 50 mg by mouth 2 (two) times a day.     FOLIC ACID/MULTIVIT-MIN/LUTEIN (CENTRUM SILVER ORAL) Take by mouth.     ivermectin (SOOLANTRA) 1 % Crea      ketoconazole (NIZORAL) 2 % shampoo Apply topically 2 (two) times a week. Apply to damp skin, lather, leave on 5 minutes, and rinse     lansoprazole (PREVACID) 30 MG capsule Take 15 mg by mouth daily.      LORazepam (ATIVAN) 1 MG tablet Take 1 mg by mouth bedtime.      meloxicam (MOBIC) 15 MG tablet TAKE 1 TABLET BY MOUTH EVERY DAY (Patient taking differently: prn)     metFORMIN (GLUCOPHAGE XR) 500 MG 24 hr tablet Take 1 tablet (500 mg total) by mouth daily with supper. (Patient taking differently: Take 250 mg by mouth 2 (two) times a day. )     methylPREDNISolone (MEDROL DOSEPACK) 4 mg tablet TK UTD PER PACKAGING     peg 400-propylene glycol (SYSTANE) 0.4-0.3 % Drop Administer 1 drop to both eyes 4 (four) times a day as needed.       Review of Systems:  A comprehensive review of systems is negative except for  "the comments above    Objective:    Pulse 60  Ht 5' 7.25\" (1.708 m)  Wt (!) 304 lb (137.9 kg)  SpO2 98%  BMI 47.26 kg/m2  GENERAL: No acute distress.  She does have some minor tenderness about 2 inches above the right medial malleolus.  There is some firmness there suggesting varicose veins inflammation.  There is no lack of arterial circulation.  Sensations intact.  Good range of motion to the right hip.  Gait seems normal.  She is obese.    Assessment & Plan   Concepción De is a 62 y.o. female.    We will repeat the ultrasound to see if there is any evidence of progression of the deep vein thrombophlebitis in the right leg.  I am also going to obtain a neurology consultation as she continues to complain of numbness in the entire right upper leg and hip region.  She has no back pain.    Diagnoses and all orders for this visit:    DVT (deep venous thrombosis)  -     US Venous Leg Right; Future; Expected date: 12/13/17    Numbness in right leg  -     Ambulatory referral to Neurology        Randall Lora MD  Transcription using voice recognition software, may contain typographical errors.    "

## 2021-06-14 NOTE — PROGRESS NOTES
OFFICE VISIT NOTE    Subjective:   Chief Complaint:  Follow-up (numb/tingling feels like her leg is asleep and hurts Rt foot to thigh. went to [facility] and had a US done and it showed a probable superficial thrombous right calf)    XT 2-year-old woman with a history of diabetes mellitus type 2 osteoarthritis as well as obesity.  She is in with a recent diagnosis of superficial phlebitis involving the right leg.  She had an inflamed varicose vein.  Ultrasound was negative for deep vein thrombophlebitis.  She has less discomfort in the calf and shin.  Starting to have some tingling and numbness from the foot all the way up to the groin.  No falls.  No loss of bowel or bladder function.    Current Outpatient Prescriptions   Medication Sig     ARIPiprazole (ABILIFY) 5 MG tablet Take 7.5 mg by mouth daily.      aspirin 325 MG tablet Take 325 mg by mouth daily.     atenolol (TENORMIN) 25 MG tablet Take 12.5 mg by mouth daily.      clobetasol (TEMOVATE) 0.05 % external solution      escitalopram oxalate (LEXAPRO) 20 MG tablet Take 10 mg by mouth daily.      flecainide (TAMBOCOR) 50 MG tablet Take 50 mg by mouth 2 (two) times a day.     FOLIC ACID/MULTIVIT-MIN/LUTEIN (CENTRUM SILVER ORAL) Take by mouth.     ivermectin (SOOLANTRA) 1 % Crea      ketoconazole (NIZORAL) 2 % shampoo Apply topically 2 (two) times a week. Apply to damp skin, lather, leave on 5 minutes, and rinse     lansoprazole (PREVACID) 30 MG capsule Take 15 mg by mouth daily.      LORazepam (ATIVAN) 1 MG tablet Take 1 mg by mouth bedtime.      meloxicam (MOBIC) 15 MG tablet TAKE 1 TABLET BY MOUTH EVERY DAY (Patient taking differently: prn)     metFORMIN (GLUCOPHAGE XR) 500 MG 24 hr tablet Take 1 tablet (500 mg total) by mouth daily with supper. (Patient taking differently: Take 250 mg by mouth 2 (two) times a day. )     peg 400-propylene glycol (SYSTANE) 0.4-0.3 % Drop Administer 1 drop to both eyes 4 (four) times a day as needed.       PSFHx: Tobacco  "Status:  She  reports that she has never smoked. She does not have any smokeless tobacco history on file.    Review of Systems:  A comprehensive review of systems is negative except for the comments above    Objective:    Pulse 61  Ht 5' 7.25\" (1.708 m)  Wt (!) 305 lb (138.3 kg)  SpO2 96%  BMI 47.42 kg/m2  GENERAL: No acute distress.  She looks healthy.  She is overweight.  She is afebrile.  Oxygen saturations 96%.  She has significant obesity.  There is no obvious erythema or palpable abnormality in the calf or shin area today.  She has normal monofilament fiber sensation above the knee but decreased in the right leg in the shin and calf region.  She can stand on her heels and toes okay.  Arterial circulation seems intact.  Good flexion extension of the hip and knee.  Excellent lungs are clear.  Right leg raising test is negative.  Assessment & Plan   Concepción De is a 62 y.o. female.    She has superficial phlebitis which is improving.  I told her she should take ibuprofen 400 mg 3 times daily as well as using a hot pack on the lower leg.  I would expect the ibuprofen also help with her sciatica-like symptoms.  If her symptoms should persist more than 2 weeks, I will consider MRI of the lumbosacral spine.  Needs to check blood sugars on a regular basis.    Diagnoses and all orders for this visit:    Superficial phlebitis    Lumbar radiculopathy    Type 2 diabetes mellitus        The following high BMI interventions were performed this visit: encouragement to exercise    Randall Lora MD  Transcription using voice recognition software, may contain typographical errors.    "

## 2021-06-17 ENCOUNTER — TELEPHONE (OUTPATIENT)
Dept: NEUROLOGY | Facility: CLINIC | Age: 66
End: 2021-06-17

## 2021-06-17 NOTE — PROGRESS NOTES
OFFICE VISIT NOTE    Subjective:   Chief Complaint:  Cough (X 1 month, low abd cramping X 1 months having alot of bowel movements, up to 5 a day not diarrhea. has a buldge in lower abd)    62-year-old woman with a history of obesity, borderline diabetes mellitus type 2, nonalcoholic fatty liver disease.  She also has a bipolar disorder is followed by psychiatry.  She is in today with a complaint of having a cough is been lasting 2-3 weeks.  No definite fever with this.  Minimum sputum production.  Cough is slowly improving.  Last week she also developed some left-sided abdominal pain some looser stools but no natalio watery stools.  No blood in the stool.  No vomiting.  Minor anorexia.    Current Outpatient Prescriptions   Medication Sig     ARIPiprazole (ABILIFY) 5 MG tablet Take 7.5 mg by mouth daily.      aspirin 325 MG tablet Take 325 mg by mouth daily.     atenolol (TENORMIN) 25 MG tablet Take 12.5 mg by mouth daily.      clobetasol (TEMOVATE) 0.05 % external solution      escitalopram oxalate (LEXAPRO) 20 MG tablet Take 10 mg by mouth daily.      flecainide (TAMBOCOR) 50 MG tablet Take 50 mg by mouth 2 (two) times a day.     FOLIC ACID/MULTIVIT-MIN/LUTEIN (CENTRUM SILVER ORAL) Take by mouth.     ivermectin (SOOLANTRA) 1 % Crea      ketoconazole (NIZORAL) 2 % shampoo Apply topically 2 (two) times a week. Apply to damp skin, lather, leave on 5 minutes, and rinse     lansoprazole (PREVACID) 30 MG capsule Take 15 mg by mouth daily.      LORazepam (ATIVAN) 1 MG tablet Take 1 mg by mouth bedtime.      meloxicam (MOBIC) 15 MG tablet TAKE 1 TABLET BY MOUTH EVERY DAY (Patient taking differently: prn)     metFORMIN (GLUCOPHAGE XR) 500 MG 24 hr tablet Take 1 tablet (500 mg total) by mouth daily with supper. (Patient taking differently: Take 250 mg by mouth 2 (two) times a day. )     peg 400-propylene glycol (SYSTANE) 0.4-0.3 % Drop Administer 1 drop to both eyes 4 (four) times a day as needed.     amoxicillin-clavulanate  "(AUGMENTIN) 875-125 mg per tablet Take 1 tablet by mouth 2 (two) times a day for 5 days.       PSFHx: Tobacco Status:  She  reports that she has never smoked. She has never used smokeless tobacco.    Review of Systems:  A comprehensive review of systems is negative except for the comments above    Objective:    Pulse 61  Temp 97.9  F (36.6  C) (Oral)   Ht 5' 7.25\" (1.708 m)  Wt (!) 304 lb (137.9 kg)  SpO2 95%  BMI 47.26 kg/m2  GENERAL: No acute distress.  Weight is stable but at 304 pounds.  Blood pressure 1/26/1975.  Pulse is 60.  Temperature 97.9.  No jaundice.  ENT examination looks normal.  Lungs are clear.  No rales are heard.  Heart shows a regular rhythm without significant murmur gallop or rub.  Abdomen is quite obese.  Bowel sounds are normal.  She does have some tenderness in the left lower quadrant.  No palpable masses.  No organomegaly.  No peripheral edema.  No pitting edema.    Assessment & Plan   Concepción De is a 62 y.o. female.    Upper respiratory infection seems to be slowly resolving.  Abdominal findings and history are consistent with diverticulitis.  I described diet for her to follow.  Will start her on Augmentin 1 tablet p.o. twice daily with food.  I will check an A1c at this time.  I have asked her to continue to diet and lose weight in the future.    Diagnoses and all orders for this visit:    Type 2 diabetes mellitus  -     Glycosylated Hemoglobin A1c    Diverticulitis  -     amoxicillin-clavulanate (AUGMENTIN) 875-125 mg per tablet; Take 1 tablet by mouth 2 (two) times a day for 5 days.  Dispense: 10 tablet; Refill: 0        The following high BMI interventions were performed this visit: encouragement to exercise    Randall Lora MD  Transcription using voice recognition software, may contain typographical errors.    "

## 2021-06-17 NOTE — TELEPHONE ENCOUNTER
Pt called re some dizziness, double vision, eyes twitching. She is not sure what direction to take. Who to see ? Please call to discuss. [phone] or [phone]

## 2021-06-18 NOTE — TELEPHONE ENCOUNTER
"Dr. Choudhury-  Spoke to pt, relayed Dr. Rahman message below.  Pt was wondering if you had any other thoughts regarding the lightheaded feeling and \"jittery eyes\" she is still continuing to have.  She does not follow up with Dr. Rahman until 10/1/21 aft er the neuropsych testing. She is wondering if there is any other testing she could be doing to see what is causing this? She states it is starting to affect her balance as well.  Pt is aware you are out of the office today, 6/18/21.  Thoughts?    Molly Bonilla LPN on 6/18/2021 at 1:40 PM                    "

## 2021-06-18 NOTE — TELEPHONE ENCOUNTER
"Dr. Meche Beebe is a Dr. Choudhury pt who is scheduled to see you on October 1st.   Dr. Choudhury is not in clinic today.  Pt states she is still having a lightheaded feeling when she stands up. States her eyes feel \"jittery\".  These symptoms are the same as when she was seen by Dr. Choudhury on 5/11/21, no worse, but no better.  She did complete the MRI and bloodwork Dr. HERBERT ordered and HAS received results on all of that.  Still has to complete neuropsych testing.  She is wondering if any thoughts on what else she can do?     Pt can be reached on her cell phone after 1:30pm at [phone]      Molly Bonilla LPN on 6/18/2021 at 12:35 PM      "

## 2021-06-18 NOTE — TELEPHONE ENCOUNTER
No new thoughts at this time.  Should complete the work-up though as outlined by Dr. Choudhury.    And when she comes for her follow-up visit I can examine her and see her in person to see if there is any other suggestions or ideas I have at that point.    Robbie Rahman MD on 6/18/2021 at 12:42 PM

## 2021-06-19 NOTE — LETTER
Letter by Randall Lora MD at      Author: Randall Lora MD Service: -- Author Type: --    Filed:  Encounter Date: 3/14/2019 Status: (Other)       Concepción De  [address] [address]             March 14, 2019         Dear Ms. De,    Below are the results from your recent visit:    Resulted Orders   Glycosylated Hemoglobin A1c   Result Value Ref Range    Hemoglobin A1c 5.8 3.5 - 6.0 %   C-Reactive Protein   Result Value Ref Range    CRP 2.7 (H) 0.0 - 0.8 mg/dL       Concepción recent labs are reviewed.  Hemoglobin A1c was normal at 5.8 which means excellent blood sugar control the past 3 months.  CRP, a measure of inflammation, was slightly elevated.    Please call with questions or contact us using [employer].    Sincerely,        Electronically signed by Randall Lora MD

## 2021-06-19 NOTE — LETTER
Letter by Randall Lora MD at      Author: Randall Lora MD Service: -- Author Type: --    Filed:  Encounter Date: 9/4/2019 Status: (Other)         Concepción De  [address] [facility] [address]             September 4, 2019         Dear Ms. De,    Below are the results from your recent visit:    Resulted Orders   C-Reactive Protein   Result Value Ref Range    CRP 2.6 (H) 0.0 - 0.8 mg/dL   HM1 (CBC with Diff)   Result Value Ref Range    WBC 10.0 4.0 - 11.0 thou/uL    RBC 5.21 3.80 - 5.40 mill/uL    Hemoglobin 12.7 12.0 - 16.0 g/dL    Hematocrit 41.2 35.0 - 47.0 %    MCV 79 (L) 80 - 100 fL    MCH 24.4 (L) 27.0 - 34.0 pg    MCHC 30.8 (L) 32.0 - 36.0 g/dL    RDW 15.8 (H) 11.0 - 14.5 %    Platelets 256 140 - 440 thou/uL    MPV 11.1 8.5 - 12.5 fL    Neutrophils % 63 50 - 70 %    Lymphocytes % 27 20 - 40 %    Monocytes % 9 2 - 10 %    Eosinophils % 1 0 - 6 %    Basophils % 0 0 - 2 %    Neutrophils Absolute 6.3 2.0 - 7.7 thou/uL    Lymphocytes Absolute 2.7 0.8 - 4.4 thou/uL    Monocytes Absolute 0.9 0.0 - 0.9 thou/uL    Eosinophils Absolute 0.1 0.0 - 0.4 thou/uL    Basophils Absolute 0.0 0.0 - 0.2 thou/uL       Concepción, recent labs looked okay.  Your white blood count was in a normal range.  I am hoping the antibiotics improve your situation.    Please call with questions or contact us using [employer].    Sincerely,        Electronically signed by Randall Lora MD

## 2021-06-20 NOTE — LETTER
Letter by Randall Lora MD at      Author: Randall Lora MD Service: -- Author Type: --    Filed:  Encounter Date: 5/13/2020 Status: (Other)         Concepción De  [address] [facility] [address]             May 18, 2020         Dear Ms. De,    Below are the results from your recent visit:    Resulted Orders   Glycosylated Hemoglobin A1c   Result Value Ref Range    Hemoglobin A1c 5.9 (H) <=5.6 %      Comment:      Prediabetes:   HBA1c       5.7 to 6.4%        Diabetes:        HBA1c        >=6.5%   Patients with Hgb F >5%, total bilirubin >10.0 mg/dL, abnormal red cell turnover, severe renal or hepatic disease or malignancy should not have this A1C method used to diagnose or monitor diabetes.       Basic Metabolic Panel   Result Value Ref Range    Sodium 139 136 - 145 mmol/L    Potassium 4.6 3.5 - 5.0 mmol/L    Chloride 100 98 - 107 mmol/L    CO2 28 22 - 31 mmol/L    Anion Gap, Calculation 11 5 - 18 mmol/L    Glucose 91 70 - 125 mg/dL    Calcium 9.5 8.5 - 10.5 mg/dL    BUN 10 8 - 22 mg/dL    Creatinine 0.76 0.60 - 1.10 mg/dL    GFR MDRD Af Amer >60 >60 mL/min/1.73m2    GFR MDRD Non Af Amer >60 >60 mL/min/1.73m2    Narrative    Fasting Glucose reference range is 70-99 mg/dL per  American Diabetes Association (ADA) guidelines.           Please call with questions or contact us using [employer].    Sincerely,        Electronically signed by Randall Lora MD

## 2021-06-20 NOTE — LETTER
Letter by Randall Lora MD at      Author: Randall Lora MD Service: -- Author Type: --    Filed:  Encounter Date: 5/13/2020 Status: (Other)         Concepción De  [address] [facility] [address]             May 13, 2020         Dear Ms. De,    Below are the results from your recent visit:    Resulted Orders   Basic Metabolic Panel   Result Value Ref Range    Sodium 139 136 - 145 mmol/L    Potassium 4.6 3.5 - 5.0 mmol/L    Chloride 100 98 - 107 mmol/L    CO2 28 22 - 31 mmol/L    Anion Gap, Calculation 11 5 - 18 mmol/L    Glucose 91 70 - 125 mg/dL    Calcium 9.5 8.5 - 10.5 mg/dL    BUN 10 8 - 22 mg/dL    Creatinine 0.76 0.60 - 1.10 mg/dL    GFR MDRD Af Amer >60 >60 mL/min/1.73m2    GFR MDRD Non Af Amer >60 >60 mL/min/1.73m2    Narrative    Fasting Glucose reference range is 70-99 mg/dL per  American Diabetes Association (ADA) guidelines.       Concepción, recent labs are reviewed.  Your blood test showed normal kidney functions as well as electrolytes.  Things all look good.    Please call with questions or contact us using [employer].    Sincerely,        Electronically signed by Randall Lora MD

## 2021-06-20 NOTE — PROGRESS NOTES
"OFFICE VISIT NOTE    Subjective:   Chief Complaint:  Abdominal Pain (cramps, frequent BM's, burning in abd)    63-year-old woman with a history of obesity as well as diabetes mellitus type 2.  She is complaining of having frequent episodes of abdominal cramping sometimes loose stools.  Minor nausea at times.  No passage of blood.  No fevers or chills.  She is diabetic but does not check her sugars.  3 months ago her A1c was good at 6.1.    Current Outpatient Prescriptions   Medication Sig     ARIPiprazole (ABILIFY) 5 MG tablet Take 7.5 mg by mouth daily.      aspirin 325 MG tablet Take 325 mg by mouth daily.     atenolol (TENORMIN) 25 MG tablet Take 12.5 mg by mouth daily.      clobetasol (TEMOVATE) 0.05 % external solution      clonazePAM (KLONOPIN) 2 MG tablet TK  1/2 T PO  DAILY     escitalopram oxalate (LEXAPRO) 20 MG tablet Take 10 mg by mouth daily.      flecainide (TAMBOCOR) 50 MG tablet Take 50 mg by mouth 2 (two) times a day.     FOLIC ACID/MULTIVIT-MIN/LUTEIN (CENTRUM SILVER ORAL) Take by mouth.     ivermectin (SOOLANTRA) 1 % Crea      ketoconazole (NIZORAL) 2 % shampoo Apply topically 2 (two) times a week. Apply to damp skin, lather, leave on 5 minutes, and rinse     lansoprazole (PREVACID) 30 MG capsule Take 15 mg by mouth daily.      LORazepam (ATIVAN) 1 MG tablet Take 1 mg by mouth bedtime.      meloxicam (MOBIC) 15 MG tablet TAKE 1 TABLET BY MOUTH EVERY DAY (Patient taking differently: prn)     metFORMIN (GLUCOPHAGE XR) 500 MG 24 hr tablet Take 1 tablet (500 mg total) by mouth daily with supper. (Patient taking differently: Take 250 mg by mouth 2 (two) times a day. )     peg 400-propylene glycol (SYSTANE) 0.4-0.3 % Drop Administer 1 drop to both eyes 4 (four) times a day as needed.       Review of Systems:  A comprehensive review of systems is negative except for the comments above    Objective:    Pulse (!) 56  Ht 5' 7.25\" (1.708 m)  Wt (!) 305 lb (138.3 kg)  SpO2 97%  BMI 47.42 kg/m2  GENERAL: " No acute distress.  Weight is still quite high.  Blood pressures 1 3284.  Pulse 60 and regular.  No jaundice.  The abdomen is obese.  No significant tenderness or any rebound.  No obvious masses or any organomegaly.  No evidence of any incarcerated hernia.    Assessment & Plan   Concepción De is a 63 y.o. female.    Abdominal pain which is cramping in nature.  I wonder if metformin is playing some role here.  Will reduce metformin to 500 mg once daily after supper.  She will eat a high-protein diet see if she can lose some weight.  If her symptoms persist despite reducing metformin, would consider referring her for colonoscopy.    Diagnoses and all orders for this visit:    LLQ abdominal pain    Type 2 diabetes mellitus (H)        Randall Lora MD  Transcription using voice recognition software, may contain typographical errors.

## 2021-06-23 NOTE — TELEPHONE ENCOUNTER
Looks like we have some appointment slots July 13, 2014 and 15.  She can follow-up with me if she would like.

## 2021-06-24 NOTE — TELEPHONE ENCOUNTER
Spoke to pt, kwesit scheduled with Dr. Choudhury on 7/15/21.  She states she is also having her eye doctor review everything and will see if he has any suggestions as well.    Molly Bonilla LPN on 6/24/2021 at 1:35 PM

## 2021-06-24 NOTE — PROGRESS NOTES
OFFICE VISIT NOTE    Subjective:   Chief Complaint:  Leg Pain (and weakness left outter leg below the need. check hernia)    This is a 63-year-old woman who is been having some discomfort in the left anterior shin region for the past 3 days.  No history of trauma.  No redness, swelling, fevers, or dyspnea.  She is borderline diabetic.  She has a history of obesity.  No polyuria or polydipsia.  Past history of paroxysmal atrial fibrillation but no breakthrough tachycardias.    Current Outpatient Medications   Medication Sig     ARIPiprazole (ABILIFY) 5 MG tablet Take 7.5 mg by mouth daily.      aspirin 325 MG tablet Take 325 mg by mouth daily.     atenolol (TENORMIN) 25 MG tablet Take 12.5 mg by mouth daily.      clobetasol (TEMOVATE) 0.05 % external solution      escitalopram oxalate (LEXAPRO) 20 MG tablet Take 10 mg by mouth daily.      flecainide (TAMBOCOR) 50 MG tablet Take 50 mg by mouth 2 (two) times a day.     FOLIC ACID/MULTIVIT-MIN/LUTEIN (CENTRUM SILVER ORAL) Take by mouth.     ivermectin (SOOLANTRA) 1 % Crea      ketoconazole (NIZORAL) 2 % shampoo Apply topically 2 (two) times a week. Apply to damp skin, lather, leave on 5 minutes, and rinse     lansoprazole (PREVACID) 30 MG capsule Take 15 mg by mouth daily.      metFORMIN (GLUCOPHAGE XR) 500 MG 24 hr tablet Take 1 tablet (500 mg total) by mouth daily with supper. (Patient taking differently: Take 250 mg by mouth 2 (two) times a day. )     peg 400-propylene glycol (SYSTANE) 0.4-0.3 % Drop Administer 1 drop to both eyes 4 (four) times a day as needed.     gabapentin (NEURONTIN) 100 MG capsule Take 100 mg by mouth 3 (three) times a day.     LORazepam (ATIVAN) 1 MG tablet Take 1 mg by mouth bedtime.      meloxicam (MOBIC) 15 MG tablet TAKE 1 TABLET BY MOUTH EVERY DAY (Patient taking differently: prn)       PSFHx: Tobacco Status:  She  reports that  has never smoked. she has never used smokeless tobacco.    Review of Systems:  A comprehensive review of  "systems is negative except for the comments above    Objective:    Pulse (!) 58   Ht 5' 7.25\" (1.708 m)   Wt (!) 305 lb (138.3 kg)   SpO2 96%   BMI 47.42 kg/m    GENERAL: No acute distress.  She is obese but no distress.  Blood pressure 134/82.  Pulse 76 and regular.  Afebrile.  Left leg looks normal.  There is no erythema.  There is no tenderness or swelling.  Pedal pulses seem normal.  Monofilament fiber sensation is normal in the upper and lower extremities.  Abdomen is obese.  She has a small ventral hernia but no evidence of incarcerated tissue.  No masses.  Heart does show a regular rhythm without ectopic beats.  No signs of any atrial fibrillation.  Mentally she is sharp today.  She has past history of depression and is taking medications.  She seems to be stable from that standpoint.    Assessment & Plan   Concepción De is a 63 y.o. female.    Sharp pain is felt in the left anterior lower leg.  No evidence of phlebitis or circulatory impairment.  This could be a neuralgia.  We will try her on gabapentin 100 mg 3 times daily.  Check a hemoglobin A1c and a CRP.  She also wants a flu shot.  She needs to lose some weight.  She will continue taking flecainide to maintain sinus rhythm and avoid atrial fibrillation.  Her antidepressants remain the same.    Diagnoses and all orders for this visit:    Pain of left lower leg  -     C-Reactive Protein  -     gabapentin (NEURONTIN) 100 MG capsule  Dispense: 30 capsule; Refill: 2    Morbid obesity (H)    Major depression in complete remission (H)    Paroxysmal atrial fibrillation (H)    Deep vein thrombosis (DVT) of distal vein of lower extremity, unspecified chronicity, unspecified laterality (H)    Type 2 diabetes mellitus without complication, without long-term current use of insulin (H)  -     Glycosylated Hemoglobin A1c    Need for vaccination  -     Influenza, Seasonal Quad, Preservative Free 36+ Months        The following high BMI interventions were " performed this visit: encouragement to exercise    Randall Lora MD  Transcription using voice recognition software, may contain typographical errors.

## 2021-06-25 NOTE — PROGRESS NOTES
OFFICE VISIT NOTE    Subjective:   Chief Complaint:  Follow-up (both legs are hurting now, calves cramping, feels hot all the time, numbness in feet. went to ER 3/15/19)    63-year-old woman in for follow-up regarding complaints of cramps in her legs as well as tingling of her toes.  She recently went to the emergency room thinking she might have a blood clot.  Ultrasound was negative for any deep vein thrombophlebitis.  She is definitely having some charley horses at night but describes a second tingling in her toes.  No falls or injuries.  Most recent hemoglobin A1c was 5.8.    Current Outpatient Medications   Medication Sig     ARIPiprazole (ABILIFY) 5 MG tablet Take 7.5 mg by mouth daily.      aspirin 325 MG tablet Take 325 mg by mouth daily.     atenolol (TENORMIN) 25 MG tablet Take 12.5 mg by mouth daily.      clobetasol (TEMOVATE) 0.05 % external solution      escitalopram oxalate (LEXAPRO) 20 MG tablet Take 10 mg by mouth daily.      flecainide (TAMBOCOR) 50 MG tablet Take 50 mg by mouth 2 (two) times a day.     FOLIC ACID/MULTIVIT-MIN/LUTEIN (CENTRUM SILVER ORAL) Take by mouth.     gabapentin (NEURONTIN) 100 MG capsule Take 100 mg by mouth 3 (three) times a day.     ivermectin (SOOLANTRA) 1 % Crea      ketoconazole (NIZORAL) 2 % shampoo Apply topically 2 (two) times a week. Apply to damp skin, lather, leave on 5 minutes, and rinse     lansoprazole (PREVACID) 30 MG capsule Take 15 mg by mouth daily.      LORazepam (ATIVAN) 1 MG tablet Take 1 mg by mouth bedtime.      meloxicam (MOBIC) 15 MG tablet TAKE 1 TABLET BY MOUTH EVERY DAY (Patient taking differently: prn)     metFORMIN (GLUCOPHAGE XR) 500 MG 24 hr tablet Take 1 tablet (500 mg total) by mouth daily with supper. (Patient taking differently: Take 250 mg by mouth 2 (two) times a day. )     peg 400-propylene glycol (SYSTANE) 0.4-0.3 % Drop Administer 1 drop to both eyes 4 (four) times a day as needed.       PSFHx: Tobacco Status:  She  reports that  has  "never smoked. she has never used smokeless tobacco.    Review of Systems:  A comprehensive review of systems is negative except for the comments above    Objective:    Pulse (!) 55   Ht 5' 7.25\" (1.708 m)   Wt (!) 305 lb (138.3 kg)   SpO2 97%   BMI 47.42 kg/m    GENERAL: No acute distress.  She is significantly obese.  Blood pressure 116/78.  Pulse 60 and regular.  Moderate varicose veins in the left leg.  I see no signs of phlebitis.  There is no significant edema.  Monofilament fiber sensation is normal in the hands feet and legs.  Pedal pulses seem normal for age.  Stand on one leg for about 3 or 4 seconds and then she tends to fall.  Romberg test is negative.  Strength in the upper extremities normal.  She can walk forward and backward without difficulty.  Heart shows a regular rhythm without murmur gallop.    Assessment & Plan   Concepción De is a 63 y.o. female.  Recent labs were reviewed with her.  A1c was excellent.  Pains of charley horses as well as tingling of the toes.  No evidence of active diabetes.  Of asked her to diet and lose some weight.  I told her to start drinking some quinine water at night to see if we can reduce leg cramps.  Also asked her to get on a walking program to see if the tingling in the legs improved.  Needs to lose some weight.    Diagnoses and all orders for this visit:    Peripheral polyneuropathy (H)        The following high BMI interventions were performed this visit: encouragement to exercise    Randall Lora MD  Transcription using voice recognition software, may contain typographical errors.    "

## 2021-06-25 NOTE — TELEPHONE ENCOUNTER
Seen Tuesday PCP for stabbing pain left leg side of calf.  Started gabapentin.  LEg hurts worse behind knee, ankle and calf swollen. Very achy.    Cannot sleep.  Painful for calf to touch bed. No shortness of breath or difficulty breathing.    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    PCP out rest of week.    She will go to ED.    Marleny Shipley RN, [facility] Nurse Triage/Med Refills RN       Reason for Disposition    Thigh, calf, or ankle swelling in only one leg    Thigh or calf pain in only one leg and present > 1 hour    Protocols used: LEG PAIN-A-OH

## 2021-07-15 ENCOUNTER — OFFICE VISIT (OUTPATIENT)
Dept: NEUROLOGY | Facility: CLINIC | Age: 66
End: 2021-07-15
Payer: COMMERCIAL

## 2021-07-15 VITALS
BODY MASS INDEX: 45.99 KG/M2 | WEIGHT: 293 LBS | HEART RATE: 54 BPM | SYSTOLIC BLOOD PRESSURE: 122 MMHG | DIASTOLIC BLOOD PRESSURE: 67 MMHG | HEIGHT: 67 IN

## 2021-07-15 DIAGNOSIS — H53.2 DIPLOPIA: Primary | ICD-10-CM

## 2021-07-15 PROCEDURE — 99213 OFFICE O/P EST LOW 20 MIN: CPT | Performed by: PSYCHIATRY & NEUROLOGY

## 2021-07-15 RX ORDER — PANTOPRAZOLE SODIUM 20 MG/1
40 TABLET, DELAYED RELEASE ORAL DAILY
COMMUNITY

## 2021-07-15 ASSESSMENT — MIFFLIN-ST. JEOR: SCORE: 2024.6

## 2021-07-15 NOTE — PROGRESS NOTES
[facility] Neurology  [place]    Concepción De MRN# [id number]   Age: 65 year old YOB: 1955               Assessment and Plan:   Assessment:   Primary progressive aphasia    Intermittent double vision and right eye ptosis  Work-up including brain MRI, TSH, myasthenia antibodies has been unremarkable.    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  There is no specific treatment to recommend right now, diabetic 3rd nerve palsies do improve over time.    Imbalance-she does have some loss of sensation in the feet but reflexes are good (myelopathy versus neuropathy).  She has already had MRI of the cervical spine.        Plan:     I do not have any new work-up to recommend today.             Chief Complaint/HPI:     I saw Concepción for follow-up today here in our [place] office.  She was instructed to follow-up with us again regarding her symptoms of intermittent double vision and jerking of the right eye.  These symptoms are still occurring occasionally, especially later in the day when she may feel more fatigued.  She also mentions some difficulty opening that right eye during these times.  She complains the right eyeball jiggles (eyeball, not eyelid).  She does mention double vision when looking at things closer in, further way is fine.  Looking to one side or the other does not exacerbate the double vision            Past Medical History:    has a past medical history of Bipolar affective (H), Depressive disorder, GERD (gastroesophageal reflux disease), Obesity, Paroxysmal atrial fibrillation (H), and Pre-diabetes.          Past Surgical History:    has a past surgical history that includes GYN surgery; ENT surgery; GI surgery; appendectomy; Gastroplasty Vertical Banded; Radical Hysterectomy (Bilateral, 2003); and Breast Cyst Excision (Left).          Social History:     Social History     Tobacco Use      Smoking status: Never Smoker     Smokeless tobacco: Never Used   Substance Use Topics     Alcohol use: Not Currently             Family History:     Family History   Problem Relation Age of Onset     Cancer Mother      Cancer Father      Cirrhosis Father      Breast Cancer Mother 45.00     Ovarian Cancer Maternal Grandmother                 Allergies:     Allergies   Allergen Reactions     Clozapine Anaphylaxis     Ciprofloxacin Hives and Itching     Nitrofurantoin Other (See Comments) and Unknown     Flu per Patient  Flu per Patient  Flu per Patient       Pantoprazole Other (See Comments)     Extremity swelling, dry mouth     Sulfa Drugs      Sulfasalazine      Other reaction(s): *Unknown             Medications:     Current Outpatient Medications:      ARIPiprazole (ABILIFY) 5 MG tablet, Take 7.5 mg by mouth daily, Disp: , Rfl:      aspirin 81 MG EC tablet, Take 81 mg by mouth daily, Disp: , Rfl:      atenolol (TENORMIN) 25 MG tablet, Take 25 mg by mouth daily, Disp: , Rfl:      clonazePAM (KLONOPIN) 0.5 MG tablet, Take 0.5 mg by mouth 2 times daily as needed, Disp: , Rfl:      escitalopram (LEXAPRO) 20 MG tablet, Take 20 mg by mouth daily, Disp: , Rfl:      furosemide (LASIX) 20 MG tablet, TAKE 1/2 TO 1 TABLET BY MOUTH DAILY AS NEEDED FOR LOWER EXTREMITY SWELLING, Disp: , Rfl:      metFORMIN (GLUCOPHAGE) 500 MG tablet, Take 500 mg by mouth daily, Disp: , Rfl:      Multiple Vitamins-Minerals (PRESERVISION AREDS 2 PO), , Disp: , Rfl:      pantoprazole (PROTONIX) 20 MG EC tablet, Take 40 mg by mouth daily, Disp: , Rfl:      polyethylene glycol-propylene glycol (SYSTANE ULTRA) 0.4-0.3 % SOLN ophthalmic solution, Place 1 drop into both eyes every hour as needed for dry eyes, Disp: , Rfl:      famotidine (PEPCID) 20 MG tablet, Take 20 mg by mouth (Patient not taking: Reported on 7/15/2021), Disp: , Rfl:               Physical Exam:     /67 (BP Location: Right arm, Patient Position: Sitting)   Pulse 54   Ht  "1.702 m (5' 7\")   Wt 144.7 kg (319 lb)   LMP  (LMP Unknown)   BMI 49.96 kg/m     Awake, alert, mild word finding difficulty at times, no dysarthria  Oriented x3, attention is fine  Cranial nerves II - XII tested, I do not see any clear disconjugate gaze today, no nystagmus, no ptosis  There is no focal or generalized weakness in the extremities  Finger nose finger testing is normal  Rapid alternating movements are normal on both sides  Tone is normal on both sides  Sensation testing shows markedly diminished vibration sensation in the feet, pinprick is intact  Gait is a little wide-based.  She does better walking on toes yhan heels.  On Romberg test she sways but does not fall.      Richardson Choudhury MD        "

## 2021-07-15 NOTE — LETTER
7/15/2021         RE: Concepción De  [address]  [facility] [address]        Dear Colleague,    Thank you for referring your patient, Concepción De, to the [facility] [place]. Please see a copy of my visit note below.    [facility]  [place]    Concepción De MRN# [id number]   Age: 65 year old YOB: 1955               Assessment and Plan:   Assessment:   Primary progressive aphasia    Intermittent double vision and right eye ptosis  Work-up including brain MRI, TSH, myasthenia antibodies has been unremarkable.    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  There is no specific treatment to recommend right now, diabetic 3rd nerve palsies do improve over time.    Imbalance-she does have some loss of sensation in the feet but reflexes are good (myelopathy versus neuropathy).  She has already had MRI of the cervical spine.        Plan:     I do not have any new work-up to recommend today.             Chief Complaint/HPI:     I saw Concepción for follow-up today here in our [place] office.  She was instructed to follow-up with us again regarding her symptoms of intermittent double vision and jerking of the right eye.  These symptoms are still occurring occasionally, especially later in the day when she may feel more fatigued.  She also mentions some difficulty opening that right eye during these times.  She complains the right eyeball jiggles (eyeball, not eyelid).  She does mention double vision when looking at things closer in, further way is fine.  Looking to one side or the other does not exacerbate the double vision            Past Medical History:    has a past medical history of Bipolar affective (H), Depressive disorder, GERD (gastroesophageal reflux disease), Obesity, Paroxysmal atrial fibrillation (H), and Pre-diabetes.          Past  Surgical History:    has a past surgical history that includes GYN surgery; ENT surgery; GI surgery; appendectomy; Gastroplasty Vertical Banded; Radical Hysterectomy (Bilateral, 2003); and Breast Cyst Excision (Left).          Social History:     Social History     Tobacco Use     Smoking status: Never Smoker     Smokeless tobacco: Never Used   Substance Use Topics     Alcohol use: Not Currently             Family History:     Family History   Problem Relation Age of Onset     Cancer Mother      Cancer Father      Cirrhosis Father      Breast Cancer Mother 45.00     Ovarian Cancer Maternal Grandmother                 Allergies:     Allergies   Allergen Reactions     Clozapine Anaphylaxis     Ciprofloxacin Hives and Itching     Nitrofurantoin Other (See Comments) and Unknown     Flu per Patient  Flu per Patient  Flu per Patient       Pantoprazole Other (See Comments)     Extremity swelling, dry mouth     Sulfa Drugs      Sulfasalazine      Other reaction(s): *Unknown             Medications:     Current Outpatient Medications:      ARIPiprazole (ABILIFY) 5 MG tablet, Take 7.5 mg by mouth daily, Disp: , Rfl:      aspirin 81 MG EC tablet, Take 81 mg by mouth daily, Disp: , Rfl:      atenolol (TENORMIN) 25 MG tablet, Take 25 mg by mouth daily, Disp: , Rfl:      clonazePAM (KLONOPIN) 0.5 MG tablet, Take 0.5 mg by mouth 2 times daily as needed, Disp: , Rfl:      escitalopram (LEXAPRO) 20 MG tablet, Take 20 mg by mouth daily, Disp: , Rfl:      furosemide (LASIX) 20 MG tablet, TAKE 1/2 TO 1 TABLET BY MOUTH DAILY AS NEEDED FOR LOWER EXTREMITY SWELLING, Disp: , Rfl:      metFORMIN (GLUCOPHAGE) 500 MG tablet, Take 500 mg by mouth daily, Disp: , Rfl:      Multiple Vitamins-Minerals (PRESERVISION AREDS 2 PO), , Disp: , Rfl:      pantoprazole (PROTONIX) 20 MG EC tablet, Take 40 mg by mouth daily, Disp: , Rfl:      polyethylene glycol-propylene glycol (SYSTANE ULTRA) 0.4-0.3 % SOLN ophthalmic solution, Place 1 drop into both eyes  "every hour as needed for dry eyes, Disp: , Rfl:      famotidine (PEPCID) 20 MG tablet, Take 20 mg by mouth (Patient not taking: Reported on 7/15/2021), Disp: , Rfl:               Physical Exam:     /67 (BP Location: Right arm, Patient Position: Sitting)   Pulse 54   Ht 1.702 m (5' 7\")   Wt 144.7 kg (319 lb)   LMP  (LMP Unknown)   BMI 49.96 kg/m     Awake, alert, mild word finding difficulty at times, no dysarthria  Oriented x3, attention is fine  Cranial nerves II - XII tested, I do not see any clear disconjugate gaze today, no nystagmus, no ptosis  There is no focal or generalized weakness in the extremities  Finger nose finger testing is normal  Rapid alternating movements are normal on both sides  Tone is normal on both sides  Sensation testing shows markedly diminished vibration sensation in the feet, pinprick is intact  Gait is a little wide-based.  She does better walking on toes yhan heels.  On Romberg test she sways but does not fall.      Richardson Choudhury MD            Again, thank you for allowing me to participate in the care of your patient.        Sincerely,        Richardson Choudhury MD    "

## 2021-07-15 NOTE — NURSING NOTE
"Chief Complaint   Patient presents with     Eye Problem     Double vision, \"jittery eyes\", seeing spots      Marifer Ferreira CMA on 7/15/2021 at 8:52 AM    "

## 2021-09-14 ENCOUNTER — OFFICE VISIT (OUTPATIENT)
Dept: NEUROLOGY | Facility: CLINIC | Age: 66
End: 2021-09-14
Payer: COMMERCIAL

## 2021-09-14 DIAGNOSIS — R47.89 WORD FINDING DIFFICULTY: ICD-10-CM

## 2021-09-14 DIAGNOSIS — F31.9 BIPOLAR AFFECTIVE DISORDER, REMISSION STATUS UNSPECIFIED (H): ICD-10-CM

## 2021-09-14 DIAGNOSIS — G31.84 MILD NEUROCOGNITIVE DISORDER: Primary | ICD-10-CM

## 2021-09-14 DIAGNOSIS — F43.23 ADJUSTMENT DISORDER WITH MIXED ANXIETY AND DEPRESSED MOOD: ICD-10-CM

## 2021-09-14 NOTE — LETTER
9/14/2021         RE: Concepción De  [address]  [facility] [address]        Dear Colleague,    Thank you for referring your patient, Concepción De, to the [facility]. Please see a copy of my visit note below.      NEUROPSYCHOLOGY EVALUATION  [facility]      NAME: Concepción De    YOB: 1955   AGE: 66 year old  EDU: 16  DATE OF EVALUATION: 9/14/2021    REASON FOR REFERRAL:  Ms. De is a 66 year old, right-handed,  female with history of bipolar disorder, paroxysmal atrial fibrillation, and prediabetes. She has experienced significant frustration with word-finding for the past several years, which prompted her initial evaluation with neuropsychology in 2019. At that time, she exhibited subtle fluctuations in working memory and processing speed (with mostly low average performances), mildly impaired phonemic fluency and problem-solving, and moderate deficits in verbal memory with limited benefit from cuing on recognition testing. All of her language processing abilities were within normal limits, aside from phonemic fluency. The neuropsychologist suspected that much of these mild deficits could be attributed to longstanding bipolar disorder, but an incipient cortical dementia (e.g., Alzheimer's) could not be fully ruled out. Since that evaluation, the patient observed progressive expressive language issues. 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 Her neurologist (Dr. Richardson Choudhury) discussed this diagnosis with her, and referred her for this neuropsychological re-evaluation in order to clarify diagnosis, monitor her cognitive status, and assist with care planning.     SUMMARY OF FINDINGS: (please refer to Extended Report below for full details and comprehensive clinical history)  Due to the ongoing  COVID-19 pandemic, this assessment was conducted using PPE worn by the examiner and a face-mask for the patient. The standard administration of these tests involves direct face-to-face methods. The full impact of applying non-standard administration methods with PPE is not fully appreciated at this time. As such, the diagnostic conclusions and recommendations for treatment provided in this report are being advanced with caution.    With these limitations in mind, results of testing indicate that Ms. De is of estimated average premorbid intellectual functioning, and most of Ms. De's performances are generally commensurate with that estimate. However, she exhibits moderate impairment in phonemic fluency, along with relative weaknesses (i.e., below average scores) in cognitive inhibition and nonverbal memory retrieval. In addition, significant variability is observed on measures of processing speed (ranging from mildly impaired to average). More subtle variability is seen in the patient's verbal memory scores, which range from below average to low average. With regard to her verbal memory performances more specifically, the contextual (story) format seems to support her memory much better, whereas the lengthy word list is more difficult for her to remember. She benefits at least slightly from prompts/cues on recognition testing (more so for the story memory tasks). As mentioned above, there is also evidence of a nonverbal memory retrieval weakness, as evidenced by below average free recall that is significantly aided by prompts/cues on recognition testing. Overall, the current memory profile appears more consistent with subtle prefrontal dysfunction (as opposed to an amnestic disorder due to hippocampal compromise).    All other cognitive test performances are considered intact, including those that measure attention/concentration, visuospatial/constructional abilities, all other language functions  (including semantic fluency, word reading, word knowledge, sentence comprehension, and confrontation naming), and all other executive functions (I.e., novel problem-solving, abstract reasoning, mental flexibility/set-shifting, and planning).    Emotionally, the patient endorses moderate symptoms of depression and anxiety on self-report questionnaires. This is consistent with her reports during the clinical interview.     Compared to previous testing in 2019, nearly all of Ms. De's performances have either remained stable or have significantly improved. Specifically, significant improvements are observed on several measures of processing speed, as well as on a test of novel problem-solving. In addition, some aspects of her verbal memory have either slightly or significantly improved. In contrast, decline is observed on a test of nonverbal memory, suggesting that her retrieval deficit for nonverbal material has become more pronounced over time. Her self-reported anxiety symptoms have also worsened, from being rated as mild in 2019 to moderate in severity at present.    IMPRESSIONS:    Overall, the current deficits continue to support subtle frontal dysfunction, likely secondary to the neurologic effects of longstanding bipolar disorder which is associated with diminished volume of the frontal lobe (particularly as one ages).     In addition, significant stress, anxiety, and depressed mood may exacerbate her cognitive difficulties to a degree.    There is NO compelling evidence of a neurodegenerative syndrome at this time. More specifically, there is no support for a primary progressive aphasia (PPA) diagnosis, as nearly all of her language processing abilities are fully intact AND have remained stable since 2019. She does not meet criteria for any of the specific PPA subtypes (including nonfluent, semantic, or logopenic subtypes) at this time. I suspect that her perceived decline in her expressive language  abilities is more so related to increased stress and anxiety in recent years as she is the primary caregiver for her mother who has dementia and depression. This is further supported by the patient and her friend's reports that her word-finding issues (while frequent) are not always present and seem to get worse at the end of the day, likely when the patient is more tired and overwhelmed. This intermittent pattern of word-finding trouble would not be fully consistent with PPA, which would cause more persistent expressive language issues.    There is also no evidence of an emerging Alzheimer's disease process given her current memory profile and intact confrontation naming and semantic fluency scores, all of which have also remained stable (if not improved) since 2019.    I suspect that improvements in the patient's stress level will likely elicit perceived improvements in at least some of her cognitive abilities over time.    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 Ms. De reported that she will be seeing a new neurologist at [facility]. Their initial consult is scheduled for 9/27/2021.     2) Ongoing mental health treatment is strongly encouraged:    She has been seeing a psychotherapist for over 20 years, which remains appropriate. She may consider increasing the frequency of sessions, particularly now while she is under more stress. She is encouraged to discuss this with her provider at their next appointment.     An adjustment to her current psychotropic medication regimen could also be considered as she experiences heightened distress. This recommendation will be deferred to her treating psychiatrist, Dr. Coates.    I will direct Ms. De to some care giving  resources through the Alzheimer's Association ((http://www.alz.org/mnnd or 1-678.412.2395), which may help her cope with caregiver burden.    Relaxation strategies and self-care strategies will be discussed with Ms. De during our feedback appointment, and handouts will be provided.    3) From a cognitive standpoint, I have no concerns about the patient's ability to independently perform complex daily activities at this time. Her current living situation also remains appropriate, and I see no need for her to move into a more supportive environment (e.g., assisted living) any time in the near future.    4) The patient is encouraged to utilize cognitive compensatory strategies in daily life, including utilizing note pads, checklists, to-do lists, a calendar/planner, labeled alarm reminders, a GPS, a pillbox, and maintaining a daily morning and nighttime routine and an organized living environment.    5) Ms. De is strongly encouraged to adhere closely to her medication regimen to help keep her cerebrovascular risk factors (e.g., atrial fibrillation, prediabetes, etc.) well controlled. This may help to prevent future cognitive decline.    6) Ms. De is encouraged to remain physically, socially, and mentally active in order to optimize her brain health.    7) Neuropsychological follow-up is not clearly indicated at this time given the stability of her test scores over the past 2 years. That said, the current test data can be used as an updated baseline to which future comparisons can be made as clinically indicated.      FEEDBACK OF ASSESSMENT RESULTS:  Ms. De has requested to receive the results of this evaluation via a formal feedback appointment with me, which will be scheduled at the patient's convenience, typically within two weeks of today's date.     Thank you for allowing me to participate in Ms. De's care. Please contact me with any questions regarding the content of this  report.        Tosha Randle PsyD, DENIS  Licensed Clinical Neuropsychologist  [facility] [address], [address]  Phone: [phone]          --------------------------------EXTENDED REPORT--------------------------------    HISTORY OF PRESENTING PROBLEM:  The following information was obtained via medical record review and by interview of the patient and her longtime friend of 35 years, Vikki.    Per medical record review, Ms. De reported having issues with expressive language abilities dating back to 2017 or 2018. Specifically, she would often struggle to find words and efficiently articulate her thoughts. She also reported mixing up syllables of words and word reversals, and that her reading ability declined. She met with neurologist, Richardson Choudhury MD, for a consult in February 2018, and he subsequently ordered a brain MRI and laboratory tests. All returned unremarkable. Dr. Choudhury subsequently suspected that her word-finding troubles could be stress related. However, when she returned for follow up in December 2019, she reported that her word-finding difficulties had declined even further. Dr. Choudhury observed occasional word-finding pauses but other times her speech was noted to be fluent.     This apparent decline prompted a referral for a neuropsychological evaluation, which was initially performed by Tory Nation, PhD, on 12/19/2019. Results of testing at that time revealed subtle fluctuations in working memory and processing speed (with mostly low average performances), mildly impaired phonemic fluency and problem-solving, and moderate deficits in verbal memory with limited benefit from cuing on recognition testing. A comprehensive evaluation of her recepetive and expressive language abilities was performed during that evaluation, and all of her language processing abilities were within normal limits (aside from the aforementioned phonemic  "fluency). The neuropsychologist suspected that much of these mild frontal deficits could be attributed to longstanding bipolar disorder, but an incipient cortical dementia (e.g., Alzheimer's) could not be fully ruled out given the apparent subtle encoding deficit on memory testing.     Since that evaluation, the patient observed ongoing decline in her expressive language abilities. Dr. 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 Dr. Choudhury discussed this diagnosis with her during their appointment on 7/7/2020, and referred her for this neuropsychological re-evaluation in order to clarify diagnosis, monitor her cognitive status, and assist with care planning. Unfortunately, this evaluation was delayed due to the pandemic.    CURRENT CLINICAL INTERVIEW:  Currently, Ms. De reported persistent expressive language issues. She stated that her words \"get stuck here and there\" and she continues to struggle to find words. These issues get worse as the day goes on, and she noted that this is quite frustrating and embarrassing for her. She cited examples of having difficulty ordering fast food, even when reading directly from the menu. She no longer has an interest in reading in general; Although she is able to read, she finds that she has difficulty focusing while reading and often has to re-read passages multiple times. She noted that she also misspells words more frequently, and will type the first 1-2 letters incorrectly. Comprehension issues were denied. Vikki corroborated these reports, and also noted that the patient's speech was \"better than usual\" during our appointment today.    In addition to expressive language issues, the patient also reported feeling more forgetful at times. For example, she recently forgot which button to press on her phone (she noted that she " "\"blanked out\" while trying to figure it out). This also happens on occasion when she is using the microwave. She also cited a time when she was driving her mother somewhere and forgot where she was going. Her mother has dementia and was unable to remind her, but the patient was able to recall their destination about 3 minutes later on her own. This has only happened once while driving. The patient also reported that she occasionally forgets what she has already told her friends. Vikki stated that the patient repeats herself \"a little\" but attributed that to having several friends with whom she talks and forgetting who she told what. Vikki also reported that the patient buys things that she already has at home.     With regard to the activities of daily living, Ms. De reported that she is fully independent. She continues to reside alone in her own home. She continues to drive without issue. Vikki noted that the patient is \"extremely cautious\" while driving, but she feels completely safe with her behind the wheel. Ms. De manages her own medications and finances without any problems. She has never been much of a cook. She continues to perform household chores and errands without issue. Vikki corroborated these reports.    MEDICAL HISTORY:  Ms. De's medical history is significant for the following:    Past Medical History:   Diagnosis Date     Bipolar affective (H)      Depressive disorder      GERD (gastroesophageal reflux disease)      Obesity      Paroxysmal atrial fibrillation (H)      Pre-diabetes      In addition, the patient has been experiencing recent intermittent issues with her eyes lately (occasional double vision, \"jiggling\" of her eyeball, and right eye ptosis), and Dr. Choudhury suspected that this may be due to mild diabetic third nerve palsy that will resolve with time. She also reported occasional twitching of her thumb and eye. Ms. De has also experienced increased balance " "issues over the past year, and Dr. Choudhury observed her gait to be wide-based with positive Romberg on 7/15/2021. He also noted markedly diminished sensation in her feet but with spared reflexes (\"myelopathy vs. Neuropathy\"). She denied any recent falls.     Today, she reported that she occasionally feels lightheaded or dizzy. She also reported that she has a history of numerous syncopal episodes when she was a child. This syncopal episodes would come out of the blue and she would often hit her head during the fall. She has not had any of these syncopal episodes as an adult. Records additionally note a history of concussion with los of consciousness at age 15 when she struck a tree while skiing. She was seen by a physician and diagnosed with a concussion. Her symptoms fully resolved. Other history of significant head injuries was denied.    The patient denied any history of known seizure, stroke, heart attack, tremor and microsmia/anosmia.     The patient reported that her sleep is disrupted by ruminative anxiety and worries. Specifically, she is \"scared\" that her smoke alarm will go off, as she apparently has a history of living in an apartment or town house in which the smoke alarms would go off frequently and startle her. Vikki corroborated this, noting that there were times in which her smoke alarm would go off for unknown reasons a few times a day. The patient noted that she also sleeps with the TV on, which helps relax her, but sometimes she will wake up to the TV and start watching it or play on her computer in the middle of the night. She also awakens to her cat early in the morning, but she generally goes back to sleep after she feeds the cat. She estimates that she is typically getting a total of 6-7 hours of sleep per night and reported that she feels well rested in the morning. She noted that she occasionally dozes off during the day. Known symptoms of ARIN or parasomnias were denied.    Past Surgical " "History:   Procedure Laterality Date     APPENDECTOMY       BREAST CYST EXCISION Left     High school age     ENT SURGERY       GASTROPLASTY VERTICAL BANDED       GI SURGERY       GYN SURGERY       RADICAL HYSTERECTOMY Bilateral 2003     Per medical records, Ms. De previously underwent a Jass-en-Y that had to be reversed (almost immediately) due to complications.    Diagnostic studies:  Most recent brain MRI dated 5/20/2021 revealed mild generalized volume loss and presumed chronic small vessel ischemic changes in the white matter.     Of note, a previous MRI on 7/23/2020 revealed symmetric frontal, insular, and superolateral temporal lobe parenchymal volume loss, most prominent at the opercular/gennaro-insular region, unchanged from prior exam from 2/8/2018. This was noted to raise concern for \"frontotemporal lobar degeneration, specifically the progressive nonfluent aphasia variant.\" The mesial temporal lobes and entorhinal cortices were described as normal.     Current medications include (per medical record):   Current Outpatient Medications:      ARIPiprazole (ABILIFY) 5 MG tablet, Take 7.5 mg by mouth daily, Disp: , Rfl:      aspirin 81 MG EC tablet, Take 81 mg by mouth daily, Disp: , Rfl:      atenolol (TENORMIN) 25 MG tablet, Take 25 mg by mouth daily, Disp: , Rfl:      clonazePAM (KLONOPIN) 0.5 MG tablet, Take 0.5 mg by mouth 2 times daily as needed, Disp: , Rfl:      escitalopram (LEXAPRO) 20 MG tablet, Take 20 mg by mouth daily, Disp: , Rfl:      famotidine (PEPCID) 20 MG tablet, Take 20 mg by mouth (Patient not taking: Reported on 7/15/2021), Disp: , Rfl:      furosemide (LASIX) 20 MG tablet, TAKE 1/2 TO 1 TABLET BY MOUTH DAILY AS NEEDED FOR LOWER EXTREMITY SWELLING, Disp: , Rfl:      metFORMIN (GLUCOPHAGE) 500 MG tablet, Take 500 mg by mouth daily, Disp: , Rfl:      Multiple Vitamins-Minerals (PRESERVISION AREDS 2 PO), , Disp: , Rfl:      pantoprazole (PROTONIX) 20 MG EC tablet, Take 40 mg by mouth " "daily, Disp: , Rfl:      polyethylene glycol-propylene glycol (SYSTANE ULTRA) 0.4-0.3 % SOLN ophthalmic solution, Place 1 drop into both eyes every hour as needed for dry eyes, Disp: , Rfl:     RELEVANT FAMILY MEDICAL HISTORY:   Breast cancer (mother), depression with memory loss in late life (mother), atrial fibrillation (mother), Merkel cell carcinoma (father), cirrhosis (father), enlarged prostate (brother), Alzheimer's disease (maternal uncle), stroke (maternal grandfather), ovarian cancer (maternal grandmother), diabetes (paternal grandmother), dementia (paternal grandmother), childhood epilepsy (brother), left handedness (nephew).    PSYCHIATRIC HISTORY:  Per medical records, Ms. De was first diagnosed and treated for bipolar disorder at the age of 36. In the years leading up to her diagnosis, she was hospitalized several times for depression with suicidality. She estimates that she has been psychiatrically hospitalized 7-8 times in her life, most recently over 20 years ago. Throughout her 30's, she underwent 3 rounds of ECT, approximately 10 sessions each. She did find the treatment to be effective for her depression. She has not had any significant depressive or manic episodes for several years, as her mood has been relatively stable on Abilify, Lexapro, and Klonopin. She has been seeing the same psychiatrist (Dr. Coates) for about 20 years, and has been seeing the same psychotherapist for just as long. She finds psychotherapy to be helpful as well.    Currently, the patient described her mood as \"worried sick... I worry something terrible is happening to me.\" Vikki corroborated these reports, and added that Ms. De does seem more anxious lately. The patient went on to describe concern about the symptoms she has been having (including her language issues, balance issues, eye issues, etc.) and wonders if/when she should move into a more supportive environment if she truly has a dementia process. " "She also noted that she has been overwhelmed lately, as she is the primary caregiver for her mother who has dementia and lives alone. Although the patient's brother lives next door to their mother, the patient is the one who takes care of her. This has made her feel fairly \"angry\" and \"stuck,\" as she feels like she cannot go on vacation or do things she wants/needs to do because her mother relies largely on her. She goes to her mother's house 1-2 times per day, and formal in-home caregivers are there with her mother when she is not. The patient noted that her mother is also depressed and tends to be quite irritable with her, even though she is helping her out so much. This has taken an emotional toll on the patient. Fortunately, the patient's brother has been more involved over the past few of weeks, which has made a big difference in the patient's emotional wellbeing. She noted that her mood has been \"pretty good\" since he started helping out. That said, she acknowledged still having little interest in things she used to enjoy, such as knitting and reading. She spends much of her free time watching TV. She endorsed passive suicidal ideation (death sometimes sounds like a relief to her); however, she strongly denied any current suicidal intent or plan.    With regard to substance abuse, Ms. De denied history of past drug or alcohol abuse or dependence. She has never been a smoker. She does not currently use any alcohol or drugs.    SOCIAL HISTORY:  Per medical records, Ms. De was born and raised in [place]. She had one brother. 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 However, she was disabled from working due to mental health difficulties at the age of 36. She has never resumed working. Ms. De is currently single. She has no children. The patient lives alone in a town home in [place], [place].    TESTS " ADMINISTERED:   Wechsler Memory Scale-III (WMS-III) select subtests, Wechsler Adult Intelligence Scale-IV (WAIS-IV) select subtests, Wide Range Achievement Test-4 (WRAT-4) select subtests, Wechsler Memory Scale-IV (WMS-IV) select subtests, Brief Visuospatial Memory Test-Revised (BVMT-R),  [place] Verbal Learning Test-II (CVLT-II), Trailmaking Test (Trails A & B), Controlled Oral Word Association Test (COWAT) and Category Fluency, Edwards Naming Test-2 (BNT-2), FERNANDO Sentence Repetition subtest, BDAE Complex Ideational subtest, Joe-Osterrieth Complex Figure Test (RCFT), Clock Drawing Test, Stroop Color and Word Test, [place] Card Sorting Test-1 deck (WCST-64), Patient Health Questionnaire-9 (PHQ-9) and Generalized Anxiety Disorder-7 Assessment (THOMAS-7).    MOANS norms were used for COWAT & Category Fluency  Chun norms were used for BNT and Trail Making Test A & B     DESCRIPTIVE PERFORMANCE KEY:    Labels for tests with Normal Distributions  Score Label Standard Score %ile Rank   Exceptionally high score  > 130 > 98   Above average score 120-129 91-97   High average score 110-119 75-90   Average score  25-74   Low average score 80-89 9-24   Below average score 70-79 2-8   Exceptionally low score < 70 < 2     Labels for tests with Non-Normal Distributions  Score Label %ile Rank   Within normal expectations/ limits score (WNL) > 24   Low average score 9-24   Below average score 2-8   Exceptionally low score < 2     The following test results utilize score labels as adapted from Robbie Gutierrez, Napoleon Lopez, Daina Hatch, GRISELDA Giang, Dayami Silverio, Eron Haley & Conference Participatnts (2020): American Academy of Clinical Neuropsychology consensus conference statement on uniform labeling of performance test scores, The Clinical Neuropsychologist, DOI: [email or web address]. All scores contain some measure of error; scores are reported here as they are  obtained by the individual (without reference to the range of error). These are meant as labels and not interpretation of performance. While other relevant comments regarding task performance are provided below, please see the Summary, Impressions, and Diagnosis sections of this report for interpretation of the scores and the cognitive profile as a whole, including what does and does not constitute impairment for this particular individual.    COVID-19-ERA NEUROPSYCHOLOGY LIMITATIONS:  Due to the ongoing COVID-19 pandemic, this assessment was conducted with the examiner wearing [employer]-designated PPE and the patient wearing a face mask. The standard administration of these procedures involves direct face-to-face methods. The impact of applying non-standard administration methods has been evaluated only in part by scientific research. While every effort was made to simulate standard assessment practices, the diagnostic conclusions and recommendations for treatment provided in this report are being advanced with these limitations in mind.    BEHAVIORAL OBSERVATIONS:   Ms. De arrived on time and accompanied by her friend, Vikki, to today's appointment. She was appropriately dressed and groomed. She appeared alert and engaged. Gait and motor activity appeared normal. No vision or hearing difficulties were observed. Notably, she brought with her all of her relevant medical documents in a well organized manner (including her current medication list, the previous neuropsychological report, and a neat handwritten list of providers who need to receive my report with all of their contact information). Conversational speech was of normal rate, volume, and prosody. Only one word-finding pause was observed during the entire 70-minute interview, and no paraphasias or speech apraxias were noted. Her thought process appeared linear and goal-directed. No hallucinations or delusions were apparent. Judgment and insight  appeared intact. Her mood was euthymic yet mildly anxious, her affect was appropriately reactive. Rapport was easily established and eye contact was appropriate.     During the testing session, Ms. De was alert throughout. She was pleasant and cooperative throughout the evaluation. She did not request any breaks and no signs of fatigue were observed. She understood test instructions without difficulty. She continued to remain quite fluent in her expressive language as well, with no word-finding pauses or paraphasias. No frontal signs were observed behaviorally. Ms. De appeared adequately motivated and engaged easily during testing. Her score on an embedded measure of performance validity was in the valid range. Overall, the following results are considered a reasonably valid estimation of her current cognitive abilities.    OPTIMAL PREMORBID INTELLECT:  Optimal premorbid intellectual abilities were estimated as falling in the average range based on Ms. De's educational and occupational histories and performance on tasks least likely to be affected by acquired brain dysfunction.    SUMMARY OF TEST RESULTS:  ORIENTATION. Performance on a mental status exam, assessing orientation to personal and current information, resulted in a low average score. She was oriented to person, place, and date and was able to correctly name the current and previous presidents. She was only 26 minutes off of her time estimation.    ATTENTION/WORKING MEMORY. The patient's score on a measure sensitive to sustained auditory-verbal attention and concentration (WAIS-IV Digit Span) was classified as average, as she was able to recite up to 7 digits forward (an average score), up to 5 digits backward (an average score), and up to 4 digits in sequence (an average score).      PROCESSING SPEED. On the WAIS-IV Processing Speed Index, the patient's score was average (PSI = 100), with average speeded digit-symbol coding  "(Coding), and average speeded visual scanning/cancellation (Symbol Search). On a test of complex concentration that requires speeded numeric sequencing (Trails A), the patient's score was average for completion time and without error. On the Stroop test, speeded color naming was exceptionally low, as was speeded word reading.     LANGUAGE PROCESSING. Auditory-verbal comprehension of yes/no questions and stories was fully intact (BDAE Complex Ideational subtest). Her score on a subtest of word knowledge was average (WAIS-IV Vocabulary). Her ability to repeat increasingly lengthy and complex sentences was in the \"borderline\" range of functioning (FERNANDO Sentence Repetition); however, she is able to correctly repeat back most of the lengthier/more complex sentences at the end of the task (3 out of the last 5 items were without any error) .  Confrontation naming was measured as a within normal limits score (56/60; BNT-2). The patient's performance on a test of phonemic fluency resulted in an exceptionally low score (COWAT), while her semantic fluency was low average (Category Fluency). Her writing sample was intact and there was no evidence of micrographia.     VISUOSPATIAL/CONSTRUCTIONAL SKILLS. The WAIS-IV Perceptual Reasoning Index was measured as an above average score (pro-rated RAJEEV = 121), with high average nonverbal abstract reasoning (Matrix Reasoning), and above average visuoconstruction with three-dimensional blocks (Block Design). Copy of a complex geometric figure was within normal limits and well-organized in approach (RCFT Copy). On a Clock Drawing Task, the patient was able to draw a large, well-formed [place] with equally spaced and correctly placed numbers. Her clock hands converged nicely in the center of the clock face and were well-differentiated by length.      LEARNING/MEMORY. On the WMS-IV Logical Memory subtest, immediate memory for two paragraph-length stories resulted in a low average score. After " a 20-minute delay, the patient's score on the delayed recall trial was low average with a 68% retention rate. On the recognition trial, the patient's score was classified as average.    On a 16-item verbal list-learning task (CVLT-II Alternate Form), the patient acquired up to 11 words (69%) of the word list by the 5th and final learning trial (raw scores over trials = 5, 8, 9, 10, 11). Total learning acquisition was measured as an average score. Following a distractor list, the patient recalled 6 items on the original list, which was below average. The patient benefited only slightly from semantic cues (9 items; below average). After a 20-minute delay, the patient recalled 7 items, which was below average. She benefited only slightly from semantic cues at that point (9 items; below average). Recognition discriminability was measured as below average, as she recognized 11/16 items (exceptionally low) and made only 3 false-positive errors (low average).    On a visual memory measure (BVMT-R), immediate recall of six simple geometric figures over three learning trials was average (raw scores over trials = 5, 9, 6 out of 12). Delayed recall of the figures was below average, with a 56% retention rate (raw score = 5 out of 12). However, recognition discriminability was fully intact, as she correctly recognized 6/6 figures and made 0 false-positive errors.     EXECUTIVE FUNCTIONS. Concept identification and the ability to generate alternative problem solving strategies was within normal limits for age on the [place] Card Sorting Test. She completed 3 out of 3 categories (within normal limits) with a total of only 16 errors, which is average. Five of her errors were perseverative (a high average score) and there was 1 failure to maintain set. On a test of inhibition and cognitive flexibility, (Stroop Color-Word trial), the patient's score was below average for completion time but was without error. On a test that  requires speeded alpha-numeric sequencing/cognitive set-shifting (Trails B), performance was average and without error. Nonverbal abstract reasoning was high average (WAIS-IV Matrix Reasoning). Phonemic fluency was exceptionally low (COWAT). Performance on the Clock Drawing Test was intact, as she was able to accurately represent analog time.    MOOD. On the PHQ-9 a self report measure of depressive symptomatology, she obtained a score of 10, placing her in the range of moderate depression. She denied suicidal ideation. On the THOMAS-7, a self-report measure of anxiety, she obtained a score of 11,  placing her in the range of moderate anxiety.    ____________________________________________________________________________________    SERVICES PROVIDED & TIME:  On-site Office Visit Details   A clinical interview/neurobehavioral status examination was conducted with the patient and documented. I thoroughly reviewed the medical record, selected the neuropsychological test battery, administered and scored all of the neuropsychological tests (2+ tests), interpreted/integrated patient data and test results, and engaged in clinical decision making, treatment planning, report writing/preparation and provision of interactive feedback of test results on 9/27/2021. Please see below for a breakdown of time spent and the associated codes billed for these services.  Services   Time Spent  CPT Codes   Neurobehavioral Status Exam:  (e.g., clinical interview and neurobehavioral status exam, interpretation, report)   85 minutes   1 x 96116     Neuropsychological Evaluation Services:   (e.g., integration, interpretation, treatment planning, clinical decision making, feedback via telehealth)   262 minutes   1 x 96132  3 x 96133   Neuropsychological Testing by Psychologist:  (e.g., test administration, scoring, 2+ tests administered)   227 minutes   1 x 96136  7 x 96137     For diagnostic and coding purposes, Ms. De has a history of  bipolar disorder, paroxysmal atrial fibrillation, and prediabetes. 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 Please note, all charges are filed at the completion of the Episode of Care and associated with the final encounter date (feedback session on 9/27/2021).           Again, thank you for allowing me to participate in the care of your patient.        Sincerely,        Loco Diez.BOB, LP

## 2021-09-20 NOTE — PROGRESS NOTES
NEUROPSYCHOLOGY EVALUATION  [facility]      NAME: Concepción De    YOB: 1955   AGE: 66 year old  EDU: 16  DATE OF EVALUATION: 9/14/2021    REASON FOR REFERRAL:  Ms. De is a 66 year old, right-handed,  female with history of bipolar disorder, paroxysmal atrial fibrillation, and prediabetes. She has experienced significant frustration with word-finding for the past several years, which prompted her initial evaluation with neuropsychology in 2019. At that time, she exhibited subtle fluctuations in working memory and processing speed (with mostly low average performances), mildly impaired phonemic fluency and problem-solving, and moderate deficits in verbal memory with limited benefit from cuing on recognition testing. All of her language processing abilities were within normal limits, aside from phonemic fluency. The neuropsychologist suspected that much of these mild deficits could be attributed to longstanding bipolar disorder, but an incipient cortical dementia (e.g., Alzheimer's) could not be fully ruled out. Since that evaluation, the patient observed progressive expressive language issues. 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 Her neurologist (Dr. Richardson Choudhury) discussed this diagnosis with her, and referred her for this neuropsychological re-evaluation in order to clarify diagnosis, monitor her cognitive status, and assist with care planning.     SUMMARY OF FINDINGS: (please refer to Extended Report below for full details and comprehensive clinical history)  Due to the ongoing COVID-19 pandemic, this assessment was conducted using PPE worn by the examiner and a face-mask for the patient. The standard administration of these tests involves direct face-to-face methods. The full impact of applying non-standard administration methods with PPE is not  fully appreciated at this time. As such, the diagnostic conclusions and recommendations for treatment provided in this report are being advanced with caution.    With these limitations in mind, results of testing indicate that Ms. De is of estimated average premorbid intellectual functioning, and most of Ms. De's performances are generally commensurate with that estimate. However, she exhibits moderate impairment in phonemic fluency, along with relative weaknesses (i.e., below average scores) in cognitive inhibition and nonverbal memory retrieval. In addition, significant variability is observed on measures of processing speed (ranging from mildly impaired to average). More subtle variability is seen in the patient's verbal memory scores, which range from below average to low average. With regard to her verbal memory performances more specifically, the contextual (story) format seems to support her memory much better, whereas the lengthy word list is more difficult for her to remember. She benefits at least slightly from prompts/cues on recognition testing (more so for the story memory tasks). As mentioned above, there is also evidence of a nonverbal memory retrieval weakness, as evidenced by below average free recall that is significantly aided by prompts/cues on recognition testing. Overall, the current memory profile appears more consistent with subtle prefrontal dysfunction (as opposed to an amnestic disorder due to hippocampal compromise).    All other cognitive test performances are considered intact, including those that measure attention/concentration, visuospatial/constructional abilities, all other language functions (including semantic fluency, word reading, word knowledge, sentence comprehension, and confrontation naming), and all other executive functions (I.e., novel problem-solving, abstract reasoning, mental flexibility/set-shifting, and planning).    Emotionally, the patient endorses  moderate symptoms of depression and anxiety on self-report questionnaires. This is consistent with her reports during the clinical interview.     Compared to previous testing in 2019, nearly all of Ms. De's performances have either remained stable or have significantly improved. Specifically, significant improvements are observed on several measures of processing speed, as well as on a test of novel problem-solving. In addition, some aspects of her verbal memory have either slightly or significantly improved. In contrast, decline is observed on a test of nonverbal memory, suggesting that her retrieval deficit for nonverbal material has become more pronounced over time. Her self-reported anxiety symptoms have also worsened, from being rated as mild in 2019 to moderate in severity at present.    IMPRESSIONS:    Overall, the current deficits continue to support subtle frontal dysfunction, likely secondary to the neurologic effects of longstanding bipolar disorder which is associated with diminished volume of the frontal lobe (particularly as one ages).     In addition, significant stress, anxiety, and depressed mood may exacerbate her cognitive difficulties to a degree.    There is NO compelling evidence of a neurodegenerative syndrome at this time. More specifically, there is no support for a primary progressive aphasia (PPA) diagnosis, as nearly all of her language processing abilities are fully intact AND have remained stable since 2019. She does not meet criteria for any of the specific PPA subtypes (including nonfluent, semantic, or logopenic subtypes) at this time. I suspect that her perceived decline in her expressive language abilities is more so related to increased stress and anxiety in recent years as she is the primary caregiver for her mother who has dementia and depression. This is further supported by the patient and her friend's reports that her word-finding issues (while frequent) are not  always present and seem to get worse at the end of the day, likely when the patient is more tired and overwhelmed. This intermittent pattern of word-finding trouble would not be fully consistent with PPA, which would cause more persistent expressive language issues.    There is also no evidence of an emerging Alzheimer's disease process given her current memory profile and intact confrontation naming and semantic fluency scores, all of which have also remained stable (if not improved) since 2019.    I suspect that improvements in the patient's stress level will likely elicit perceived improvements in at least some of her cognitive abilities over time.    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 Ms. De reported that she will be seeing a new neurologist at [facility]. Their initial consult is scheduled for 9/27/2021.     2) Ongoing mental health treatment is strongly encouraged:    She has been seeing a psychotherapist for over 20 years, which remains appropriate. She may consider increasing the frequency of sessions, particularly now while she is under more stress. She is encouraged to discuss this with her provider at their next appointment.     An adjustment to her current psychotropic medication regimen could also be considered as she experiences heightened distress. This recommendation will be deferred to her treating psychiatrist, Dr. Coates.    I will direct Ms. De to some care giving resources through the Alzheimer[employer] ((http://www.alz.org/mnnd or 1-709.219.4949), which may help her cope with caregiver burden.    Relaxation strategies and self-care strategies will be discussed with Ms. De during our feedback appointment, and handouts will  be provided.    3) From a cognitive standpoint, I have no concerns about the patient's ability to independently perform complex daily activities at this time. Her current living situation also remains appropriate, and I see no need for her to move into a more supportive environment (e.g., assisted living) any time in the near future.    4) The patient is encouraged to utilize cognitive compensatory strategies in daily life, including utilizing note pads, checklists, to-do lists, a calendar/planner, labeled alarm reminders, a GPS, a pillbox, and maintaining a daily morning and nighttime routine and an organized living environment.    5) Ms. De is strongly encouraged to adhere closely to her medication regimen to help keep her cerebrovascular risk factors (e.g., atrial fibrillation, prediabetes, etc.) well controlled. This may help to prevent future cognitive decline.    6) Ms. De is encouraged to remain physically, socially, and mentally active in order to optimize her brain health.    7) Neuropsychological follow-up is not clearly indicated at this time given the stability of her test scores over the past 2 years. That said, the current test data can be used as an updated baseline to which future comparisons can be made as clinically indicated.      FEEDBACK OF ASSESSMENT RESULTS:  Ms. De has requested to receive the results of this evaluation via a formal feedback appointment with me, which will be scheduled at the patient's convenience, typically within two weeks of today's date.     Thank you for allowing me to participate in Ms. De's care. Please contact me with any questions regarding the content of this report.        Tosha Randle PsyD, LP  Licensed Clinical Neuropsychologist  [facility] [address], [address]  Phone: [phone]          --------------------------------EXTENDED REPORT--------------------------------    HISTORY OF  PRESENTING PROBLEM:  The following information was obtained via medical record review and by interview of the patient and her longtime friend of 35 years, Vikki.    Per medical record review, Ms. De reported having issues with expressive language abilities dating back to 2017 or 2018. Specifically, she would often struggle to find words and efficiently articulate her thoughts. She also reported mixing up syllables of words and word reversals, and that her reading ability declined. She met with neurologist, Richardson Choudhury MD, for a consult in February 2018, and he subsequently ordered a brain MRI and laboratory tests. All returned unremarkable. Dr. Choudhury subsequently suspected that her word-finding troubles could be stress related. However, when she returned for follow up in December 2019, she reported that her word-finding difficulties had declined even further. Dr. Choudhury observed occasional word-finding pauses but other times her speech was noted to be fluent.     This apparent decline prompted a referral for a neuropsychological evaluation, which was initially performed by Tory Nation, PhD, on 12/19/2019. Results of testing at that time revealed subtle fluctuations in working memory and processing speed (with mostly low average performances), mildly impaired phonemic fluency and problem-solving, and moderate deficits in verbal memory with limited benefit from cuing on recognition testing. A comprehensive evaluation of her recepetive and expressive language abilities was performed during that evaluation, and all of her language processing abilities were within normal limits (aside from the aforementioned phonemic fluency). The neuropsychologist suspected that much of these mild frontal deficits could be attributed to longstanding bipolar disorder, but an incipient cortical dementia (e.g., Alzheimer's) could not be fully ruled out given the apparent subtle encoding deficit on memory testing.  "    Since that evaluation, the patient observed ongoing decline in her expressive language abilities. Dr. 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 Dr. Choudhury discussed this diagnosis with her during their appointment on 7/7/2020, and referred her for this neuropsychological re-evaluation in order to clarify diagnosis, monitor her cognitive status, and assist with care planning. Unfortunately, this evaluation was delayed due to the pandemic.    CURRENT CLINICAL INTERVIEW:  Currently, Ms. De reported persistent expressive language issues. She stated that her words \"get stuck here and there\" and she continues to struggle to find words. These issues get worse as the day goes on, and she noted that this is quite frustrating and embarrassing for her. She cited examples of having difficulty ordering fast food, even when reading directly from the menu. She no longer has an interest in reading in general; Although she is able to read, she finds that she has difficulty focusing while reading and often has to re-read passages multiple times. She noted that she also misspells words more frequently, and will type the first 1-2 letters incorrectly. Comprehension issues were denied. Vikki corroborated these reports, and also noted that the patient's speech was \"better than usual\" during our appointment today.    In addition to expressive language issues, the patient also reported feeling more forgetful at times. For example, she recently forgot which button to press on her phone (she noted that she \"blanked out\" while trying to figure it out). This also happens on occasion when she is using the microwave. She also cited a time when she was driving her mother somewhere and forgot where she was going. Her mother has dementia and was unable to remind her, but the patient was able to " "recall their destination about 3 minutes later on her own. This has only happened once while driving. The patient also reported that she occasionally forgets what she has already told her friends. Vikki stated that the patient repeats herself \"a little\" but attributed that to having several friends with whom she talks and forgetting who she told what. Vikki also reported that the patient buys things that she already has at home.     With regard to the activities of daily living, Ms. De reported that she is fully independent. She continues to reside alone in her own home. She continues to drive without issue. Vikki noted that the patient is \"extremely cautious\" while driving, but she feels completely safe with her behind the wheel. Ms. De manages her own medications and finances without any problems. She has never been much of a cook. She continues to perform household chores and errands without issue. Vikki corroborated these reports.    MEDICAL HISTORY:  Ms. De's medical history is significant for the following:    Past Medical History:   Diagnosis Date     Bipolar affective (H)      Depressive disorder      GERD (gastroesophageal reflux disease)      Obesity      Paroxysmal atrial fibrillation (H)      Pre-diabetes      In addition, the patient has been experiencing recent intermittent issues with her eyes lately (occasional double vision, \"jiggling\" of her eyeball, and right eye ptosis), and Dr. Choudhury suspected that this may be due to mild diabetic third nerve palsy that will resolve with time. She also reported occasional twitching of her thumb and eye. Ms. De has also experienced increased balance issues over the past year, and Dr. Choudhury observed her gait to be wide-based with positive Romberg on 7/15/2021. He also noted markedly diminished sensation in her feet but with spared reflexes (\"myelopathy vs. Neuropathy\"). She denied any recent falls.     Today, she reported that she " "occasionally feels lightheaded or dizzy. She also reported that she has a history of numerous syncopal episodes when she was a child. This syncopal episodes would come out of the blue and she would often hit her head during the fall. She has not had any of these syncopal episodes as an adult. Records additionally note a history of concussion with los of consciousness at age 15 when she struck a tree while skiing. She was seen by a physician and diagnosed with a concussion. Her symptoms fully resolved. Other history of significant head injuries was denied.    The patient denied any history of known seizure, stroke, heart attack, tremor and microsmia/anosmia.     The patient reported that her sleep is disrupted by ruminative anxiety and worries. Specifically, she is \"scared\" that her smoke alarm will go off, as she apparently has a history of living in an apartment or town house in which the smoke alarms would go off frequently and startle her. Vikki corroborated this, noting that there were times in which her smoke alarm would go off for unknown reasons a few times a day. The patient noted that she also sleeps with the TV on, which helps relax her, but sometimes she will wake up to the TV and start watching it or play on her computer in the middle of the night. She also awakens to her cat early in the morning, but she generally goes back to sleep after she feeds the cat. She estimates that she is typically getting a total of 6-7 hours of sleep per night and reported that she feels well rested in the morning. She noted that she occasionally dozes off during the day. Known symptoms of ARIN or parasomnias were denied.    Past Surgical History:   Procedure Laterality Date     APPENDECTOMY       BREAST CYST EXCISION Left     High school age     ENT SURGERY       GASTROPLASTY VERTICAL BANDED       GI SURGERY       GYN SURGERY       RADICAL HYSTERECTOMY Bilateral 2003     Per medical records, Ms. De previously " "underwent a Jass-en-Y that had to be reversed (almost immediately) due to complications.    Diagnostic studies:  Most recent brain MRI dated 5/20/2021 revealed mild generalized volume loss and presumed chronic small vessel ischemic changes in the white matter.     Of note, a previous MRI on 7/23/2020 revealed symmetric frontal, insular, and superolateral temporal lobe parenchymal volume loss, most prominent at the opercular/gennaro-insular region, unchanged from prior exam from 2/8/2018. This was noted to raise concern for \"frontotemporal lobar degeneration, specifically the progressive nonfluent aphasia variant.\" The mesial temporal lobes and entorhinal cortices were described as normal.     Current medications include (per medical record):   Current Outpatient Medications:      ARIPiprazole (ABILIFY) 5 MG tablet, Take 7.5 mg by mouth daily, Disp: , Rfl:      aspirin 81 MG EC tablet, Take 81 mg by mouth daily, Disp: , Rfl:      atenolol (TENORMIN) 25 MG tablet, Take 25 mg by mouth daily, Disp: , Rfl:      clonazePAM (KLONOPIN) 0.5 MG tablet, Take 0.5 mg by mouth 2 times daily as needed, Disp: , Rfl:      escitalopram (LEXAPRO) 20 MG tablet, Take 20 mg by mouth daily, Disp: , Rfl:      famotidine (PEPCID) 20 MG tablet, Take 20 mg by mouth (Patient not taking: Reported on 7/15/2021), Disp: , Rfl:      furosemide (LASIX) 20 MG tablet, TAKE 1/2 TO 1 TABLET BY MOUTH DAILY AS NEEDED FOR LOWER EXTREMITY SWELLING, Disp: , Rfl:      metFORMIN (GLUCOPHAGE) 500 MG tablet, Take 500 mg by mouth daily, Disp: , Rfl:      Multiple Vitamins-Minerals (PRESERVISION AREDS 2 PO), , Disp: , Rfl:      pantoprazole (PROTONIX) 20 MG EC tablet, Take 40 mg by mouth daily, Disp: , Rfl:      polyethylene glycol-propylene glycol (SYSTANE ULTRA) 0.4-0.3 % SOLN ophthalmic solution, Place 1 drop into both eyes every hour as needed for dry eyes, Disp: , Rfl:     RELEVANT FAMILY MEDICAL HISTORY:   Breast cancer (mother), depression with memory loss in " "late life (mother), atrial fibrillation (mother), Merkel cell carcinoma (father), cirrhosis (father), enlarged prostate (brother), Alzheimer's disease (maternal uncle), stroke (maternal grandfather), ovarian cancer (maternal grandmother), diabetes (paternal grandmother), dementia (paternal grandmother), childhood epilepsy (brother), left handedness (nephew).    PSYCHIATRIC HISTORY:  Per medical records, Ms. De was first diagnosed and treated for bipolar disorder at the age of 36. In the years leading up to her diagnosis, she was hospitalized several times for depression with suicidality. She estimates that she has been psychiatrically hospitalized 7-8 times in her life, most recently over 20 years ago. Throughout her 30's, she underwent 3 rounds of ECT, approximately 10 sessions each. She did find the treatment to be effective for her depression. She has not had any significant depressive or manic episodes for several years, as her mood has been relatively stable on Abilify, Lexapro, and Klonopin. She has been seeing the same psychiatrist (Dr. Coates) for about 20 years, and has been seeing the same psychotherapist for just as long. She finds psychotherapy to be helpful as well.    Currently, the patient described her mood as \"worried sick... I worry something terrible is happening to me.\" Vikki corroborated these reports, and added that Ms. De does seem more anxious lately. The patient went on to describe concern about the symptoms she has been having (including her language issues, balance issues, eye issues, etc.) and wonders if/when she should move into a more supportive environment if she truly has a dementia process. She also noted that she has been overwhelmed lately, as she is the primary caregiver for her mother who has dementia and lives alone. Although the patient's brother lives next door to their mother, the patient is the one who takes care of her. This has made her feel fairly \"angry\" and " "\"stuck,\" as she feels like she cannot go on vacation or do things she wants/needs to do because her mother relies largely on her. She goes to her mother's house 1-2 times per day, and formal in-home caregivers are there with her mother when she is not. The patient noted that her mother is also depressed and tends to be quite irritable with her, even though she is helping her out so much. This has taken an emotional toll on the patient. Fortunately, the patient's brother has been more involved over the past few of weeks, which has made a big difference in the patient's emotional wellbeing. She noted that her mood has been \"pretty good\" since he started helping out. That said, she acknowledged still having little interest in things she used to enjoy, such as knitting and reading. She spends much of her free time watching TV. She endorsed passive suicidal ideation (death sometimes sounds like a relief to her); however, she strongly denied any current suicidal intent or plan.    With regard to substance abuse, Ms. De denied history of past drug or alcohol abuse or dependence. She has never been a smoker. She does not currently use any alcohol or drugs.    SOCIAL HISTORY:  Per medical records, Ms. De was born and raised in [place]. She had one brother. 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 However, she was disabled from working due to mental health difficulties at the age of 36. She has never resumed working. Ms. De is currently single. She has no children. The patient lives alone in a town home in [place], [place].    TESTS ADMINISTERED:   Wechsler Memory Scale-III (WMS-III) select subtests, Wechsler Adult Intelligence Scale-IV (WAIS-IV) select subtests, Wide Range Achievement Test-4 (WRAT-4) select subtests, Wechsler Memory Scale-IV (WMS-IV) select subtests, Brief Visuospatial Memory Test-Revised (BVMT-R),  " [place] Verbal Learning Test-II (CVLT-II), Trailmaking Test (Trails A & B), Controlled Oral Word Association Test (COWAT) and Category Fluency, [place] Naming Test-2 (BNT-2), FERNANDO Sentence Repetition subtest, BDAE Complex Ideational subtest, Joe-Osterrieth Complex Figure Test (RCFT), Clock Drawing Test, Stroop Color and Word Test, [place] Card Sorting Test-1 deck (WCST-64), Patient Health Questionnaire-9 (PHQ-9) and Generalized Anxiety Disorder-7 Assessment (THOMAS-7).    MOANS norms were used for COWAT & Category Fluency  Chun norms were used for BNT and Trail Making Test A & B     DESCRIPTIVE PERFORMANCE KEY:    Labels for tests with Normal Distributions  Score Label Standard Score %ile Rank   Exceptionally high score  > 130 > 98   Above average score 120-129 91-97   High average score 110-119 75-90   Average score  25-74   Low average score 80-89 9-24   Below average score 70-79 2-8   Exceptionally low score < 70 < 2     Labels for tests with Non-Normal Distributions  Score Label %ile Rank   Within normal expectations/ limits score (WNL) > 24   Low average score 9-24   Below average score 2-8   Exceptionally low score < 2     The following test results utilize score labels as adapted from Robbie Gutierrez, Napoleon Lopez, Daina Hatch, GRISELDA Giang, Ata Kaye Michael Westerveld & Conference Participatnts (2020): American Academy of Clinical Neuropsychology consensus conference statement on uniform labeling of performance test scores, The Clinical Neuropsychologist, DOI: 10.1080/[email or web address]. All scores contain some measure of error; scores are reported here as they are obtained by the individual (without reference to the range of error). These are meant as labels and not interpretation of performance. While other relevant comments regarding task performance are provided below, please see the Summary, Impressions, and Diagnosis sections of this report  for interpretation of the scores and the cognitive profile as a whole, including what does and does not constitute impairment for this particular individual.    COVID-19-ERA NEUROPSYCHOLOGY LIMITATIONS:  Due to the ongoing COVID-19 pandemic, this assessment was conducted with the examiner wearing [employer]-designated PPE and the patient wearing a face mask. The standard administration of these procedures involves direct face-to-face methods. The impact of applying non-standard administration methods has been evaluated only in part by scientific research. While every effort was made to simulate standard assessment practices, the diagnostic conclusions and recommendations for treatment provided in this report are being advanced with these limitations in mind.    BEHAVIORAL OBSERVATIONS:   Ms. De arrived on time and accompanied by her friend, Vikki, to today's appointment. She was appropriately dressed and groomed. She appeared alert and engaged. Gait and motor activity appeared normal. No vision or hearing difficulties were observed. Notably, she brought with her all of her relevant medical documents in a well organized manner (including her current medication list, the previous neuropsychological report, and a neat handwritten list of providers who need to receive my report with all of their contact information). Conversational speech was of normal rate, volume, and prosody. Only one word-finding pause was observed during the entire 70-minute interview, and no paraphasias or speech apraxias were noted. Her thought process appeared linear and goal-directed. No hallucinations or delusions were apparent. Judgment and insight appeared intact. Her mood was euthymic yet mildly anxious, her affect was appropriately reactive. Rapport was easily established and eye contact was appropriate.     During the testing session, Ms. De was alert throughout. She was pleasant and cooperative throughout the  evaluation. She did not request any breaks and no signs of fatigue were observed. She understood test instructions without difficulty. She continued to remain quite fluent in her expressive language as well, with no word-finding pauses or paraphasias. No frontal signs were observed behaviorally. Ms. De appeared adequately motivated and engaged easily during testing. Her score on an embedded measure of performance validity was in the valid range. Overall, the following results are considered a reasonably valid estimation of her current cognitive abilities.    OPTIMAL PREMORBID INTELLECT:  Optimal premorbid intellectual abilities were estimated as falling in the average range based on Ms. De's educational and occupational histories and performance on tasks least likely to be affected by acquired brain dysfunction.    SUMMARY OF TEST RESULTS:  ORIENTATION. Performance on a mental status exam, assessing orientation to personal and current information, resulted in a low average score. She was oriented to person, place, and date and was able to correctly name the current and previous presidents. She was only 26 minutes off of her time estimation.    ATTENTION/WORKING MEMORY. The patient's score on a measure sensitive to sustained auditory-verbal attention and concentration (WAIS-IV Digit Span) was classified as average, as she was able to recite up to 7 digits forward (an average score), up to 5 digits backward (an average score), and up to 4 digits in sequence (an average score).      PROCESSING SPEED. On the WAIS-IV Processing Speed Index, the patient's score was average (PSI = 100), with average speeded digit-symbol coding (Coding), and average speeded visual scanning/cancellation (Symbol Search). On a test of complex concentration that requires speeded numeric sequencing (Trails A), the patient's score was average for completion time and without error. On the Stroop test, speeded color naming was  "exceptionally low, as was speeded word reading.     LANGUAGE PROCESSING. Auditory-verbal comprehension of yes/no questions and stories was fully intact (BDAE Complex Ideational subtest). Her score on a subtest of word knowledge was average (WAIS-IV Vocabulary). Her ability to repeat increasingly lengthy and complex sentences was in the \"borderline\" range of functioning (FERNANDO Sentence Repetition); however, she is able to correctly repeat back most of the lengthier/more complex sentences at the end of the task (3 out of the last 5 items were without any error) .  Confrontation naming was measured as a within normal limits score (56/60; BNT-2). The patient's performance on a test of phonemic fluency resulted in an exceptionally low score (COWAT), while her semantic fluency was low average (Category Fluency). Her writing sample was intact and there was no evidence of micrographia.     VISUOSPATIAL/CONSTRUCTIONAL SKILLS. The WAIS-IV Perceptual Reasoning Index was measured as an above average score (pro-rated RAJEEV = 121), with high average nonverbal abstract reasoning (Matrix Reasoning), and above average visuoconstruction with three-dimensional blocks (Block Design). Copy of a complex geometric figure was within normal limits and well-organized in approach (RCFT Copy). On a Clock Drawing Task, the patient was able to draw a large, well-formed Que[place] with equally spaced and correctly placed numbers. Her clock hands converged nicely in the center of the clock face and were well-differentiated by length.      LEARNING/MEMORY. On the WMS-IV Logical Memory subtest, immediate memory for two paragraph-length stories resulted in a low average score. After a 20-minute delay, the patient's score on the delayed recall trial was low average with a 68% retention rate. On the recognition trial, the patient's score was classified as average.    On a 16-item verbal list-learning task (CVLT-II Alternate Form), the patient acquired up to 11 " words (69%) of the word list by the 5th and final learning trial (raw scores over trials = 5, 8, 9, 10, 11). Total learning acquisition was measured as an average score. Following a distractor list, the patient recalled 6 items on the original list, which was below average. The patient benefited only slightly from semantic cues (9 items; below average). After a 20-minute delay, the patient recalled 7 items, which was below average. She benefited only slightly from semantic cues at that point (9 items; below average). Recognition discriminability was measured as below average, as she recognized 11/16 items (exceptionally low) and made only 3 false-positive errors (low average).    On a visual memory measure (BVMT-R), immediate recall of six simple geometric figures over three learning trials was average (raw scores over trials = 5, 9, 6 out of 12). Delayed recall of the figures was below average, with a 56% retention rate (raw score = 5 out of 12). However, recognition discriminability was fully intact, as she correctly recognized 6/6 figures and made 0 false-positive errors.     EXECUTIVE FUNCTIONS. Concept identification and the ability to generate alternative problem solving strategies was within normal limits for age on the [place] Card Sorting Test. She completed 3 out of 3 categories (within normal limits) with a total of only 16 errors, which is average. Five of her errors were perseverative (a high average score) and there was 1 failure to maintain set. On a test of inhibition and cognitive flexibility, (Stroop Color-Word trial), the patient's score was below average for completion time but was without error. On a test that requires speeded alpha-numeric sequencing/cognitive set-shifting (Trails B), performance was average and without error. Nonverbal abstract reasoning was high average (WAIS-IV Matrix Reasoning). Phonemic fluency was exceptionally low (COWAT). Performance on the Clock Drawing Test was  intact, as she was able to accurately represent analog time.    MOOD. On the PHQ-9 a self report measure of depressive symptomatology, she obtained a score of 10, placing her in the range of moderate depression. She denied suicidal ideation. On the THOMAS-7, a self-report measure of anxiety, she obtained a score of 11,  placing her in the range of moderate anxiety.    ____________________________________________________________________________________    SERVICES PROVIDED & TIME:  On-site Office Visit Details   A clinical interview/neurobehavioral status examination was conducted with the patient and documented. I thoroughly reviewed the medical record, selected the neuropsychological test battery, administered and scored all of the neuropsychological tests (2+ tests), interpreted/integrated patient data and test results, and engaged in clinical decision making, treatment planning, report writing/preparation and provision of interactive feedback of test results on 9/27/2021. Please see below for a breakdown of time spent and the associated codes billed for these services.  Services   Time Spent  CPT Codes   Neurobehavioral Status Exam:  (e.g., clinical interview and neurobehavioral status exam, interpretation, report)   85 minutes   1 x 96116     Neuropsychological Evaluation Services:   (e.g., integration, interpretation, treatment planning, clinical decision making, feedback via telehealth)   262 minutes   1 x 96132  3 x 96133   Neuropsychological Testing by Psychologist:  (e.g., test administration, scoring, 2+ tests administered)   227 minutes   1 x 96136  7 x 96137     For diagnostic and coding purposes, Ms. De has a history of bipolar disorder, paroxysmal atrial fibrillation, and prediabetes. 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 Please note, all charges are filed at the completion of the Episode of Care and associated with the final encounter date (feedback session on  9/27/2021).

## 2021-09-21 ENCOUNTER — TRANSFERRED RECORDS (OUTPATIENT)
Dept: HEALTH INFORMATION MANAGEMENT | Facility: CLINIC | Age: 66
End: 2021-09-21

## 2021-09-27 ENCOUNTER — VIRTUAL VISIT (OUTPATIENT)
Dept: NEUROLOGY | Facility: CLINIC | Age: 66
End: 2021-09-27
Payer: COMMERCIAL

## 2021-09-27 DIAGNOSIS — F43.23 ADJUSTMENT DISORDER WITH MIXED ANXIETY AND DEPRESSED MOOD: ICD-10-CM

## 2021-09-27 DIAGNOSIS — F31.9 BIPOLAR AFFECTIVE DISORDER, REMISSION STATUS UNSPECIFIED (H): ICD-10-CM

## 2021-09-27 DIAGNOSIS — G31.84 MILD NEUROCOGNITIVE DISORDER: Primary | ICD-10-CM

## 2021-09-27 PROCEDURE — 96116 NUBHVL XM PHYS/QHP 1ST HR: CPT | Performed by: CLINICAL NEUROPSYCHOLOGIST

## 2021-09-27 PROCEDURE — 96132 NRPSYC TST EVAL PHYS/QHP 1ST: CPT | Mod: GT | Performed by: CLINICAL NEUROPSYCHOLOGIST

## 2021-09-27 PROCEDURE — 96137 PSYCL/NRPSYC TST PHY/QHP EA: CPT | Performed by: CLINICAL NEUROPSYCHOLOGIST

## 2021-09-27 PROCEDURE — 96133 NRPSYC TST EVAL PHYS/QHP EA: CPT | Mod: GT | Performed by: CLINICAL NEUROPSYCHOLOGIST

## 2021-09-27 PROCEDURE — 96136 PSYCL/NRPSYC TST PHY/QHP 1ST: CPT | Performed by: CLINICAL NEUROPSYCHOLOGIST

## 2021-09-27 NOTE — PROGRESS NOTES
"NEUROPSYCHOLOGY TELE-HEALTH FEEDBACK VISIT  [facility]      Video Visit  Concepción De is a 66 year old female who is being evaluated via a billable video visit.      The patient has been notified of the following:     \"This video visit will be conducted via a call between you and your provider. We have found that certain health care needs can be provided without the need for an in-person physical exam.  This service lets us provide the care you need with a video conversation. If during the course of the call the physician/provider feels a video visit is not appropriate, you will not be charged for this service.\"    Patient has given verbal consent to a Video visit? Yes  Consent has been obtained for this service by 1 care team member: yes.    Patient would like the video invitation sent by: Text to cell phone: [phone] ([employer])    Visit Summary  The purpose of today s appointment was to provide feedback regarding Ms. De recent neuropsychological consult completed on 9/14/2021. Her friend, Vikki, joined us for today's visit with the patient's permission. We began the session by discussing her experience during the evaluation. I provided Ms. De with detailed feedback regarding her performance on cognitive testing and her pattern of cognitive strengths and weaknesses.  I discussed my overall impressions and recommendations and provided the opportunity for Ms. De to ask any questions that she had about the evaluation. At the end of the session, she indicated that she understood the results and that I had answered all of her questions.       Tosha Randle PsyD, LP  Licensed Clinical Neuropsychologist  [facility] [address], [address]  [place] [address]  Phone: [phone]    Video-Visit Details    Type of service:  Video Visit  Start Time: 9:32 AM  End Time: 10:45 AM    Originating Location (pt. Location): " Home    Distant Location (provider location):  Remote work for [facility]    Mode of Communication:  Video Conference via [employer]    For diagnostic and coding purposes, Ms. De was referred for an evaluation of Mild Neurocognitive Disorder. As this is the final date for this Episode of Care (initiated on 9/14/2021) all charges for the entire Episode of Care will be filed today. Please see the 9/14/2021 evaluation for a detailed description of codes and services, including services provided today.      In brief:   1 x 96116  1 x 96132  3 x 96133  1 x 96136  7 x 96137

## 2021-09-27 NOTE — LETTER
"    9/27/2021         RE: Concepción De  [address]  [place] [address]        Dear Colleague,    Thank you for referring your patient, Concepción De, to the [facility]. Please see a copy of my visit note below.    NEUROPSYCHOLOGY TELE-HEALTH FEEDBACK VISIT  [facility]      Video Visit  Concepción De is a 66 year old female who is being evaluated via a billable video visit.      The patient has been notified of the following:     \"This video visit will be conducted via a call between you and your provider. We have found that certain health care needs can be provided without the need for an in-person physical exam.  This service lets us provide the care you need with a video conversation. If during the course of the call the physician/provider feels a video visit is not appropriate, you will not be charged for this service.\"    Patient has given verbal consent to a Video visit? Yes  Consent has been obtained for this service by 1 care team member: yes.    Patient would like the video invitation sent by: Text to cell phone: [phone] ([employer])    Visit Summary  The purpose of today s appointment was to provide feedback regarding Ms. De recent neuropsychological consult completed on 9/14/2021. Her friend, Vikki, joined us for today's visit with the patient's permission. We began the session by discussing her experience during the evaluation. I provided Ms. De with detailed feedback regarding her performance on cognitive testing and her pattern of cognitive strengths and weaknesses.  I discussed my overall impressions and recommendations and provided the opportunity for Ms. De to ask any questions that she had about the evaluation. At the end of the session, she indicated that she understood the results and that I had answered all of her questions.       Tosha Randle PsyD, LP  Licensed Clinical " Neuropsychologist  [facility]  [address], [address]  [place] [address]  Phone: [phone]    Video-Visit Details    Type of service:  Video Visit  Start Time: 9:32 AM  End Time: 10:45 AM    Originating Location (pt. Location): Home    Distant Location (provider location):  Remote work for [facility]    Mode of Communication:  Video Conference via [employer]    For diagnostic and coding purposes, Ms. De was referred for an evaluation of Mild Neurocognitive Disorder. As this is the final date for this Episode of Care (initiated on 9/14/2021) all charges for the entire Episode of Care will be filed today. Please see the 9/14/2021 evaluation for a detailed description of codes and services, including services provided today.      In brief:   1 x 96116  1 x 96132  3 x 96133  1 x 96136  7 x 96137        Again, thank you for allowing me to participate in the care of your patient.        Sincerely,        Loco Diez.BOB, LP

## 2021-09-27 NOTE — PATIENT INSTRUCTIONS
SUMMARY OF FINDINGS:   Due to the ongoing COVID-19 pandemic, this assessment was conducted using PPE worn by the examiner and a face-mask for the patient. The standard administration of these tests involves direct face-to-face methods. The full impact of applying non-standard administration methods with PPE is not fully appreciated at this time. As such, the diagnostic conclusions and recommendations for treatment provided in this report are being advanced with caution.    With these limitations in mind, results of testing indicate that Ms. De is of estimated average premorbid intellectual functioning, and most of Ms. De's performances are generally commensurate with that estimate. However, she exhibits moderate impairment in phonemic fluency, along with relative weaknesses (i.e., below average scores) in cognitive inhibition and nonverbal memory retrieval. In addition, significant variability is observed on measures of processing speed (ranging from mildly impaired to average). More subtle variability is seen in the patient's verbal memory scores, which range from below average to low average. With regard to her verbal memory performances more specifically, the contextual (story) format seems to support her memory much better, whereas the lengthy word list is more difficult for her to remember. She benefits at least slightly from prompts/cues on recognition testing (more so for the story memory tasks). As mentioned above, there is also evidence of a nonverbal memory retrieval weakness, as evidenced by below average free recall that is significantly aided by prompts/cues on recognition testing. Overall, the current memory profile appears more consistent with subtle prefrontal dysfunction (as opposed to an amnestic disorder due to hippocampal compromise).    All other cognitive test performances are considered intact, including those that measure attention/concentration, visuospatial/constructional  abilities, all other language functions (including semantic fluency, word reading, word knowledge, sentence comprehension, and confrontation naming), and all other executive functions (I.e., novel problem-solving, abstract reasoning, mental flexibility/set-shifting, and planning).    Emotionally, the patient endorses moderate symptoms of depression and anxiety on self-report questionnaires. This is consistent with her reports during the clinical interview.     Compared to previous testing in 2019, nearly all of Ms. De's performances have either remained stable or have significantly improved. Specifically, significant improvements are observed on several measures of processing speed, as well as on a test of novel problem-solving. In addition, some aspects of her verbal memory have either slightly or significantly improved. In contrast, decline is observed on a test of nonverbal memory, suggesting that her retrieval deficit for nonverbal material has become more pronounced over time. Her self-reported anxiety symptoms have also worsened, from being rated as mild in 2019 to moderate in severity at present.    IMPRESSIONS:    Overall, the current deficits continue to support subtle frontal dysfunction, likely secondary to the neurologic effects of longstanding bipolar disorder which is associated with diminished volume of the frontal lobe (particularly as one ages).     In addition, significant stress, anxiety, and depressed mood may exacerbate her cognitive difficulties to a degree.    There is NO compelling evidence of a neurodegenerative syndrome at this time. More specifically, there is no support for a primary progressive aphasia (PPA) diagnosis, as nearly all of her language processing abilities are fully intact AND have remained stable since 2019. She does not meet criteria for any of the specific PPA subtypes (including nonfluent, semantic, or logopenic subtypes) at this time. I suspect that her  perceived decline in her expressive language abilities is more so related to increased stress and anxiety in recent years as she is the primary caregiver for her mother who has dementia and depression. This is further supported by the patient and her friend's reports that her word-finding issues (while frequent) are not always present and seem to get worse at the end of the day, likely when the patient is more tired and overwhelmed. This intermittent pattern of word-finding trouble would not be fully consistent with PPA, which would cause more persistent expressive language issues.    There is also no evidence of an emerging Alzheimer's disease process given her current memory profile and intact confrontation naming and semantic fluency scores, all of which have also remained stable (if not improved) since 2019.    I suspect that improvements in the patient's stress level will likely elicit perceived improvements in at least some of her cognitive abilities over time.    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 Ms. De reported that she will be seeing a new neurologist at [facility]. Their initial consult is scheduled for 9/27/2021.     2) Ongoing mental health treatment is strongly encouraged:    She has been seeing a psychotherapist for over 20 years, which remains appropriate. She may consider increasing the frequency of sessions, particularly now while she is under more stress. She is encouraged to discuss this with her provider at their next appointment.     An adjustment to her current psychotropic medication regimen could also be considered as she experiences heightened distress. This recommendation will be deferred to her treating psychiatrist, Dr. Coates.    I will direct Ms.  Kenisha to some care giving resources through the [employer] ((http://www.alz.org/mnnd or 1-922.927.4730), which may help her cope with caregiver burden.    Relaxation strategies and self-care strategies will be discussed with Ms. De during our feedback appointment, and handouts will be provided.    3) From a cognitive standpoint, I have no concerns about the patient's ability to independently perform complex daily activities at this time. Her current living situation also remains appropriate, and I see no need for her to move into a more supportive environment (e.g., assisted living) any time in the near future.    4) The patient is encouraged to utilize cognitive compensatory strategies in daily life, including utilizing note pads, checklists, to-do lists, a calendar/planner, labeled alarm reminders, a GPS, a pillbox, and maintaining a daily morning and nighttime routine and an organized living environment.    5) Ms. De is strongly encouraged to adhere closely to her medication regimen to help keep her cerebrovascular risk factors (e.g., atrial fibrillation, prediabetes, etc.) well controlled. This may help to prevent future cognitive decline.    6) Ms. De is encouraged to remain physically, socially, and mentally active in order to optimize her brain health.    7) Neuropsychological follow-up is not clearly indicated at this time given the stability of her test scores over the past 2 years. That said, the current test data can be used as an updated baseline to which future comparisons can be made as clinically indicated.    Thank you for allowing me to participate in your care. Please contact me with any questions regarding the content of this report.        Tosha Randle PsyD,   Licensed Clinical Neuropsychologist  [facility] [address], [address]  Phone: [phone]      Cognitive Strategies    Take notes! Keep a small  notepad with you in your purse/pocket/bag and write things down that you want to remember. You might also keep a notepad in a convenient location in your home (e.g., next to your telephone or calendar). Taking your notes in a note pad (instead of on multiple post-it notes) might help you stay organized, and you will also remember where to go to look for the notes that you took.    Create checklists, grocery lists, and to-do lists. Cross things off as you finish them.    Use a calendar or planner and get in the habit of checking it daily. Make it a part of your morning and/or nighttime routine to remind yourself of upcoming events, activities, or appointments. Cross the days off as they pass so that you can quickly glance at the calendar to know what day it is and what you have going on.    Set alarm reminders. For example, you can set an alarm to remind you to take your medications, turn off the oven, turn off the sprinkler outside, or to start getting ready for your appointment. If you use a smart phone, often times you can label the alarm that goes off so that you know what the alarm is for when it chimes.    Use a pillbox to follow your medication regimen. A pillbox acts as a nice visual cue to remind us to take our medications. If you have trouble remembering whether or not you have already taken your medications today, a pillbox can be extremely helpful to help with this issue.    Use a GPS when driving to help you stay on route.    When having an important conversation or completing an important task, reduce as many distractions in the environment as you can. For example, turn off the TV or the music in the background. Turn off or silence your cell phone. Clear your work area of everything that you do not need for the task at hand.    Establish set locations for certain items. For example, if you keep your keys on a hook by your door, you will always know where to go to look for them.     Maintain a daily  routine, especially for morning and nighttime tasks.    Relaxation Techniques    Search on YouTube and follow along with the videos:  1) Diaphragmatic (deep) Breathing  2) Progressive Muscle Relaxation  3) Guided Imagery/Visualization  4) Meditation  5) Mindfulness activities    Free Relaxation Apps (5-10 minute guided relaxation audios/videos):  1) Calm  2) Head Space  3) Virtual Hope Box    Other Relaxing Activities:  1) Get some fresh air  2) Exercise/go for a walk  3) Take a warm bath  4) Adult coloring books  5) Relaxing music  6) Other activities you enjoy...

## 2021-10-19 PROBLEM — F32.9 MAJOR DEPRESSION: Status: ACTIVE | Noted: 2018-08-28

## 2021-12-13 ENCOUNTER — TRANSFERRED RECORDS (OUTPATIENT)
Dept: HEALTH INFORMATION MANAGEMENT | Facility: CLINIC | Age: 66
End: 2021-12-13
Payer: COMMERCIAL

## 2022-08-08 ENCOUNTER — TRANSFERRED RECORDS (OUTPATIENT)
Dept: HEALTH INFORMATION MANAGEMENT | Facility: CLINIC | Age: 67
End: 2022-08-08

## 2022-10-03 ENCOUNTER — ANCILLARY PROCEDURE (OUTPATIENT)
Dept: MAMMOGRAPHY | Facility: CLINIC | Age: 67
End: 2022-10-03
Attending: INTERNAL MEDICINE
Payer: COMMERCIAL

## 2022-10-03 DIAGNOSIS — Z12.31 SCREENING MAMMOGRAM FOR BREAST CANCER: ICD-10-CM

## 2022-10-03 PROCEDURE — 77067 SCR MAMMO BI INCL CAD: CPT

## 2022-11-15 ENCOUNTER — APPOINTMENT (OUTPATIENT)
Dept: ULTRASOUND IMAGING | Facility: HOSPITAL | Age: 67
End: 2022-11-15
Attending: STUDENT IN AN ORGANIZED HEALTH CARE EDUCATION/TRAINING PROGRAM
Payer: COMMERCIAL

## 2022-11-15 ENCOUNTER — HOSPITAL ENCOUNTER (EMERGENCY)
Facility: HOSPITAL | Age: 67
Discharge: HOME | End: 2022-11-15
Attending: EMERGENCY MEDICINE | Admitting: EMERGENCY MEDICINE
Payer: COMMERCIAL

## 2022-11-15 ENCOUNTER — APPOINTMENT (OUTPATIENT)
Dept: CT IMAGING | Facility: HOSPITAL | Age: 67
End: 2022-11-15
Attending: EMERGENCY MEDICINE
Payer: COMMERCIAL

## 2022-11-15 VITALS
SYSTOLIC BLOOD PRESSURE: 148 MMHG | HEART RATE: 62 BPM | WEIGHT: 293 LBS | BODY MASS INDEX: 45.99 KG/M2 | DIASTOLIC BLOOD PRESSURE: 60 MMHG | TEMPERATURE: 97.7 F | OXYGEN SATURATION: 100 % | HEIGHT: 67 IN | RESPIRATION RATE: 18 BRPM

## 2022-11-15 DIAGNOSIS — R10.11 POSTPRANDIAL RUQ PAIN: ICD-10-CM

## 2022-11-15 LAB
ALBUMIN SERPL BCG-MCNC: 4.2 G/DL (ref 3.5–5.2)
ALBUMIN UR-MCNC: NEGATIVE MG/DL
ALP SERPL-CCNC: 124 U/L (ref 35–104)
ALT SERPL W P-5'-P-CCNC: 11 U/L (ref 10–35)
ANION GAP SERPL CALCULATED.3IONS-SCNC: 10 MMOL/L (ref 7–15)
APPEARANCE UR: CLEAR
AST SERPL W P-5'-P-CCNC: 12 U/L (ref 10–35)
BASOPHILS # BLD AUTO: 0.1 10E3/UL (ref 0–0.2)
BASOPHILS NFR BLD AUTO: 1 %
BILIRUB SERPL-MCNC: 0.3 MG/DL
BILIRUB UR QL STRIP: NEGATIVE
BUN SERPL-MCNC: 7.7 MG/DL (ref 8–23)
CALCIUM SERPL-MCNC: 9.1 MG/DL (ref 8.8–10.2)
CHLORIDE SERPL-SCNC: 101 MMOL/L (ref 98–107)
COLOR UR AUTO: COLORLESS
CREAT SERPL-MCNC: 0.72 MG/DL (ref 0.51–0.95)
DEPRECATED HCO3 PLAS-SCNC: 28 MMOL/L (ref 22–29)
EOSINOPHIL # BLD AUTO: 0.1 10E3/UL (ref 0–0.7)
EOSINOPHIL NFR BLD AUTO: 1 %
ERYTHROCYTE [DISTWIDTH] IN BLOOD BY AUTOMATED COUNT: 16.6 % (ref 10–15)
GFR SERPL CREATININE-BSD FRML MDRD: >90 ML/MIN/1.73M2
GLUCOSE SERPL-MCNC: 135 MG/DL (ref 70–99)
GLUCOSE UR STRIP-MCNC: NEGATIVE MG/DL
HCT VFR BLD AUTO: 42 % (ref 35–47)
HGB BLD-MCNC: 12.9 G/DL (ref 11.7–15.7)
HGB UR QL STRIP: NEGATIVE
IMM GRANULOCYTES # BLD: 0 10E3/UL
IMM GRANULOCYTES NFR BLD: 0 %
KETONES UR STRIP-MCNC: NEGATIVE MG/DL
LEUKOCYTE ESTERASE UR QL STRIP: NEGATIVE
LIPASE SERPL-CCNC: 47 U/L (ref 13–60)
LYMPHOCYTES # BLD AUTO: 2.8 10E3/UL (ref 0.8–5.3)
LYMPHOCYTES NFR BLD AUTO: 27 %
MCH RBC QN AUTO: 24 PG (ref 26.5–33)
MCHC RBC AUTO-ENTMCNC: 30.7 G/DL (ref 31.5–36.5)
MCV RBC AUTO: 78 FL (ref 78–100)
MONOCYTES # BLD AUTO: 1 10E3/UL (ref 0–1.3)
MONOCYTES NFR BLD AUTO: 9 %
NEUTROPHILS # BLD AUTO: 6.4 10E3/UL (ref 1.6–8.3)
NEUTROPHILS NFR BLD AUTO: 62 %
NITRATE UR QL: NEGATIVE
NRBC # BLD AUTO: 0 10E3/UL
NRBC BLD AUTO-RTO: 0 /100
PH UR STRIP: 5.5 [PH] (ref 5–7)
PLATELET # BLD AUTO: 271 10E3/UL (ref 150–450)
POTASSIUM SERPL-SCNC: 3.8 MMOL/L (ref 3.4–5.3)
PROT SERPL-MCNC: 7.5 G/DL (ref 6.4–8.3)
RBC # BLD AUTO: 5.38 10E6/UL (ref 3.8–5.2)
RBC URINE: 0 /HPF
SODIUM SERPL-SCNC: 139 MMOL/L (ref 136–145)
SP GR UR STRIP: 1.01 (ref 1–1.03)
TRANSITIONAL EPI: <1 /HPF
UROBILINOGEN UR STRIP-MCNC: <2 MG/DL
WBC # BLD AUTO: 10.3 10E3/UL (ref 4–11)
WBC URINE: 1 /HPF

## 2022-11-15 PROCEDURE — 76705 ECHO EXAM OF ABDOMEN: CPT

## 2022-11-15 PROCEDURE — 85025 COMPLETE CBC W/AUTO DIFF WBC: CPT | Performed by: EMERGENCY MEDICINE

## 2022-11-15 PROCEDURE — 80053 COMPREHEN METABOLIC PANEL: CPT | Performed by: EMERGENCY MEDICINE

## 2022-11-15 PROCEDURE — 81001 URINALYSIS AUTO W/SCOPE: CPT | Performed by: EMERGENCY MEDICINE

## 2022-11-15 PROCEDURE — 99285 EMERGENCY DEPT VISIT HI MDM: CPT | Mod: 25

## 2022-11-15 PROCEDURE — 36415 COLL VENOUS BLD VENIPUNCTURE: CPT | Performed by: EMERGENCY MEDICINE

## 2022-11-15 PROCEDURE — 83690 ASSAY OF LIPASE: CPT | Performed by: EMERGENCY MEDICINE

## 2022-11-15 PROCEDURE — 74176 CT ABD & PELVIS W/O CONTRAST: CPT

## 2022-11-15 RX ORDER — SUCRALFATE 1 G/1
1 TABLET ORAL 4 TIMES DAILY
Qty: 60 TABLET | Refills: 0 | Status: SHIPPED | OUTPATIENT
Start: 2022-11-15

## 2022-11-15 RX ORDER — FAMOTIDINE 20 MG/1
20 TABLET, FILM COATED ORAL 2 TIMES DAILY
Qty: 30 TABLET | Refills: 0 | Status: SHIPPED | OUTPATIENT
Start: 2022-11-15

## 2022-11-15 ASSESSMENT — ENCOUNTER SYMPTOMS
ARTHRALGIAS: 0
SHORTNESS OF BREATH: 0
FEVER: 0
DIFFICULTY URINATING: 0
HEADACHES: 0
COLOR CHANGE: 0
CONFUSION: 0
ABDOMINAL PAIN: 1
VOMITING: 0
EYE REDNESS: 0
NECK STIFFNESS: 0

## 2022-11-15 NOTE — ED TRIAGE NOTES
Patient arrives to triage from home with chief complaint of right upper abdominal and right flank pain which started on Sunday.  No history of kidney stones.  Endorses nausea, but denies vomiting.  Patient is alert and oriented x4.

## 2022-11-15 NOTE — ED NOTES
ED Triage Provider Note  [facility] EMERGENCY DEPARTMENT  Encounter Date: Nov 15, 2022    History:  Chief Complaint   Patient presents with     Abdominal Pain     Flank Pain     Concepción De is a 67 year old female who presents to the ED with right flank and right upper quadrant abdominal pain.  Onset in the last day.  Pain rated 5 out of 10.  No associated symptoms.    Review of Systems:  Patient complaining of right flank pain and right upper quadrant abdominal pain.  Otherwise review systems negative.    Exam:  Current blood pressure 140/60, heart rate 68.  General: No acute distress. Appears stated age.   Cardio: Normal heart rate.    Medical Decision Making:  Patient arriving to the ED with problem as above. A medical screening exam was performed.  Differential diagnosis includes urinary tract infection, pyelonephritis, kidney stones, gallbladder disease or liver disease.  Multiple other possibilities are possible.    4:25 PM.  Orders initiated from Triage. The patient is most appropriate to return to the waiting room.       Gustavo Groves MD  11/15/2022 at 4:23 PM     Gustavo Groves MD  11/15/22 1625

## 2022-11-16 ENCOUNTER — DOCUMENTATION ONLY (OUTPATIENT)
Dept: OTHER | Facility: CLINIC | Age: 67
End: 2022-11-16

## 2022-11-16 NOTE — ED PROVIDER NOTES
EMERGENCY DEPARTMENT ENCOUNTER      NAME: Concepción De  AGE: 67 year old female  YOB: 1955  MRN: [id number]  EVALUATION DATE & TIME: No admission date for patient encounter.    PCP: Keya Dailey    ED PROVIDER: Cesar Jones M.D.      Chief Complaint   Patient presents with     Abdominal Pain     Flank Pain         IMPRESSION  1. Postprandial RUQ pain        PLAN  - Pepcid & Carafate for home  - close PCP follow up  - discharge to home    ED COURSE & MEDICAL DECISION MAKING    ED Course as of 11/15/22 2215   Tue Nov 15, 2022   2023 67yoF with history of appendectomy, obesity (s/p gastric band), GERD presenting for evaluation of 3 days of postprandial burning upper abdominal pain; no vomiting. Called RN and directed to the ED. No shortness of breath, pleuritic or exertional symptoms.    Vitals unremarkable. No symptoms during exam with no abdominal tenderness, clear lungs, normal work of breathing, no CVA tenderness, no peripheral edema or calf tenderness, normal neuro exam.    Workup already obtained from triage and unremarkable with negative CT, negative US, normal bilirubin/LFTs/lipase, no BRENDA, no glaring electrolyte abnormality, no leukocytosis, no anemia, no UTI. Suspect gastritis/PUD; perhaps gallbladder dysfunction given postprandial pain. Ok for outpatient management. Already taking PPI; will add H2 blocker & Carafate for home. Patient able to tolerate PO and walk without difficulty; doubt referred intrathoracic etiology such as ACS. Patient comfortable with discharge at this time. Return precautions and need for PCP follow up discussed and understood. No further questions at the time of discharge.       --------------------------------------------------------------------------------   --------------------------------------------------------------------------------     8:07 PM I met with the patient for the initial interview and physical examination. Discussed plan for treatment  and workup in the ED.  8:22 PM We discussed the plan for discharge and the patient is agreeable. Reviewed supportive cares, symptomatic treatment, outpatient follow up, and reasons to return to the Emergency Department. All questions and concerns were addressed. Patient to be discharged by ED RN.        This patient involved a high degree of complexity in medical decision making, as significant risks were present and assessed.    Broad differential considered for this patient presenting, including but not limited to:  Gastritis, GERD, PUD, perforation, pancreatitis, acute biliary process, pyelo, UTI, sepsis, colitis, ACS, PE, aortic dissection, other referred intrathoracic etiology, acute aortic syndrome    I wore the following PPE during this patient encounter:  N95 mask, face shield w/ eye protection, gloves      MEDICATIONS GIVEN IN THE EMERGENCY DEPARTMENT  Medications - No data to display      NEW PRESCRIPTIONS STARTED AT TODAY'S ER VISIT  Discharge Medication List as of 11/15/2022  8:23 PM      START taking these medications    Details   sucralfate (CARAFATE) 1 GM tablet Take 1 tablet (1 g) by mouth 4 times daily, Disp-60 tablet, R-0, E-Prescribe         CONTINUE these medications which have CHANGED    Details   famotidine (PEPCID) 20 MG tablet Take 1 tablet (20 mg) by mouth 2 times daily, Disp-30 tablet, R-0, E-Prescribe         CONTINUE these medications which have NOT CHANGED    Details   ARIPiprazole (ABILIFY) 5 MG tablet Take 7.5 mg by mouth daily, Historical      aspirin 81 MG EC tablet Take 81 mg by mouth daily, Historical      atenolol (TENORMIN) 25 MG tablet Take 25 mg by mouth daily, Historical      clonazePAM (KLONOPIN) 0.5 MG tablet Take 0.5 mg by mouth 2 times daily as needed, Historical      escitalopram (LEXAPRO) 20 MG tablet Take 20 mg by mouth daily, Historical      furosemide (LASIX) 20 MG tablet TAKE 1/2 TO 1 TABLET BY MOUTH DAILY AS NEEDED FOR LOWER EXTREMITY SWELLING, Historical       metFORMIN (GLUCOPHAGE) 500 MG tablet Take 500 mg by mouth daily, Historical      Multiple Vitamins-Minerals (PRESERVISION AREDS 2 PO) Historical      pantoprazole (PROTONIX) 20 MG EC tablet Take 40 mg by mouth daily, Historical      polyethylene glycol-propylene glycol (SYSTANE ULTRA) 0.4-0.3 % SOLN ophthalmic solution Place 1 drop into both eyes every hour as needed for dry eyes, Historical                 =================================================================      HPI  Patient information was obtained from: patient     Use of : N/A         Concepción De is a 67 year old female with a pertinent history of afib, HLD, schizoaffective disorder, DMII, DVT, and s/p appendectomy and hysterectomy who presents to this ED via walk in for evaluation of abdominal pain.     Since 11/13/22, the patient reports developing upper right abdominal pain which is worse during and after eating. The patient takes 40mg Protonix daily and yesterday took 20mg of Pepcid in addition to Protonix, and reports it did not help the her symptoms. The patient denies any vomiting, shortness of breath, or any other complaints at this time.     REVIEW OF SYSTEMS   Review of Systems   Constitutional: Negative for fever.   HENT: Negative for congestion.    Eyes: Negative for redness.   Respiratory: Negative for shortness of breath.    Cardiovascular: Negative for chest pain.   Gastrointestinal: Positive for abdominal pain (right upper). Negative for vomiting.   Genitourinary: Negative for difficulty urinating.   Musculoskeletal: Negative for arthralgias and neck stiffness.   Skin: Negative for color change.   Neurological: Negative for headaches.   Psychiatric/Behavioral: Negative for confusion.   All other systems reviewed and are negative.    All other systems reviewed and are negative except as noted above in HPI.      --------------- MEDICAL HISTORY ---------------  PAST MEDICAL HISTORY:  Past Medical History:    Diagnosis Date     Bipolar affective (H)      Depressive disorder      GERD (gastroesophageal reflux disease)      Obesity      Paroxysmal atrial fibrillation (H)      Pre-diabetes      Patient Active Problem List   Diagnosis     Paroxysmal atrial fibrillation (H)     Deep vein thrombosis (DVT) of distal vein of lower extremity, unspecified chronicity, unspecified laterality (H)     Major depression in complete remission (H)     Morbid obesity (H)     Nonalcoholic fatty liver disease     Numbness of lower extremity     Osteoarthritis of multiple joints     Palpitations     Prediabetes     Word finding difficulty     Fronto-temporal dementia (H)     Abdominal adhesions     Anemia     Anxiety     Arthritis     Bipolar 1 disorder (H)     Depressive disorder     Frontotemporal lobar degeneration (H)     Atrial fibrillation (H)       PAST SURGICAL HISTORY:  Past Surgical History:   Procedure Laterality Date     APPENDECTOMY       BREAST CYST EXCISION Left     High school age     ENT SURGERY       GASTROPLASTY VERTICAL BANDED       GI SURGERY       GYN SURGERY       RADICAL HYSTERECTOMY Bilateral 2003       CURRENT MEDICATIONS:    No current facility-administered medications for this encounter.    Current Outpatient Medications:      famotidine (PEPCID) 20 MG tablet, Take 1 tablet (20 mg) by mouth 2 times daily, Disp: 30 tablet, Rfl: 0     sucralfate (CARAFATE) 1 GM tablet, Take 1 tablet (1 g) by mouth 4 times daily, Disp: 60 tablet, Rfl: 0     ARIPiprazole (ABILIFY) 5 MG tablet, Take 7.5 mg by mouth daily, Disp: , Rfl:      aspirin 81 MG EC tablet, Take 81 mg by mouth daily, Disp: , Rfl:      atenolol (TENORMIN) 25 MG tablet, Take 25 mg by mouth daily, Disp: , Rfl:      clonazePAM (KLONOPIN) 0.5 MG tablet, Take 0.5 mg by mouth 2 times daily as needed, Disp: , Rfl:      escitalopram (LEXAPRO) 20 MG tablet, Take 20 mg by mouth daily, Disp: , Rfl:      furosemide (LASIX) 20 MG tablet, TAKE 1/2 TO 1 TABLET BY MOUTH DAILY  "AS NEEDED FOR LOWER EXTREMITY SWELLING, Disp: , Rfl:      metFORMIN (GLUCOPHAGE) 500 MG tablet, Take 500 mg by mouth daily, Disp: , Rfl:      Multiple Vitamins-Minerals (PRESERVISION AREDS 2 PO), , Disp: , Rfl:      pantoprazole (PROTONIX) 20 MG EC tablet, Take 40 mg by mouth daily, Disp: , Rfl:      polyethylene glycol-propylene glycol (SYSTANE ULTRA) 0.4-0.3 % SOLN ophthalmic solution, Place 1 drop into both eyes every hour as needed for dry eyes, Disp: , Rfl:     ALLERGIES:  Allergies   Allergen Reactions     Clozapine Anaphylaxis     Ciprofloxacin Hives and Itching     Nitrofurantoin Other (See Comments) and Unknown     Flu per Patient  Flu per Patient  Flu per Patient       Pantoprazole Other (See Comments)     Extremity swelling, dry mouth     Sulfa Drugs      Sulfasalazine      Other reaction(s): *Unknown       FAMILY HISTORY:  Family History   Problem Relation Age of Onset     Cancer Mother      Cancer Father      Cirrhosis Father      Breast Cancer Mother 45.00     Ovarian Cancer Maternal Grandmother      SOCIAL HISTORY:   Social History     Socioeconomic History     Marital status: Single   Tobacco Use     Smoking status: Never     Smokeless tobacco: Never   Substance and Sexual Activity     Alcohol use: Not Currently     Drug use: Never     Sexual activity: Yes   Other Topics Concern     Parent/sibling w/ CABG, MI or angioplasty before 65F 55M? No       --------------- PHYSICAL EXAM ---------------  Nursing notes and vitals reviewed by me.  VITALS:  Vitals:    11/15/22 1624   BP: (!) 148/60   Pulse: 62   Resp: 18   Temp: 97.7  F (36.5  C)   TempSrc: Oral   SpO2: 100%   Weight: 138.1 kg (304 lb 8 oz)   Height: 1.702 m (5' 7\")       PHYSICAL EXAM:    General:  alert, interactive, no distress  Eyes:  conjunctivae clear, conjugate gaze  HENT:  atraumatic, nose with no rhinorrhea, oropharynx clear  Neck:  no meningismus  Cardiovascular:  HR 70s during exam, regular rhythm, no murmurs, brisk cap refill  Chest:  " no chest wall tenderness  Pulmonary:  no stridor, normal phonation, normal work of breathing, clear lungs bilaterally  Abdomen:  soft, nondistended, nontender   :  no CVA tenderness  Back:  no midline spinal tenderness  Musculoskeletal:  no pretibial edema, no calf tenderness. Gross ROM intact to joints of extremities with no obvious deformities.  Skin:  warm, dry, no rash  Neuro:  awake, alert, answers questions appropriately, follows commands, moves all limbs  Psych:  calm, normal affect      --------------- RESULTS ---------------  LAB:  Reviewed and interpreted by me.  Results for orders placed or performed during the hospital encounter of 11/15/22   CT Abdomen Pelvis w/o Contrast    Impression    IMPRESSION:   1.  No etiology for symptoms evident. No urinary tract stones. Nothing inflammatory involving bowel.     Abdomen US, limited (RUQ only)    Impression    IMPRESSION:  1.  Normal limited abdominal ultrasound.       UA with Microscopic reflex to Culture    Specimen: Urine, Midstream   Result Value Ref Range    Color Urine Colorless Colorless, Straw, Light Yellow, Yellow    Appearance Urine Clear Clear    Glucose Urine Negative Negative mg/dL    Bilirubin Urine Negative Negative    Ketones Urine Negative Negative mg/dL    Specific Gravity Urine 1.006 1.001 - 1.030    Blood Urine Negative Negative    pH Urine 5.5 5.0 - 7.0    Protein Albumin Urine Negative Negative mg/dL    Urobilinogen Urine <2.0 <2.0 mg/dL    Nitrite Urine Negative Negative    Leukocyte Esterase Urine Negative Negative    RBC Urine 0 <=2 /HPF    WBC Urine 1 <=5 /HPF    Transitional Epithelials Urine <1 <=1 /HPF   Comprehensive metabolic panel   Result Value Ref Range    Sodium 139 136 - 145 mmol/L    Potassium 3.8 3.4 - 5.3 mmol/L    Chloride 101 98 - 107 mmol/L    Carbon Dioxide (CO2) 28 22 - 29 mmol/L    Anion Gap 10 7 - 15 mmol/L    Urea Nitrogen 7.7 (L) 8.0 - 23.0 mg/dL    Creatinine 0.72 0.51 - 0.95 mg/dL    Calcium 9.1 8.8 - 10.2  mg/dL    Glucose 135 (H) 70 - 99 mg/dL    Alkaline Phosphatase 124 (H) 35 - 104 U/L    AST 12 10 - 35 U/L    ALT 11 10 - 35 U/L    Protein Total 7.5 6.4 - 8.3 g/dL    Albumin 4.2 3.5 - 5.2 g/dL    Bilirubin Total 0.3 <=1.2 mg/dL    GFR Estimate >90 >60 mL/min/1.73m2   Result Value Ref Range    Lipase 47 13 - 60 U/L   CBC with platelets and differential   Result Value Ref Range    WBC Count 10.3 4.0 - 11.0 10e3/uL    RBC Count 5.38 (H) 3.80 - 5.20 10e6/uL    Hemoglobin 12.9 11.7 - 15.7 g/dL    Hematocrit 42.0 35.0 - 47.0 %    MCV 78 78 - 100 fL    MCH 24.0 (L) 26.5 - 33.0 pg    MCHC 30.7 (L) 31.5 - 36.5 g/dL    RDW 16.6 (H) 10.0 - 15.0 %    Platelet Count 271 150 - 450 10e3/uL    % Neutrophils 62 %    % Lymphocytes 27 %    % Monocytes 9 %    % Eosinophils 1 %    % Basophils 1 %    % Immature Granulocytes 0 %    NRBCs per 100 WBC 0 <1 /100    Absolute Neutrophils 6.4 1.6 - 8.3 10e3/uL    Absolute Lymphocytes 2.8 0.8 - 5.3 10e3/uL    Absolute Monocytes 1.0 0.0 - 1.3 10e3/uL    Absolute Eosinophils 0.1 0.0 - 0.7 10e3/uL    Absolute Basophils 0.1 0.0 - 0.2 10e3/uL    Absolute Immature Granulocytes 0.0 <=0.4 10e3/uL    Absolute NRBCs 0.0 10e3/uL       RADIOLOGY:  Reviewed by me. Please see official radiology report.  Recent Results (from the past 24 hour(s))   CT Abdomen Pelvis w/o Contrast    Narrative    EXAM: CT ABDOMEN PELVIS W/O CONTRAST  LOCATION: [facility]  DATE/TIME: 11/15/2022 4:59 PM    INDICATION: Right flank pain  COMPARISON: 4/15/2022.  TECHNIQUE: CT scan of the abdomen and pelvis was performed without IV contrast. Multiplanar reformats were obtained. Dose reduction techniques were used.  CONTRAST: None.    FINDINGS:   LOWER CHEST: Normal.    HEPATOBILIARY: Normal.    PANCREAS: Normal.    SPLEEN: 2.5 cm bland appearing splenic cyst has enlarged, previously 1.2 cm.    ADRENAL GLANDS: Normal.    KIDNEYS/BLADDER: Normal. No stones or hydronephrosis.    BOWEL: Nothing obstructive or  inflammatory. Previous gastric surgery.    LYMPH NODES: No lymphadenopathy.    VASCULATURE: Unremarkable.    PELVIC ORGANS: No adnexal lesions. No free fluid.    MUSCULOSKELETAL: Unremarkable.      Impression    IMPRESSION:   1.  No etiology for symptoms evident. No urinary tract stones. Nothing inflammatory involving bowel.     Abdomen US, limited (RUQ only)    Narrative    EXAM: US ABDOMEN LIMITED  LOCATION: [facility]  DATE/TIME: 11/15/2022 7:10 PM    INDICATION: ruq pain  COMPARISON: None.  TECHNIQUE: Limited abdominal ultrasound.    FINDINGS:    GALLBLADDER: Normal. No gallstones, wall thickening, or pericholecystic fluid. Negative sonographic Duran's sign.    BILE DUCTS: No biliary dilatation. The common duct measures 3.2 mm.    LIVER: Normal parenchyma with smooth contour. No focal mass.    RIGHT KIDNEY: No hydronephrosis.    PANCREAS: The visualized portions are normal.    No ascites.      Impression    IMPRESSION:  1.  Normal limited abdominal ultrasound.             I, Kenisha Himjewel, am serving as a scribe to document services personally performed by Dr. Cesar Jones based on my observation and the provider's statements to me. I, Cesar Jones MD attest that Kenisha Chen is acting in a scribe capacity, has observed my performance of the services and has documented them in accordance with my direction.      Cesar Jones MD  11/15/22  Emergency Medicine  [facility] EMERGENCY DEPARTMENT  [address] [place] [id number]  [phone]  Dept: [phone]     Cesar Jones MD  11/15/22 2428

## 2022-11-16 NOTE — ED NOTES
Discharge paperwork and follow up care discussed with pt. Pt verbalized understanding and has no questions at this time.

## 2022-11-16 NOTE — DISCHARGE INSTRUCTIONS
Continue your previously-prescribed Protonix. Take the Pepcid & Carafate as prescribed.    Follow up with your Primary Care provider in 2 days for a recheck.    Return to the Emergency Department for any difficulty breathing, persistent vomiting, new or worsening symptoms, or any other concerns.

## 2023-10-24 ENCOUNTER — ANCILLARY PROCEDURE (OUTPATIENT)
Dept: MAMMOGRAPHY | Facility: CLINIC | Age: 68
End: 2023-10-24
Attending: INTERNAL MEDICINE
Payer: COMMERCIAL

## 2023-10-24 DIAGNOSIS — Z12.31 VISIT FOR SCREENING MAMMOGRAM: ICD-10-CM

## 2023-10-24 PROCEDURE — 77067 SCR MAMMO BI INCL CAD: CPT

## 2023-11-29 ENCOUNTER — HOSPITAL ENCOUNTER (EMERGENCY)
Facility: HOSPITAL | Age: 68
Discharge: HOME | End: 2023-11-29
Attending: EMERGENCY MEDICINE | Admitting: EMERGENCY MEDICINE
Payer: COMMERCIAL

## 2023-11-29 ENCOUNTER — APPOINTMENT (OUTPATIENT)
Dept: RADIOLOGY | Facility: HOSPITAL | Age: 68
End: 2023-11-29
Attending: EMERGENCY MEDICINE
Payer: COMMERCIAL

## 2023-11-29 VITALS
BODY MASS INDEX: 45.99 KG/M2 | RESPIRATION RATE: 19 BRPM | OXYGEN SATURATION: 92 % | HEART RATE: 67 BPM | TEMPERATURE: 97.7 F | DIASTOLIC BLOOD PRESSURE: 65 MMHG | HEIGHT: 67 IN | SYSTOLIC BLOOD PRESSURE: 156 MMHG | WEIGHT: 293 LBS

## 2023-11-29 DIAGNOSIS — R07.9 CHEST PAIN, UNSPECIFIED TYPE: ICD-10-CM

## 2023-11-29 LAB
ALBUMIN SERPL BCG-MCNC: 4.1 G/DL (ref 3.5–5.2)
ALP SERPL-CCNC: 117 U/L (ref 40–150)
ALT SERPL W P-5'-P-CCNC: 13 U/L (ref 0–50)
ANION GAP SERPL CALCULATED.3IONS-SCNC: 10 MMOL/L (ref 7–15)
AST SERPL W P-5'-P-CCNC: 13 U/L (ref 0–45)
BASOPHILS # BLD AUTO: 0 10E3/UL (ref 0–0.2)
BASOPHILS NFR BLD AUTO: 0 %
BILIRUB DIRECT SERPL-MCNC: <0.2 MG/DL (ref 0–0.3)
BILIRUB SERPL-MCNC: 0.5 MG/DL
BUN SERPL-MCNC: 11.4 MG/DL (ref 8–23)
CALCIUM SERPL-MCNC: 9.4 MG/DL (ref 8.8–10.2)
CHLORIDE SERPL-SCNC: 102 MMOL/L (ref 98–107)
CREAT SERPL-MCNC: 0.69 MG/DL (ref 0.51–0.95)
DEPRECATED HCO3 PLAS-SCNC: 28 MMOL/L (ref 22–29)
EGFRCR SERPLBLD CKD-EPI 2021: >90 ML/MIN/1.73M2
EOSINOPHIL # BLD AUTO: 0.1 10E3/UL (ref 0–0.7)
EOSINOPHIL NFR BLD AUTO: 1 %
ERYTHROCYTE [DISTWIDTH] IN BLOOD BY AUTOMATED COUNT: 13.8 % (ref 10–15)
GLUCOSE SERPL-MCNC: 112 MG/DL (ref 70–99)
HCT VFR BLD AUTO: 42.1 % (ref 35–47)
HGB BLD-MCNC: 14 G/DL (ref 11.7–15.7)
IMM GRANULOCYTES # BLD: 0 10E3/UL
IMM GRANULOCYTES NFR BLD: 0 %
LIPASE SERPL-CCNC: 48 U/L (ref 13–60)
LYMPHOCYTES # BLD AUTO: 3.6 10E3/UL (ref 0.8–5.3)
LYMPHOCYTES NFR BLD AUTO: 38 %
MCH RBC QN AUTO: 28.7 PG (ref 26.5–33)
MCHC RBC AUTO-ENTMCNC: 33.3 G/DL (ref 31.5–36.5)
MCV RBC AUTO: 86 FL (ref 78–100)
MONOCYTES # BLD AUTO: 1.1 10E3/UL (ref 0–1.3)
MONOCYTES NFR BLD AUTO: 11 %
NEUTROPHILS # BLD AUTO: 4.8 10E3/UL (ref 1.6–8.3)
NEUTROPHILS NFR BLD AUTO: 50 %
NRBC # BLD AUTO: 0 10E3/UL
NRBC BLD AUTO-RTO: 0 /100
PLATELET # BLD AUTO: 201 10E3/UL (ref 150–450)
POTASSIUM SERPL-SCNC: 3.8 MMOL/L (ref 3.4–5.3)
PROT SERPL-MCNC: 7 G/DL (ref 6.4–8.3)
RBC # BLD AUTO: 4.88 10E6/UL (ref 3.8–5.2)
SODIUM SERPL-SCNC: 140 MMOL/L (ref 135–145)
TROPONIN T SERPL HS-MCNC: 8 NG/L
TROPONIN T SERPL HS-MCNC: 8 NG/L
WBC # BLD AUTO: 9.7 10E3/UL (ref 4–11)

## 2023-11-29 PROCEDURE — 83690 ASSAY OF LIPASE: CPT | Performed by: EMERGENCY MEDICINE

## 2023-11-29 PROCEDURE — 71046 X-RAY EXAM CHEST 2 VIEWS: CPT

## 2023-11-29 PROCEDURE — 93005 ELECTROCARDIOGRAM TRACING: CPT | Performed by: STUDENT IN AN ORGANIZED HEALTH CARE EDUCATION/TRAINING PROGRAM

## 2023-11-29 PROCEDURE — 36415 COLL VENOUS BLD VENIPUNCTURE: CPT | Performed by: EMERGENCY MEDICINE

## 2023-11-29 PROCEDURE — 99285 EMERGENCY DEPT VISIT HI MDM: CPT | Mod: 25

## 2023-11-29 PROCEDURE — 82248 BILIRUBIN DIRECT: CPT | Performed by: EMERGENCY MEDICINE

## 2023-11-29 PROCEDURE — 84484 ASSAY OF TROPONIN QUANT: CPT | Mod: 91 | Performed by: EMERGENCY MEDICINE

## 2023-11-29 PROCEDURE — 85025 COMPLETE CBC W/AUTO DIFF WBC: CPT | Performed by: EMERGENCY MEDICINE

## 2023-11-29 ASSESSMENT — ACTIVITIES OF DAILY LIVING (ADL): ADLS_ACUITY_SCORE: 35

## 2023-11-29 NOTE — Clinical Note
Concepción De was seen and treated in our emergency department on 11/29/2023.  She may return to work on 11/30/2023.       If you have any questions or concerns, please don't hesitate to call.      Jennifer Hook MD

## 2023-11-29 NOTE — ED PROVIDER NOTES
EMERGENCY DEPARTMENT ENCOUNTER      NAME: Concepción De  AGE: 68 year old female  YOB: 1955  MRN: [id number]  EVALUATION DATE & TIME: 11/29/2023  3:15 AM    PCP: Keya Dailey    ED PROVIDER: Jennifer Hook M.D.      Chief Complaint   Patient presents with    Side Pain          FINAL IMPRESSION:  1. Chest pain, unspecified type          ED COURSE & MEDICAL DECISION MAKING:    ED Course as of 11/29/23 0612   Wed Nov 29, 2023   0323 Patient with atypical resolved episode of chest pain, pending troponin, EKG, LFTs/lipase, and will serially reassess and clinically monitor to ensure no signs of ACS, patient ok [facility] plan   0437 CXR negative, troponin at 0338 8, 2h delta troponin ordered to r/o ACS. LFTs, CBC and BMP WNL and lipase WNL   0542 I spoke with NESSA Meza who will draw 2nd troponin now   0604 Repeat troponin reassuringly without substantial change thus per high sensitivity troponin screening protocol, ACS is ruled out. I reassessed patient and she notes she feels well and ok with plan to discharge to home with atypical low risk per HEART score atypical resolved chest pain episode. Patient discharged after being provided with extensive anticipatory guidance and given return precautions, importance of PMD follow-up emphasized.        Pertinent Labs & Imaging studies reviewed. (See chart for details)    N95 worn  A face shield was worn also  COVID PPE    Medical Decision Making    History:  Supplemental history from: Documented in chart, if applicable  External Record(s) reviewed: Documented in chart, if applicable.    Work Up:  Chart documentation includes differential considered and any EKGs or imaging independently interpreted by provider, where specified.  In additional to work up documented, I considered the following work up: Documented in chart, if applicable.    External consultation:  Discussion of management with another provider: Documented in chart, if applicable    Complicating  factors:  Care impacted by chronic illness: Diabetes and Mental Health  Care affected by social determinants of health: Access to Medical Care    Disposition considerations: Discharge. No recommendations on prescription strength medication(s). I considered admission, but discharged patient after significant clinical improvement.    At the conclusion of the encounter I discussed the results of all of the tests and the disposition. The questions were answered. The patient or family acknowledged understanding and was agreeable with the care plan.     MEDICATIONS GIVEN IN THE EMERGENCY:  Medications - No data to display    NEW PRESCRIPTIONS STARTED AT TODAY'S ER VISIT  New Prescriptions    No medications on file        =================================================================    HPI    Concepción De is a 68 year old female with PMHx of A-fib, Polar 1 disorder, schizoaffective disorder, type 2 diabetes, morbid obesity, and GERD who presents to the ED today via private car for evaluation of chest pain.    Patient reports a sudden onset of left lateral chest wall pain beginning while watching TV yesterday around 7:30 PM.  Pain radiates into her back and under her left breast.  Notes that pain is intermittent and transient in nature, only lasting a few seconds before resolving on its own.  Took Tylenol around 11 PM yesterday, no relief. No known provoking or palliating factors.  No history of similar symptoms.  At present, she is asymptomatic.    Of note, she denies any known familial history of early cardiac events.    REVIEW OF SYSTEMS   All other systems reviewed and are negative except as noted above in HPI.    PAST MEDICAL HISTORY:  Past Medical History:   Diagnosis Date    Bipolar affective (H)     Depressive disorder     GERD (gastroesophageal reflux disease)     Obesity     Paroxysmal atrial fibrillation (H)     Pre-diabetes        PAST SURGICAL HISTORY:  Past Surgical History:   Procedure Laterality  Date    APPENDECTOMY      BREAST CYST EXCISION Left     High school age    ENT SURGERY      GASTROPLASTY VERTICAL BANDED      GI SURGERY      GYN SURGERY      RADICAL HYSTERECTOMY Bilateral 2003       CURRENT MEDICATIONS:    ARIPiprazole (ABILIFY) 5 MG tablet  aspirin 81 MG EC tablet  atenolol (TENORMIN) 25 MG tablet  clonazePAM (KLONOPIN) 0.5 MG tablet  escitalopram (LEXAPRO) 20 MG tablet  famotidine (PEPCID) 20 MG tablet  furosemide (LASIX) 20 MG tablet  metFORMIN (GLUCOPHAGE) 500 MG tablet  Multiple Vitamins-Minerals (PRESERVISION AREDS 2 PO)  pantoprazole (PROTONIX) 20 MG EC tablet  polyethylene glycol-propylene glycol (SYSTANE ULTRA) 0.4-0.3 % SOLN ophthalmic solution  sucralfate (CARAFATE) 1 GM tablet        ALLERGIES:  Allergies   Allergen Reactions    Clozapine Anaphylaxis    Ciprofloxacin Hives and Itching    Nitrofurantoin Other (See Comments) and Unknown     Flu per Patient  Flu per Patient  Flu per Patient      Pantoprazole Other (See Comments)     Extremity swelling, dry mouth    Sulfa Antibiotics     Sulfasalazine      Other reaction(s): *Unknown       FAMILY HISTORY:  Family History   Problem Relation Age of Onset    Cancer Mother     Cancer Father     Cirrhosis Father     Breast Cancer Mother 45.00    Ovarian Cancer Maternal Grandmother        SOCIAL HISTORY:   Social History     Socioeconomic History    Marital status: Single   Tobacco Use    Smoking status: Never    Smokeless tobacco: Never   Substance and Sexual Activity    Alcohol use: Not Currently    Drug use: Never    Sexual activity: Yes   Other Topics Concern    Parent/sibling w/ CABG, MI or angioplasty before 65F 55M? No       VITALS:  Patient Vitals for the past 24 hrs:   BP Temp Temp src Pulse Resp SpO2 Height Weight   11/29/23 0548 (!) 148/65 -- -- 69 -- 94 % -- --   11/29/23 0345 (!) 160/71 -- -- 66 19 95 % -- --   11/29/23 0336 (!) 145/63 -- -- 68 23 95 % -- --   11/29/23 0323 -- -- -- 68 18 97 % -- --   11/29/23 0320 132/72 -- -- --  "-- -- -- --   11/29/23 0305 (!) 171/72 97.7  F (36.5  C) Oral 77 24 97 % 1.702 m (5' 7\") 133.8 kg (295 lb)       PHYSICAL EXAM    GENERAL: Awake, alert. In no acute distress.   HEENT: Normocephalic, atraumatic.  Pupils equal, round and reactive.  Conjunctiva normal.  EOMI.  NECK: No stridor or apparent deformity.  PULMONARY: Symmetrical breath sounds without distress.  Lungs clear to auscultation bilaterally without wheezes, rhonchi or rales.  CARDIO: Regular rate and rhythm.  No significant murmur, rub or gallop. Radial pulses strong and symmetrical.  ABDOMINAL: Abdomen soft, non-distended and non-tender to palpation. No CVAT, no palpable hepatosplenomegaly.  EXTREMITIES: No lower extremity swelling or edema.    NEURO: Alert and oriented to person, place and time. Cranial nerves grossly intact. No focal motor deficit.  PSYCH: Normal mood and affect  SKIN: No rashes      LAB:  All pertinent labs reviewed and interpreted.  Results for orders placed or performed during the hospital encounter of 11/29/23   XR Chest 2 Views    Impression    IMPRESSION: PA and lateral views of the chest were obtained. Cardiomediastinal silhouette is within normal limits. No suspicious focal pulmonary opacities. No significant pleural effusion or pneumothorax.           Basic metabolic panel   Result Value Ref Range    Sodium 140 135 - 145 mmol/L    Potassium 3.8 3.4 - 5.3 mmol/L    Chloride 102 98 - 107 mmol/L    Carbon Dioxide (CO2) 28 22 - 29 mmol/L    Anion Gap 10 7 - 15 mmol/L    Urea Nitrogen 11.4 8.0 - 23.0 mg/dL    Creatinine 0.69 0.51 - 0.95 mg/dL    GFR Estimate >90 >60 mL/min/1.73m2    Calcium 9.4 8.8 - 10.2 mg/dL    Glucose 112 (H) 70 - 99 mg/dL   Result Value Ref Range    Lipase 48 13 - 60 U/L   Hepatic function panel   Result Value Ref Range    Protein Total 7.0 6.4 - 8.3 g/dL    Albumin 4.1 3.5 - 5.2 g/dL    Bilirubin Total 0.5 <=1.2 mg/dL    Alkaline Phosphatase 117 40 - 150 U/L    AST 13 0 - 45 U/L    ALT 13 0 - 50 U/L    " Bilirubin Direct <0.20 0.00 - 0.30 mg/dL   Result Value Ref Range    Troponin T, High Sensitivity 8 <=14 ng/L   CBC with platelets and differential   Result Value Ref Range    WBC Count 9.7 4.0 - 11.0 10e3/uL    RBC Count 4.88 3.80 - 5.20 10e6/uL    Hemoglobin 14.0 11.7 - 15.7 g/dL    Hematocrit 42.1 35.0 - 47.0 %    MCV 86 78 - 100 fL    MCH 28.7 26.5 - 33.0 pg    MCHC 33.3 31.5 - 36.5 g/dL    RDW 13.8 10.0 - 15.0 %    Platelet Count 201 150 - 450 10e3/uL    % Neutrophils 50 %    % Lymphocytes 38 %    % Monocytes 11 %    % Eosinophils 1 %    % Basophils 0 %    % Immature Granulocytes 0 %    NRBCs per 100 WBC 0 <1 /100    Absolute Neutrophils 4.8 1.6 - 8.3 10e3/uL    Absolute Lymphocytes 3.6 0.8 - 5.3 10e3/uL    Absolute Monocytes 1.1 0.0 - 1.3 10e3/uL    Absolute Eosinophils 0.1 0.0 - 0.7 10e3/uL    Absolute Basophils 0.0 0.0 - 0.2 10e3/uL    Absolute Immature Granulocytes 0.0 <=0.4 10e3/uL    Absolute NRBCs 0.0 10e3/uL   Result Value Ref Range    Troponin T, High Sensitivity 8 <=14 ng/L       RADIOLOGY:  Reviewed all pertinent imaging. Please see official radiology report.  XR Chest 2 Views   Final Result   IMPRESSION: PA and lateral views of the chest were obtained. Cardiomediastinal silhouette is within normal limits. No suspicious focal pulmonary opacities. No significant pleural effusion or pneumothorax.                       EKG:    Reviewed and interpreted as: 11/29/2023 at 03:23 shows sinus rhythm with PVCs, rate of 68 bpm.  When compared to previous, 12/11/2011, PVCs are not present.    I have independently reviewed and interpreted the EKG(s) documented above.      I, Yury Joel, am serving as a scribe to document services personally performed by Dr. Jennifer Hook based on my observation and the provider's statements to me. I, Jennifer Hook MD attest that Yury Joel is acting in a scribe capacity, has observed my performance of the services and has documented them in accordance with my direction.        Jennifer Hook MD  11/29/23 0613

## 2023-11-29 NOTE — ED TRIAGE NOTES
Pt arrives for evaluation of pain in her left side that radiates under her left shoulder blade and under her left breast. Pain started suddenly around 1930 and has been constant. Denies feeling any pain in her chest or SOB. No N/V/D. Denies any strenuous activity prior. Last took Tylenol around 2300. Does have a hx of being in Afib, not currently on thinners.      Triage Assessment (Adult)       Row Name 11/29/23 0303          Triage Assessment    Airway WDL WDL        Respiratory WDL    Respiratory WDL WDL        Skin Circulation/Temperature WDL    Skin Circulation/Temperature WDL WDL        Cardiac WDL    Cardiac WDL WDL        Peripheral/Neurovascular WDL    Peripheral Neurovascular WDL WDL        Cognitive/Neuro/Behavioral WDL    Cognitive/Neuro/Behavioral WDL WDL

## 2023-11-30 LAB
ATRIAL RATE - MUSE: 68 BPM
DIASTOLIC BLOOD PRESSURE - MUSE: 72 MMHG
INTERPRETATION ECG - MUSE: NORMAL
P AXIS - MUSE: 70 DEGREES
PR INTERVAL - MUSE: 182 MS
QRS DURATION - MUSE: 84 MS
QT - MUSE: 420 MS
QTC - MUSE: 446 MS
R AXIS - MUSE: -56 DEGREES
SYSTOLIC BLOOD PRESSURE - MUSE: 132 MMHG
T AXIS - MUSE: 66 DEGREES
VENTRICULAR RATE- MUSE: 68 BPM

## 2023-12-31 ENCOUNTER — HOSPITAL ENCOUNTER (EMERGENCY)
Facility: HOSPITAL | Age: 68
Discharge: HOME | End: 2023-12-31
Attending: EMERGENCY MEDICINE | Admitting: EMERGENCY MEDICINE
Payer: COMMERCIAL

## 2023-12-31 ENCOUNTER — APPOINTMENT (OUTPATIENT)
Dept: CT IMAGING | Facility: HOSPITAL | Age: 68
End: 2023-12-31
Attending: EMERGENCY MEDICINE
Payer: COMMERCIAL

## 2023-12-31 VITALS
TEMPERATURE: 97.9 F | WEIGHT: 290.4 LBS | RESPIRATION RATE: 20 BRPM | BODY MASS INDEX: 45.58 KG/M2 | HEIGHT: 67 IN | SYSTOLIC BLOOD PRESSURE: 102 MMHG | OXYGEN SATURATION: 99 % | DIASTOLIC BLOOD PRESSURE: 51 MMHG | HEART RATE: 76 BPM

## 2023-12-31 DIAGNOSIS — R19.7 NAUSEA VOMITING AND DIARRHEA: ICD-10-CM

## 2023-12-31 DIAGNOSIS — R11.2 NAUSEA VOMITING AND DIARRHEA: ICD-10-CM

## 2023-12-31 LAB
ALBUMIN SERPL BCG-MCNC: 4.5 G/DL (ref 3.5–5.2)
ALP SERPL-CCNC: 85 U/L (ref 40–150)
ALT SERPL W P-5'-P-CCNC: 21 U/L (ref 0–50)
ANION GAP SERPL CALCULATED.3IONS-SCNC: 13 MMOL/L (ref 7–15)
AST SERPL W P-5'-P-CCNC: 23 U/L (ref 0–45)
BASOPHILS # BLD AUTO: 0 10E3/UL (ref 0–0.2)
BASOPHILS NFR BLD AUTO: 0 %
BILIRUB DIRECT SERPL-MCNC: 0.3 MG/DL (ref 0–0.3)
BILIRUB SERPL-MCNC: 0.9 MG/DL
BUN SERPL-MCNC: 8.2 MG/DL (ref 8–23)
CALCIUM SERPL-MCNC: 8.9 MG/DL (ref 8.8–10.2)
CHLORIDE SERPL-SCNC: 103 MMOL/L (ref 98–107)
CREAT SERPL-MCNC: 0.67 MG/DL (ref 0.51–0.95)
DEPRECATED HCO3 PLAS-SCNC: 24 MMOL/L (ref 22–29)
EGFRCR SERPLBLD CKD-EPI 2021: >90 ML/MIN/1.73M2
EOSINOPHIL # BLD AUTO: 0 10E3/UL (ref 0–0.7)
EOSINOPHIL NFR BLD AUTO: 0 %
ERYTHROCYTE [DISTWIDTH] IN BLOOD BY AUTOMATED COUNT: 13.3 % (ref 10–15)
FLUAV RNA SPEC QL NAA+PROBE: NEGATIVE
FLUBV RNA RESP QL NAA+PROBE: NEGATIVE
GLUCOSE SERPL-MCNC: 116 MG/DL (ref 70–99)
HCT VFR BLD AUTO: 45.7 % (ref 35–47)
HGB BLD-MCNC: 15.5 G/DL (ref 11.7–15.7)
HOLD SPECIMEN: NORMAL
HOLD SPECIMEN: NORMAL
IMM GRANULOCYTES # BLD: 0 10E3/UL
IMM GRANULOCYTES NFR BLD: 0 %
LIPASE SERPL-CCNC: 29 U/L (ref 13–60)
LYMPHOCYTES # BLD AUTO: 1.9 10E3/UL (ref 0.8–5.3)
LYMPHOCYTES NFR BLD AUTO: 23 %
MAGNESIUM SERPL-MCNC: 2 MG/DL (ref 1.7–2.3)
MCH RBC QN AUTO: 29.1 PG (ref 26.5–33)
MCHC RBC AUTO-ENTMCNC: 33.9 G/DL (ref 31.5–36.5)
MCV RBC AUTO: 86 FL (ref 78–100)
MONOCYTES # BLD AUTO: 1.2 10E3/UL (ref 0–1.3)
MONOCYTES NFR BLD AUTO: 14 %
NEUTROPHILS # BLD AUTO: 5.1 10E3/UL (ref 1.6–8.3)
NEUTROPHILS NFR BLD AUTO: 63 %
NRBC # BLD AUTO: 0 10E3/UL
NRBC BLD AUTO-RTO: 0 /100
PLATELET # BLD AUTO: 247 10E3/UL (ref 150–450)
POTASSIUM SERPL-SCNC: 3.6 MMOL/L (ref 3.4–5.3)
PROT SERPL-MCNC: 7.4 G/DL (ref 6.4–8.3)
RBC # BLD AUTO: 5.32 10E6/UL (ref 3.8–5.2)
RSV RNA SPEC NAA+PROBE: NEGATIVE
SARS-COV-2 RNA RESP QL NAA+PROBE: NEGATIVE
SODIUM SERPL-SCNC: 140 MMOL/L (ref 135–145)
WBC # BLD AUTO: 8.2 10E3/UL (ref 4–11)

## 2023-12-31 PROCEDURE — 96361 HYDRATE IV INFUSION ADD-ON: CPT

## 2023-12-31 PROCEDURE — 250N000011 HC RX IP 250 OP 636: Performed by: EMERGENCY MEDICINE

## 2023-12-31 PROCEDURE — 99285 EMERGENCY DEPT VISIT HI MDM: CPT | Mod: 25

## 2023-12-31 PROCEDURE — 83735 ASSAY OF MAGNESIUM: CPT | Performed by: EMERGENCY MEDICINE

## 2023-12-31 PROCEDURE — 96374 THER/PROPH/DIAG INJ IV PUSH: CPT | Mod: 59

## 2023-12-31 PROCEDURE — 74177 CT ABD & PELVIS W/CONTRAST: CPT

## 2023-12-31 PROCEDURE — 87637 SARSCOV2&INF A&B&RSV AMP PRB: CPT | Performed by: EMERGENCY MEDICINE

## 2023-12-31 PROCEDURE — 258N000003 HC RX IP 258 OP 636: Performed by: EMERGENCY MEDICINE

## 2023-12-31 PROCEDURE — 36415 COLL VENOUS BLD VENIPUNCTURE: CPT | Performed by: EMERGENCY MEDICINE

## 2023-12-31 PROCEDURE — 85025 COMPLETE CBC W/AUTO DIFF WBC: CPT | Performed by: EMERGENCY MEDICINE

## 2023-12-31 PROCEDURE — 83690 ASSAY OF LIPASE: CPT | Performed by: EMERGENCY MEDICINE

## 2023-12-31 PROCEDURE — 80053 COMPREHEN METABOLIC PANEL: CPT | Performed by: EMERGENCY MEDICINE

## 2023-12-31 RX ORDER — IOPAMIDOL 755 MG/ML
90 INJECTION, SOLUTION INTRAVASCULAR ONCE
Status: COMPLETED | OUTPATIENT
Start: 2023-12-31 | End: 2023-12-31

## 2023-12-31 RX ORDER — ONDANSETRON 2 MG/ML
4 INJECTION INTRAMUSCULAR; INTRAVENOUS ONCE
Status: COMPLETED | OUTPATIENT
Start: 2023-12-31 | End: 2023-12-31

## 2023-12-31 RX ORDER — ONDANSETRON 4 MG/1
4 TABLET, ORALLY DISINTEGRATING ORAL EVERY 8 HOURS PRN
Qty: 10 TABLET | Refills: 0 | Status: SHIPPED | OUTPATIENT
Start: 2023-12-31

## 2023-12-31 RX ADMIN — IOPAMIDOL 90 ML: 755 INJECTION, SOLUTION INTRAVENOUS at 18:01

## 2023-12-31 RX ADMIN — ONDANSETRON 4 MG: 2 INJECTION INTRAMUSCULAR; INTRAVENOUS at 19:33

## 2023-12-31 RX ADMIN — SODIUM CHLORIDE 1000 ML: 9 INJECTION, SOLUTION INTRAVENOUS at 16:45

## 2023-12-31 ASSESSMENT — ENCOUNTER SYMPTOMS
DIARRHEA: 1
NAUSEA: 1
FEVER: 0
VOMITING: 1
SHORTNESS OF BREATH: 0
COUGH: 0
DYSURIA: 0
ABDOMINAL PAIN: 1

## 2023-12-31 ASSESSMENT — ACTIVITIES OF DAILY LIVING (ADL)
ADLS_ACUITY_SCORE: 35
ADLS_ACUITY_SCORE: 35

## 2023-12-31 NOTE — ED TRIAGE NOTES
Pt ambulatory to triage c/o n/v/d since Tuesday. Pt reports she has lost 13 lbs in that time, today only had small sips of liquids and 4 saltine crackers. Pt reports occasional abd cramping with the diarrhea but otherwise denies abd pain. Denies urinary symptoms. Denies fever.  Pt states her mother has had COVID-19 for 10 days, pt has not seen her in about 14 days. Pt has taken 2 COVID tests at home that were negative.

## 2023-12-31 NOTE — ED PROVIDER NOTES
EMERGENCY DEPARTMENT ENCOUNTER      NAME: Concepción De  AGE: 68 year old female  YOB: 1955  MRN: [id number]  EVALUATION DATE & TIME: 12/31/2023  3:51 PM    PCP: Keya Dailey    ED PROVIDER: Debbie Meyers M.D.        Chief Complaint   Patient presents with    Nausea, Vomiting, & Diarrhea         FINAL IMPRESSION:    1. Nausea vomiting and diarrhea            MEDICAL DECISION MAKING:    Concepción De is a 68 year old female with history of obesity, paroxysmal A-fib, DVT, WATTERS, anxiety, bipolar, who presents to the ER with complaints of abdominal pain, nausea, vomiting, diarrhea that began 2 days ago.  Imaging and laboratories overall reassuring.  Patient unable to provide a stool study here in the ER as she took Imodium prior to arrival.  Plan at this time is discharge home with prescription for Zofran and patient can provide stool study as outpatient tomorrow if she continues to have area.  She does not appear toxic or septic.  Tolerated p.o. challenge here in the ER prior to discharge.  She agrees with the plan to go home and all of her questions have been answered.      ED COURSE:  3:58 PM  I met with the patient to gather history and perform my exam. ED course and treatment discussed.    5:58 PM  Patient has not been able to provide a stool sample yet.    6:38 PM  CT scan looks good.  She has not provided a stool sample yet.  We will do a p.o. challenge here and hopefully that we will get her bowels moving.  Patient comfortable with plan for discharge home with antiemetics.    Patient tolerated p.o. challenge here.  Unable to provide a stool sample but does admit she took Imodium prior to arrival.  Imaging all looks good.  I suspect this is more of a community-acquired viral gastroenteritis type picture.  Patient will be sent home with prescription for Zofran.  Outpatient stool studies have been ordered and she can deliver her sample to the lab tomorrow.  Patient agrees with  this plan.  She does not appear toxic or septic.    I do not think that this represents ACS, PE, ruptured AAA, aortic dissection, bowel obstruction, bowel ischemia, cholecystitis, pancreatitis, appendicitis, diverticulitis, kidney stone, pyelonephritis, incarcerated or strangulated hernia, ovarian torsion, PID, ectopic pregnancy, tubo-ovarian abscess, viscus perforation, perforated GI ulcer, or other such etiologies at this time.    At the conclusion of the encounter I discussed the results of all of the tests and the disposition. Their questions were answered. The patient (and any family present) acknowledged understanding and were agreeable with the care plan.        CONSULTANTS:  none        MEDICATIONS GIVEN IN THE EMERGENCY:  Medications   sodium chloride 0.9% BOLUS 1,000 mL (0 mLs Intravenous Stopped 12/31/23 1850)   iopamidol (ISOVUE-370) solution 90 mL (90 mLs Intravenous $Given 12/31/23 1801)   ondansetron (ZOFRAN) injection 4 mg (4 mg Intravenous $Given 12/31/23 1933)           NEW PRESCRIPTIONS STARTED AT TODAY'S ER VISIT     Medication List        Started      ondansetron 4 MG ODT tab  Commonly known as: ZOFRAN ODT  4 mg, Oral, EVERY 8 HOURS PRN                  CONDITION:  stable        DISPOSITION:  D.c home         =================================================================  =================================================================  TRIAGE ASSESSMENT:  Pt ambulatory to triage c/o n/v/d since Tuesday. Pt reports she has lost 13 lbs in that time, today only had small sips of liquids and 4 saltine crackers. Pt reports occasional abd cramping with the diarrhea but otherwise denies abd pain. Denies urinary symptoms. Denies fever.  Pt states her mother has had COVID-19 for 10 days, pt has not seen her in about 14 days. Pt has taken 2 COVID tests at home that were negative.         ED Triage Vitals [12/31/23 1544]   Enc Vitals Group      BP (!) 141/66      Pulse 85      Resp 20      Temp 97.9  " F (36.6  C)      Temp src Oral      SpO2 97 %      Weight 131.7 kg (290 lb 6.4 oz)      Height 1.702 m (5' 7\")       ================================================================  ================================================================    HPI    Patient information was obtained from: patient    Use of Intrepreter: N/A     Concepción De is a 68 year old female with history of obesity, paroxysmal A-fib, DVT, WATTERS, anxiety, bipolar, who presents to the ER with complaints of abdominal pain, nausea, vomiting, diarrhea that began 5 days ago.    She has had multiple episodes of diarrhea and vomiting.  Started out with diarrhea and then progressed to involve vomiting.  Reports Tmax of 100 at home a few times over the past couple of days.    Otherwise denies chest pain, shortness of breath, cough.  He has had difficulty keeping anything down other than a few sips of liquids and some crackers.  She reports a 13 pound weight loss during this timeframe.    She has had negative COVID tests at home.    She states that she has been told in the past that she may have IBS but states she has never had vomiting with IBS before.  She did take a dose of Imodium prior to coming to the ER.      REVIEW OF SYSTEMS  Review of Systems   Constitutional:  Negative for fever.   Respiratory:  Negative for cough and shortness of breath.    Cardiovascular:  Negative for chest pain.   Gastrointestinal:  Positive for abdominal pain, diarrhea, nausea and vomiting.   Genitourinary:  Negative for dysuria.   All other systems reviewed and are negative.        PAST MEDICAL HISTORY:  Past Medical History:   Diagnosis Date    Bipolar affective (H)     Depressive disorder     GERD (gastroesophageal reflux disease)     Obesity     Paroxysmal atrial fibrillation (H)     Pre-diabetes          PAST SURGICAL HISTORY:  Past Surgical History:   Procedure Laterality Date    APPENDECTOMY      BREAST CYST EXCISION Left     High school age    ENT " SURGERY      GASTROPLASTY VERTICAL BANDED      GI SURGERY      GYN SURGERY      RADICAL HYSTERECTOMY Bilateral 2003         CURRENT MEDICATIONS:    Prior to Admission medications    Medication Sig Start Date End Date Taking? Authorizing Provider   ARIPiprazole (ABILIFY) 5 MG tablet Take 7.5 mg by mouth daily    Reported, Patient   aspirin 81 MG EC tablet Take 81 mg by mouth daily    Reported, Patient   atenolol (TENORMIN) 25 MG tablet Take 25 mg by mouth daily 6/19/20   Reported, Patient   clonazePAM (KLONOPIN) 0.5 MG tablet Take 0.5 mg by mouth 2 times daily as needed 4/3/19   Reported, Patient   escitalopram (LEXAPRO) 20 MG tablet Take 20 mg by mouth daily    Reported, Patient   famotidine (PEPCID) 20 MG tablet Take 1 tablet (20 mg) by mouth 2 times daily 11/15/22   Cesar Jones MD   furosemide (LASIX) 20 MG tablet TAKE 1/2 TO 1 TABLET BY MOUTH DAILY AS NEEDED FOR LOWER EXTREMITY SWELLING 4/15/21   Reported, Patient   metFORMIN (GLUCOPHAGE) 500 MG tablet Take 500 mg by mouth daily    Reported, Patient   Multiple Vitamins-Minerals (PRESERVISION AREDS 2 PO)     Reported, Patient   pantoprazole (PROTONIX) 20 MG EC tablet Take 40 mg by mouth daily    Reported, Patient   polyethylene glycol-propylene glycol (SYSTANE ULTRA) 0.4-0.3 % SOLN ophthalmic solution Place 1 drop into both eyes every hour as needed for dry eyes    Reported, Patient   sucralfate (CARAFATE) 1 GM tablet Take 1 tablet (1 g) by mouth 4 times daily 11/15/22   Cesar Jones MD         ALLERGIES:  Allergies   Allergen Reactions    Clozapine Anaphylaxis    Ciprofloxacin Hives and Itching    Nitrofurantoin Other (See Comments) and Unknown     Flu per Patient  Flu per Patient  Flu per Patient      Pantoprazole Other (See Comments)     Extremity swelling, dry mouth    Sulfa Antibiotics     Sulfasalazine      Other reaction(s): *Unknown         FAMILY HISTORY:  Family History   Problem Relation Age of Onset    Cancer Mother     Cancer Father      "Cirrhosis Father     Breast Cancer Mother 45.00    Ovarian Cancer Maternal Grandmother          SOCIAL HISTORY:  Social History     Socioeconomic History    Marital status: Single     Spouse name: None    Number of children: None    Years of education: None    Highest education level: None   Tobacco Use    Smoking status: Never    Smokeless tobacco: Never   Substance and Sexual Activity    Alcohol use: Not Currently    Drug use: Never    Sexual activity: Yes   Other Topics Concern    Parent/sibling w/ CABG, MI or angioplasty before 65F 55M? No         VITALS:  Patient Vitals for the past 24 hrs:   BP Temp Temp src Pulse Resp SpO2 Height Weight   12/31/23 1938 102/51 -- -- 76 -- 99 % -- --   12/31/23 1745 127/59 -- -- 66 -- 97 % -- --   12/31/23 1730 -- -- -- 76 -- 98 % -- --   12/31/23 1715 135/63 -- -- 62 -- 97 % -- --   12/31/23 1710 127/63 -- -- 67 20 97 % -- --   12/31/23 1544 (!) 141/66 97.9  F (36.6  C) Oral 85 20 97 % 1.702 m (5' 7\") 131.7 kg (290 lb 6.4 oz)       Wt Readings from Last 3 Encounters:   12/31/23 131.7 kg (290 lb 6.4 oz)   11/29/23 133.8 kg (295 lb)   11/15/22 138.1 kg (304 lb 8 oz)       Estimated Creatinine Clearance: 115.3 mL/min (based on SCr of 0.67 mg/dL).    PHYSICAL EXAM    Constitutional:  Well developed, Well nourished, NAD  HENT:  Normocephalic, Atraumatic, Bilateral external ears normal, Nose normal. Neck- Supple, No stridor.   Eyes:  PERRL, EOMI, Conjunctiva normal, No discharge.  Respiratory:  Normal breath sounds, No respiratory distress, No wheezing, Speaks full sentences easily. No cough.   Cardiovascular:  Normal heart rate, Regular rhythm, No rubs, No gallops. Chest wall nontender.   GI:  +obesity.  Bowel sounds normal, Soft, +mild diffuse tenderness, No masses, No flank tenderness. No rebound or guarding.   : deferred  Musculoskeletal: No cyanosis, No clubbing. Good range of motion in all major joints. No major deformities noted.   Integument:  Warm, Dry, No erythema, No " rash.  No petechiae.   Neurologic:  Alert & oriented x 3  Psychiatric:  Affect normal, Cooperative         LAB:  All pertinent labs reviewed and interpreted.  Recent Results (from the past 24 hour(s))   Basic metabolic panel    Collection Time: 12/31/23  4:41 PM   Result Value Ref Range    Sodium 140 135 - 145 mmol/L    Potassium 3.6 3.4 - 5.3 mmol/L    Chloride 103 98 - 107 mmol/L    Carbon Dioxide (CO2) 24 22 - 29 mmol/L    Anion Gap 13 7 - 15 mmol/L    Urea Nitrogen 8.2 8.0 - 23.0 mg/dL    Creatinine 0.67 0.51 - 0.95 mg/dL    GFR Estimate >90 >60 mL/min/1.73m2    Calcium 8.9 8.8 - 10.2 mg/dL    Glucose 116 (H) 70 - 99 mg/dL   Hepatic function panel    Collection Time: 12/31/23  4:41 PM   Result Value Ref Range    Protein Total 7.4 6.4 - 8.3 g/dL    Albumin 4.5 3.5 - 5.2 g/dL    Bilirubin Total 0.9 <=1.2 mg/dL    Alkaline Phosphatase 85 40 - 150 U/L    AST 23 0 - 45 U/L    ALT 21 0 - 50 U/L    Bilirubin Direct 0.30 0.00 - 0.30 mg/dL   Lipase    Collection Time: 12/31/23  4:41 PM   Result Value Ref Range    Lipase 29 13 - 60 U/L   Magnesium    Collection Time: 12/31/23  4:41 PM   Result Value Ref Range    Magnesium 2.0 1.7 - 2.3 mg/dL   Extra Blue Top Tube    Collection Time: 12/31/23  4:41 PM   Result Value Ref Range    Hold Specimen JIC    Extra Red Top Tube    Collection Time: 12/31/23  4:41 PM   Result Value Ref Range    Hold Specimen JIC    CBC with platelets and differential    Collection Time: 12/31/23  4:41 PM   Result Value Ref Range    WBC Count 8.2 4.0 - 11.0 10e3/uL    RBC Count 5.32 (H) 3.80 - 5.20 10e6/uL    Hemoglobin 15.5 11.7 - 15.7 g/dL    Hematocrit 45.7 35.0 - 47.0 %    MCV 86 78 - 100 fL    MCH 29.1 26.5 - 33.0 pg    MCHC 33.9 31.5 - 36.5 g/dL    RDW 13.3 10.0 - 15.0 %    Platelet Count 247 150 - 450 10e3/uL    % Neutrophils 63 %    % Lymphocytes 23 %    % Monocytes 14 %    % Eosinophils 0 %    % Basophils 0 %    % Immature Granulocytes 0 %    NRBCs per 100 WBC 0 <1 /100    Absolute Neutrophils  5.1 1.6 - 8.3 10e3/uL    Absolute Lymphocytes 1.9 0.8 - 5.3 10e3/uL    Absolute Monocytes 1.2 0.0 - 1.3 10e3/uL    Absolute Eosinophils 0.0 0.0 - 0.7 10e3/uL    Absolute Basophils 0.0 0.0 - 0.2 10e3/uL    Absolute Immature Granulocytes 0.0 <=0.4 10e3/uL    Absolute NRBCs 0.0 10e3/uL   Symptomatic Influenza A/B, RSV, & SARS-CoV2 PCR (COVID-19) Nasopharyngeal    Collection Time: 12/31/23  4:49 PM    Specimen: Nasopharyngeal; Swab   Result Value Ref Range    Influenza A PCR Negative Negative    Influenza B PCR Negative Negative    RSV PCR Negative Negative    SARS CoV2 PCR Negative Negative       Lab Results   Component Value Date    ABORH O POS 09/09/2020           RADIOLOGY:  Reviewed all pertinent imaging. Please see official radiology report.    CT Abdomen Pelvis w Contrast   Final Result   IMPRESSION:    1.  No acute abnormality in the abdomen and pelvis.                  EKG:    none      PROCEDURES:  none      Medical Decision Making    History:  Supplemental history from: Documented in chart, if applicable  External Record(s) reviewed: Documented in chart, if applicable.    Work Up:  Chart documentation includes differential considered and any EKGs or imaging independently interpreted by provider, where specified.  In additional to work up documented, I considered the following work up: Documented in chart, if applicable.    External consultation:  Discussion of management with another provider: Documented in chart, if applicable    Complicating factors:  Care impacted by chronic illness: Heart Disease and Other: IBS  Care affected by social determinants of health: Access to Medical Care    Disposition considerations: Discharge. I prescribed additional prescription strength medication(s) as charted. I considered admission, but ultimately discharged patient imaging and laboratories reassuring.  Patient tolerated p.o. challenge.  Does not appear clinically dehydrated.  Diarrhea has slowed down currently.  At this  time I feel she is appropriate for discharge home.    Debbie Meyers M.D. [facility]  Emergency Medicine and Medical Toxicology  [facility] EMERGENCY DEPARTMENT  [facility] [facility] [id number] [phone]  Dept: [phone]           Debbie Meyers MD  12/31/23 1721

## 2024-01-01 NOTE — DISCHARGE INSTRUCTIONS
CT scan looks good.  I suspect this is the viral stomach bug going around the community.    Use the ondansetron nausea vomiting medication as prescribed if needed.    Follow-up with family doctor on Wednesday if you are not improving.    Return the emergency department with ongoing fevers, worsening abdominal pain, worsening vomiting or diarrhea, concerns for dehydration, or any other concerns.    Thank you for choosing [facility] Emergency Department.  It has been my pleasure caring for you today.     ~Dr. Luigi MD

## 2024-01-02 ENCOUNTER — LAB (OUTPATIENT)
Dept: LAB | Facility: HOSPITAL | Age: 69
End: 2024-01-02
Payer: COMMERCIAL

## 2024-01-02 DIAGNOSIS — R19.7 NAUSEA VOMITING AND DIARRHEA: ICD-10-CM

## 2024-01-02 DIAGNOSIS — R11.2 NAUSEA VOMITING AND DIARRHEA: ICD-10-CM

## 2024-01-02 LAB — C DIFF TOX B STL QL: NEGATIVE

## 2024-01-02 PROCEDURE — 87507 IADNA-DNA/RNA PROBE TQ 12-25: CPT

## 2024-01-02 PROCEDURE — 87493 C DIFF AMPLIFIED PROBE: CPT

## 2024-01-03 ENCOUNTER — TELEPHONE (OUTPATIENT)
Dept: EMERGENCY MEDICINE | Facility: HOSPITAL | Age: 69
End: 2024-01-03
Payer: COMMERCIAL

## 2024-01-03 LAB

## 2024-01-03 NOTE — RESULT ENCOUNTER NOTE
Final Enteric Bacteria and Virus Panel by IGOR Stool is POSITIVE for Sapovirus  Recommendations in treatment per [facility] ED Lab Result Enteric Bacteria and Virus Panel protocol.

## 2024-01-03 NOTE — TELEPHONE ENCOUNTER
[facility] Emergency Department/Urgent Care Lab result notification  [Note:  ED Lab Results RN will reference the [facility] Emergency Dept visit note prior to contacting patient AND/OR prior to consulting Emergency Dept Provider.  Highlights of Emergency Dept visit in information summary at the bottom of this telephone note]    1. Reason for call  Notify of lab results  Assess patient symptoms [if necessary]  Review ED Providers recommendations/discharge instructions (if necessary)  Advise per [facility] ED lab result protocol    2. Lab Result (including Rx patient on, if applicable).  If culture, copy of lab report at bottom.  Final Enteric Bacteria and Virus Panel by IGOR Stool is POSITIVE for Sapovirus  Recommendations in treatment per [facility] ED Lab Result Enteric Bacteria and Virus Panel protocol.    3. RN Assessment (Patient's current Symptoms):  Time of call: 9:25A  Assessment: Feeling better today;  No stools today;  Is not feeling nauseated;  Has urinated    4. RN Recommendations/Instructions per [place] ED lab result protocol  [facility] ED lab result protocol used: Norovirus   Concepción was notified of lab result and treatment recommendations  RN reviewed information about Sapovirus/norovirus    5. Please Contact your PCP clinic or return to the Emergency department if your:  Symptoms worsen or other concerning symptoms.    Information summary from Emergency Dept/Urgent Care visit on 12/31/23  Symptoms reported at ED/UC visit (Chief complaint, HPI)   Patient information was obtained from: patient     Use of Intrepreter: N/A     Concepción De is a 68 year old female with history of obesity, paroxysmal A-fib, DVT, WATTERS, anxiety, bipolar, who presents to the ER with complaints of abdominal pain, nausea, vomiting, diarrhea that began 5 days ago.     She has had multiple episodes of diarrhea and vomiting.  Started out with diarrhea and then progressed to involve  vomiting.  Reports Tmax of 100 at home a few times over the past couple of days.     Otherwise denies chest pain, shortness of breath, cough.  He has had difficulty keeping anything down other than a few sips of liquids and some crackers.  She reports a 13 pound weight loss during this timeframe.     She has had negative COVID tests at home.     She states that she has been told in the past that she may have IBS but states she has never had vomiting with IBS before.  She did take a dose of Imodium prior to coming to the ER.   ED/UC providers Impression and Plan (applicable information) MEDICAL DECISION MAKING:    Concepción De is a 68 year old female with history of obesity, paroxysmal A-fib, DVT, WATTERS, anxiety, bipolar, who presents to the ER with complaints of abdominal pain, nausea, vomiting, diarrhea that began 2 days ago.  Imaging and laboratories overall reassuring.  Patient unable to provide a stool study here in the ER as she took Imodium prior to arrival.  Plan at this time is discharge home with prescription for Zofran and patient can provide stool study as outpatient tomorrow if she continues to have area.  She does not appear toxic or septic.  Tolerated p.o. challenge here in the ER prior to discharge.  She agrees with the plan to go home and all of her questions have been answered.   Miscellaneous   Information (ED/UC Provider, diagnosis, etc)   NA       Copy of Lab report (if applicable)  Component      Latest Ref Rng 1/2/2024  9:30 AM   Campylobacter species      Negative  Negative    SALMONELLA SPECIES      Negative  Negative    Vibrio species      Negative  Negative    Vibrio cholerae      Negative  Negative    YERSINIA ENTEROCOLITICA      Negative  Negative    Enteropathogenic E. coli (EPEC)      Negative, NA  Negative    Shiga-like toxin-producing E. coli (STEC)      Negative  Negative    Shigella/Enteroinvasive E. coli (EIEC)      Negative  Negative    Cryptosporidium species      Negative   Negative    Giardia lamblia      Negative  Negative    Norovirus Gl/Gll      Negative  Negative    ROTAVIRUS A      Negative  Negative    Plesiomonas shigelloides      Negative  Negative    Enteroaggregative E. coli (EAEC)      Negative  Negative    Enterotoxigenic E. coli (ETEC)      Negative  Negative    E. coli O157      Negative, NA  NA    Cyclospora cayetanensis      Negative  Negative    Entamoeba histolytica      Negative  Negative    Adenovirus F40/41      Negative  Negative    Astrovirus      Negative  Negative    Sapovirus      Negative  Positive !       Legend:  ! Abnormal      Kyaw Silver RN  [facility]  Emergency Dept Lab Result RN  Ph# [phone]

## 2025-05-12 ENCOUNTER — HOSPITAL ENCOUNTER (EMERGENCY)
Facility: HOSPITAL | Age: 70
Discharge: HOME | End: 2025-05-12
Attending: EMERGENCY MEDICINE | Admitting: EMERGENCY MEDICINE
Payer: COMMERCIAL

## 2025-05-12 ENCOUNTER — APPOINTMENT (OUTPATIENT)
Dept: CT IMAGING | Facility: HOSPITAL | Age: 70
End: 2025-05-12
Attending: EMERGENCY MEDICINE
Payer: COMMERCIAL

## 2025-05-12 VITALS
DIASTOLIC BLOOD PRESSURE: 78 MMHG | TEMPERATURE: 98 F | RESPIRATION RATE: 16 BRPM | BODY MASS INDEX: 46.62 KG/M2 | HEART RATE: 78 BPM | HEIGHT: 66 IN | SYSTOLIC BLOOD PRESSURE: 121 MMHG | OXYGEN SATURATION: 99 % | WEIGHT: 290.1 LBS

## 2025-05-12 DIAGNOSIS — R19.7 DIARRHEA, UNSPECIFIED TYPE: ICD-10-CM

## 2025-05-12 LAB
ALBUMIN SERPL BCG-MCNC: 4.3 G/DL (ref 3.5–5.2)
ALP SERPL-CCNC: 106 U/L (ref 40–150)
ALT SERPL W P-5'-P-CCNC: 12 U/L (ref 0–50)
ANION GAP SERPL CALCULATED.3IONS-SCNC: 11 MMOL/L (ref 7–15)
AST SERPL W P-5'-P-CCNC: 15 U/L (ref 0–45)
BASOPHILS # BLD AUTO: 0 10E3/UL (ref 0–0.2)
BASOPHILS NFR BLD AUTO: 0 %
BILIRUB SERPL-MCNC: 0.5 MG/DL
BUN SERPL-MCNC: 6.8 MG/DL (ref 8–23)
CALCIUM SERPL-MCNC: 9.8 MG/DL (ref 8.8–10.4)
CHLORIDE SERPL-SCNC: 102 MMOL/L (ref 98–107)
CREAT SERPL-MCNC: 0.67 MG/DL (ref 0.51–0.95)
CRP SERPL-MCNC: 17.5 MG/L
EGFRCR SERPLBLD CKD-EPI 2021: >90 ML/MIN/1.73M2
EOSINOPHIL # BLD AUTO: 0.1 10E3/UL (ref 0–0.7)
EOSINOPHIL NFR BLD AUTO: 1 %
ERYTHROCYTE [DISTWIDTH] IN BLOOD BY AUTOMATED COUNT: 14.2 % (ref 10–15)
GLUCOSE SERPL-MCNC: 99 MG/DL (ref 70–99)
HCO3 SERPL-SCNC: 27 MMOL/L (ref 22–29)
HCT VFR BLD AUTO: 43.2 % (ref 35–47)
HGB BLD-MCNC: 14 G/DL (ref 11.7–15.7)
HOLD SPECIMEN: NORMAL
IMM GRANULOCYTES # BLD: 0 10E3/UL
IMM GRANULOCYTES NFR BLD: 0 %
LIPASE SERPL-CCNC: 47 U/L (ref 13–60)
LYMPHOCYTES # BLD AUTO: 2.4 10E3/UL (ref 0.8–5.3)
LYMPHOCYTES NFR BLD AUTO: 27 %
MAGNESIUM SERPL-MCNC: 1.8 MG/DL (ref 1.7–2.3)
MCH RBC QN AUTO: 27.4 PG (ref 26.5–33)
MCHC RBC AUTO-ENTMCNC: 32.4 G/DL (ref 31.5–36.5)
MCV RBC AUTO: 85 FL (ref 78–100)
MONOCYTES # BLD AUTO: 1 10E3/UL (ref 0–1.3)
MONOCYTES NFR BLD AUTO: 11 %
NEUTROPHILS # BLD AUTO: 5.4 10E3/UL (ref 1.6–8.3)
NEUTROPHILS NFR BLD AUTO: 61 %
NRBC # BLD AUTO: 0 10E3/UL
NRBC BLD AUTO-RTO: 0 /100
PLATELET # BLD AUTO: 222 10E3/UL (ref 150–450)
POTASSIUM SERPL-SCNC: 4 MMOL/L (ref 3.4–5.3)
PROT SERPL-MCNC: 7.5 G/DL (ref 6.4–8.3)
RBC # BLD AUTO: 5.11 10E6/UL (ref 3.8–5.2)
SODIUM SERPL-SCNC: 140 MMOL/L (ref 135–145)
WBC # BLD AUTO: 8.9 10E3/UL (ref 4–11)

## 2025-05-12 PROCEDURE — 36415 COLL VENOUS BLD VENIPUNCTURE: CPT | Performed by: EMERGENCY MEDICINE

## 2025-05-12 PROCEDURE — 82565 ASSAY OF CREATININE: CPT | Performed by: EMERGENCY MEDICINE

## 2025-05-12 PROCEDURE — 250N000011 HC RX IP 250 OP 636: Performed by: EMERGENCY MEDICINE

## 2025-05-12 PROCEDURE — 86140 C-REACTIVE PROTEIN: CPT | Performed by: EMERGENCY MEDICINE

## 2025-05-12 PROCEDURE — 74177 CT ABD & PELVIS W/CONTRAST: CPT

## 2025-05-12 PROCEDURE — 83690 ASSAY OF LIPASE: CPT | Performed by: EMERGENCY MEDICINE

## 2025-05-12 PROCEDURE — 83735 ASSAY OF MAGNESIUM: CPT | Performed by: EMERGENCY MEDICINE

## 2025-05-12 PROCEDURE — 99285 EMERGENCY DEPT VISIT HI MDM: CPT | Mod: 25

## 2025-05-12 PROCEDURE — 85025 COMPLETE CBC W/AUTO DIFF WBC: CPT | Performed by: EMERGENCY MEDICINE

## 2025-05-12 RX ORDER — IOPAMIDOL 755 MG/ML
90 INJECTION, SOLUTION INTRAVASCULAR ONCE
Status: COMPLETED | OUTPATIENT
Start: 2025-05-12 | End: 2025-05-12

## 2025-05-12 RX ADMIN — IOPAMIDOL 90 ML: 755 INJECTION, SOLUTION INTRAVENOUS at 19:26

## 2025-05-12 ASSESSMENT — COLUMBIA-SUICIDE SEVERITY RATING SCALE - C-SSRS
6. HAVE YOU EVER DONE ANYTHING, STARTED TO DO ANYTHING, OR PREPARED TO DO ANYTHING TO END YOUR LIFE?: YES
6. HAVE YOU EVER DONE ANYTHING, STARTED TO DO ANYTHING, OR PREPARED TO DO ANYTHING TO END YOUR LIFE?: NO
2. HAVE YOU ACTUALLY HAD ANY THOUGHTS OF KILLING YOURSELF IN THE PAST MONTH?: NO
1. IN THE PAST MONTH, HAVE YOU WISHED YOU WERE DEAD OR WISHED YOU COULD GO TO SLEEP AND NOT WAKE UP?: NO

## 2025-05-12 ASSESSMENT — ACTIVITIES OF DAILY LIVING (ADL): ADLS_ACUITY_SCORE: 41

## 2025-05-12 NOTE — ED PROVIDER NOTES
EMERGENCY DEPARTMENT ENCOUNTER      NAME: Concepción De  AGE: 69 year old female  YOB: 1955  MRN: [id number]  EVALUATION DATE & TIME: No admission date for patient encounter.    PCP: Keya Dailey    ED PROVIDER: Wali Mitchell M.D.      Chief Complaint   Patient presents with    Diarrhea    Generalized Weakness         FINAL IMPRESSION:  1. Diarrhea, unspecified type          ED COURSE & MEDICAL DECISION MAKIN:12 PM I met with the patient, obtained history, performed an initial exam, and discussed options and plan for diagnostics and treatment here in the ED.  8:58 PM Repeat exam is benign, discussed reasons for return and close follow up.     Pertinent Labs & Imaging studies reviewed. (See chart for details)  69 year old female presents to the Emergency Department for evaluation of diarrhea. Patient appears non toxic with stable vitals signs, patient afebrile with no tachycardia or hypoxia, no increased work of breathing.  Lungs are clear, abdomen is benign and I cannot reproduce any significant tenderness to deep palpation my abdominal exam.  Certainly no rigidity or distention.  Patient denies any bloody stools, hematemesis, vomiting, fevers.  Per review of the medical record, did review office visit through [facility] internal medicine on 2025 patient was noted to be on Mounjaro and experiencing irregular bowel movements for the past 3 months characterized by mushy stools and occasional diarrhea, patient states this is gotten worse over the past 3 to 4 days.  Again reassured no bloody stools or fever, considered but overall very low suspicion for malicious bacterial infection, C. difficile colitis.  With abdominal cramping, considered but again overall low suspicion for infectious colitis.  She also receives iron infusions.  Certainly her medications or iron infusions could be causing this diarrhea.  Nothing to suggest GI bleed at this time.   Nothing to suggest sepsis, bowel obstruction, mesenteric ischemia, or other more malicious etiology of symptoms.  Will obtain screening labs, stool studies and CT imaging.    Reassessment: Labs by my independent interpretation showed no signs of acute kidney injury with a creatinine of 0.67, no signs of anemia with a hemoglobin of 14.0, did note elevated CRP but again no elevated white blood cell count or fever here to suggest malicious infection.  Patient had no episodes of diarrhea while here in the department, we were unable to send stool studies, will give stool collection kit and recommend close follow-up with primary care.  CT imaging returned reported no acute concerning findings.  Repeat exam of the patient was completely benign.  With reassuring labs and imaging studies, considered admission but do feel she is safe for discharge close follow-up.  Suspect the iron infusions or Mounjaro may be related to her symptoms of diarrhea.  On my repeat exam she appears quite well and comfortable, discussed all these findings, discharge and need for close follow-up primary care.  Recommended she continue her Mounjaro and iron infusions so she can follow-up with her primary care provider.  Of note did see the suicide screen was positive but no endorsement of active thoughts of self-harm by my interactions with the patient here [facility].  All of her questions were answered and reasons to return discussed.  Patient felt comfortable this plan was discharged stable condition.    Medical Decision Making  I reviewed the EMR: Outpatient Record: Kieran Garcia  Discharge. I recommended the patient continue their current prescription strength medication(s): Mounjaro. See documentation for any additional details.    MIPS (CTPE, Dental pain, Khan, Sinusitis, Asthma/COPD, Head Trauma): Not Applicable    SEPSIS: None          At the conclusion of the encounter I discussed the results of all of the tests and the disposition.  "The questions were answered and return precautions provided. The patient or family acknowledged understanding and was agreeable with the care plan.         MEDICATIONS GIVEN IN THE EMERGENCY:  Medications   iopamidol (ISOVUE-370) solution 90 mL (90 mLs Intravenous $Given 5/12/25 1926)       NEW PRESCRIPTIONS STARTED AT TODAY'S ER VISIT  Discharge Medication List as of 5/12/2025  9:19 PM               =================================================================    HPI    Patient information was obtained from: Patient    Use of Intrepreter: N/A        Concepción De is a 69 year old female who presents for evaluation of diarrhea and generalized weakness.     Patient presents with worsening diarrhea that is present most right after she eats. She notes that since she began taking Munjaro a few months ago, she has been having intermittent diarrhea, but her diarrhea worsend on 05/08/2025. She further endorses some abdominal cramping, nausea, and lightheadedness. No loss of consciousness or head injury. Patient also states that she receives iron infusions due to low iron.     Patient denies any other complaints at this time.      VITALS:  Patient Vitals for the past 24 hrs:   BP Temp Temp src Pulse Resp SpO2 Height Weight   05/12/25 2127 121/78 -- -- 78 -- -- -- --   05/12/25 1937 131/62 98  F (36.7  C) Oral 74 -- 99 % -- --   05/12/25 1517 111/66 -- -- -- -- -- -- --   05/12/25 1513 -- 97.8  F (36.6  C) -- 69 16 100 % 1.676 m (5' 6\") 131.6 kg (290 lb 1.6 oz)        PHYSICAL EXAM    Constitutional:  Awake, alert, in no apparent distress  HENT:  Normocephalic, Atraumatic. Bilateral external ears normal. Oropharynx moist. Nose normal. Neck- Normal range of motion with no guarding, No midline cervical tenderness, Supple, No stridor.   Eyes:  PERRL, EOMI with no signs of entrapment, Conjunctiva normal, No discharge.   Respiratory:  Normal breath sounds, No respiratory distress, No wheezing.    Cardiovascular:  Normal " heart rate, Normal rhythm, No appreciable rubs or gallops.   GI:  Soft, No tenderness, No distension, No palpable masses  Musculoskeletal:  Intact distal pulses, No edema. Good range of motion in all major joints. No tenderness to palpation or major deformities noted.  Integument:  Warm, Dry, No erythema, No rash.   Neurologic:  Alert & oriented, Normal motor function, Normal sensory function, No focal deficits noted.   Psychiatric:  Affect normal, Judgment normal, Mood normal.     LAB:  All pertinent labs reviewed and interpreted.  Results for orders placed or performed during the hospital encounter of 05/12/25   CT Abdomen Pelvis w Contrast    Impression    IMPRESSION:   1.  Cholecystectomy and hysterectomy.  2.  Stable small hypodense nodule in the superior spleen.  3.  Small fat-containing midline upper ventral abdominal wall hernia with a tiny fat-containing periumbilical hernia.  4.  No bowel wall thickening.  5.  Mild splenomegaly.     Comprehensive metabolic panel   Result Value Ref Range    Sodium 140 135 - 145 mmol/L    Potassium 4.0 3.4 - 5.3 mmol/L    Carbon Dioxide (CO2) 27 22 - 29 mmol/L    Anion Gap 11 7 - 15 mmol/L    Urea Nitrogen 6.8 (L) 8.0 - 23.0 mg/dL    Creatinine 0.67 0.51 - 0.95 mg/dL    GFR Estimate >90 >60 mL/min/1.73m2    Calcium 9.8 8.8 - 10.4 mg/dL    Chloride 102 98 - 107 mmol/L    Glucose 99 70 - 99 mg/dL    Alkaline Phosphatase 106 40 - 150 U/L    AST 15 0 - 45 U/L    ALT 12 0 - 50 U/L    Protein Total 7.5 6.4 - 8.3 g/dL    Albumin 4.3 3.5 - 5.2 g/dL    Bilirubin Total 0.5 <=1.2 mg/dL   Result Value Ref Range    Magnesium 1.8 1.7 - 2.3 mg/dL   Result Value Ref Range    CRP Inflammation 17.50 (H) <5.00 mg/L   Result Value Ref Range    Lipase 47 13 - 60 U/L   CBC with platelets and differential   Result Value Ref Range    WBC Count 8.9 4.0 - 11.0 10e3/uL    RBC Count 5.11 3.80 - 5.20 10e6/uL    Hemoglobin 14.0 11.7 - 15.7 g/dL    Hematocrit 43.2 35.0 - 47.0 %    MCV 85 78 - 100 fL     MCH 27.4 26.5 - 33.0 pg    MCHC 32.4 31.5 - 36.5 g/dL    RDW 14.2 10.0 - 15.0 %    Platelet Count 222 150 - 450 10e3/uL    % Neutrophils 61 %    % Lymphocytes 27 %    % Monocytes 11 %    % Eosinophils 1 %    % Basophils 0 %    % Immature Granulocytes 0 %    NRBCs per 100 WBC 0 <1 /100    Absolute Neutrophils 5.4 1.6 - 8.3 10e3/uL    Absolute Lymphocytes 2.4 0.8 - 5.3 10e3/uL    Absolute Monocytes 1.0 0.0 - 1.3 10e3/uL    Absolute Eosinophils 0.1 0.0 - 0.7 10e3/uL    Absolute Basophils 0.0 0.0 - 0.2 10e3/uL    Absolute Immature Granulocytes 0.0 <=0.4 10e3/uL    Absolute NRBCs 0.0 10e3/uL   Extra Red Top Tube   Result Value Ref Range    Hold Specimen JIC    Extra Green Top (Lithium Heparin) Tube   Result Value Ref Range    Hold Specimen JIC    Extra Purple Top Tube   Result Value Ref Range    Hold Specimen JIC        RADIOLOGY:  CT Abdomen Pelvis w Contrast   Final Result   IMPRESSION:    1.  Cholecystectomy and hysterectomy.   2.  Stable small hypodense nodule in the superior spleen.   3.  Small fat-containing midline upper ventral abdominal wall hernia with a tiny fat-containing periumbilical hernia.   4.  No bowel wall thickening.   5.  Mild splenomegaly.                EKG:      I have independently reviewed and interpreted the EKG(s) documented above.    PROCEDURES:        [facility] Documentation:   CMS Diagnoses: None      I, Dk Leiva, am serving as a scribe to document services personally performed by Wali Mitchell MD, based on my observation and the provider's statements to me. I, Wali Mitchell MD attest that Dk Leiva is acting in a scribe capacity, has observed my performance of the services and has documented them in accordance with my direction.    Wali Mitchell M.D.  Emergency Medicine  [facility] EMERGENCY DEPARTMENT  [address] [facility] [id number] [phone]  Dept: [phone]     Wali Mitchell,  MD  05/12/25 8037

## 2025-05-12 NOTE — ED TRIAGE NOTES
Patient has had diarrhea for three weeks and had increased in intensity. Patient is taking iron infusions and Munjaro. Clinic told her to come in.       Triage Assessment (Adult)       Row Name 05/12/25 2607          Triage Assessment    Airway WDL WDL        Respiratory WDL    Respiratory WDL WDL        Skin Circulation/Temperature WDL    Skin Circulation/Temperature WDL WDL        Cardiac WDL    Cardiac WDL WDL        Peripheral/Neurovascular WDL    Peripheral Neurovascular WDL WDL        Cognitive/Neuro/Behavioral WDL    Cognitive/Neuro/Behavioral WDL WDL

## 2025-05-30 ENCOUNTER — TRANSFERRED RECORDS (OUTPATIENT)
Dept: ADMINISTRATIVE | Facility: CLINIC | Age: 70
End: 2025-05-30
Payer: COMMERCIAL

## 2025-06-04 NOTE — PROCEDURES
[facility]   [address] [place] [address], [address], [place] [address]     Patient Name: Concepción De  Gender:  Female  Exam Date: 05/30/2025 Visit Number:  [id number]  Age: 69 Years YOB: 1955  Attending MD: Puma Salgado MD Medical Record#:  [id number]  -----------------------------------------------------------------------------------------------------------------------------   Procedure:    Upper GI Endoscopy   Indications:    Abdominal Pain  Provider:        Puma Salgado MD   Referring MD: Keya Dailey MD   Primary MD:      Keya Dailey MD  Medications:   Admitting Medication:   0.9% Normal Saline at [facility]   Intra Procedure Medications:   Patient received monitored anesthesia care.     Complications: No immediate complications  ___________________________________________________________________________________________  Procedure:   An examination of the heart and lungs was performed within acceptable limits.  . The patient was therefore deemed a reasonable candidate for sedation.   The risks and benefits were explained to the patient, who appeared to understand. After obtaining informed consent, the scope was passed under direct vision. Throughout the procedure the patient's blood pressure, pulse and oxygen saturations were monitored.  The scope was introduced through the mouth and advanced to the second portion of duodenum.         Findings:   Esophagus:    Normal esophagus.   The z-line is 40 centimeters from the incisors.  Top of the gastric folds is 40 centimeters from the incisors.  Stomach:  The diaphragm hiatus is at 40 centimeters from the incisors.    Stomach Polyp(s). Location: body. Quantity: several. Size: 5-7 mm. Shape: sessile. Maneuver: biopsy. Instrument used: cold biopsy forceps. Removal: no removal.   *Stomach Comments:  2 lumens from the body leading to the antrum/pylorus  Duodenum:    Normal duodenum.  Impression:   Multiple gastric  polyps    Preliminary Plan:  Return to primary care provider as necessary.  Recommendation Comments:  Normal gastric and esophageal biopsies in 2023.  Pathology Results:  A: STOMACH, BODY, POLYPS, BIOPSY:           1. Fragments of fundic gland polyp           2. Negative for dysplasia and malignancy           3. Negative for inflammation and Helicobacter organisms      MICROSCOPIC  A: Performed     Electronically signed by: Aguila Santos MD    Interpreted at [facility], [address] [place] [id number]      _Electronically signed by:___________________  Robbie Benavidez MD                 05/30/2025    cc: Keya Dailey MD  cc: Keya Dailey MD